# Patient Record
Sex: FEMALE | Race: WHITE | NOT HISPANIC OR LATINO | Employment: FULL TIME | URBAN - NONMETROPOLITAN AREA
[De-identification: names, ages, dates, MRNs, and addresses within clinical notes are randomized per-mention and may not be internally consistent; named-entity substitution may affect disease eponyms.]

---

## 2018-06-20 ENCOUNTER — TELEPHONE (OUTPATIENT)
Dept: SURGERY | Facility: CLINIC | Age: 48
End: 2018-06-20

## 2018-06-25 ENCOUNTER — PREP FOR SURGERY (OUTPATIENT)
Dept: OTHER | Facility: HOSPITAL | Age: 48
End: 2018-06-25

## 2018-06-25 DIAGNOSIS — Z12.11 COLON CANCER SCREENING: Primary | ICD-10-CM

## 2018-06-27 ENCOUNTER — OFFICE VISIT (OUTPATIENT)
Dept: OBSTETRICS AND GYNECOLOGY | Facility: CLINIC | Age: 48
End: 2018-06-27

## 2018-06-27 VITALS
SYSTOLIC BLOOD PRESSURE: 116 MMHG | BODY MASS INDEX: 26.87 KG/M2 | DIASTOLIC BLOOD PRESSURE: 70 MMHG | WEIGHT: 146 LBS | HEIGHT: 62 IN

## 2018-06-27 DIAGNOSIS — N92.6 IRREGULAR MENSES: Primary | ICD-10-CM

## 2018-06-27 PROBLEM — Z12.11 COLON CANCER SCREENING: Status: ACTIVE | Noted: 2018-06-27

## 2018-06-27 PROCEDURE — 99204 OFFICE O/P NEW MOD 45 MIN: CPT | Performed by: OBSTETRICS & GYNECOLOGY

## 2018-06-27 RX ORDER — HYDROXYZINE PAMOATE 25 MG/1
CAPSULE ORAL
Refills: 0 | COMMUNITY
Start: 2018-06-06 | End: 2023-02-13

## 2018-06-27 RX ORDER — CITALOPRAM 10 MG/1
TABLET ORAL
COMMUNITY
End: 2022-08-12

## 2018-06-27 NOTE — PROGRESS NOTES
Subjective   Chief Complaint   Patient presents with   • Menstrual Problem     Светлана Bates is a 48 y.o. year old  ( x 2, C/S x 1).  Patient's last menstrual period was 2018.  She presents to be seen because of Irregular menstruation.  Patient's having 2 periods a month or less.   Going On for maybe 3 or 4 months..  Mother with a change at 48 years of age.  Patient doesn't have any significant vasomotor symptoms.  Last Pap smear was 6 years ago mammogram normal this past month.    OTHER COMPLAINTS:  Nothing else    The following portions of the patient's history were reviewed and updated as appropriate:  She  has a past medical history of Anemia and Anxiety.  She  does not have any pertinent problems on file.  She  has a past surgical history that includes  section (1998).  Her family history is not on file.  She  reports that she has never smoked. She has never used smokeless tobacco. She reports that she does not drink alcohol or use drugs.  Current Outpatient Prescriptions   Medication Sig Dispense Refill   • citalopram (CeleXA) 10 MG tablet citalopram 10 mg tablet   1 and 1/2 tablets daily     • hydrOXYzine (VISTARIL) 25 MG capsule take 1 capsule by mouth four times a day if needed  0     No current facility-administered medications for this visit.      No current outpatient prescriptions on file prior to visit.     No current facility-administered medications on file prior to visit.      She is allergic to albuterol and oxycodone-acetaminophen.    Smoking status: Never Smoker                                                              Smokeless tobacco: Never Used                        Review of Systems  Consitutional POS: nothing reported    NEG: anorexia or night sweats   Gastointestinal POS: nothing reported    NEG: bloating, change in bowel habits, melena or reflux symptoms   Genitourinary POS: nothing reported    NEG: dysuria or hematuria   Integument POS: nothing reported  "   NEG: moles that are changing in size, shape, color or rashes   Breast POS: nothing reported    NEG: persistent breast lump, skin dimpling or nipple discharge         Eyes: negative  Ears, nose, mouth, throat, and face: negative  Respiratory: negative  Cardiovascular: negative  GYN:  as above  Hematologic/lymphatic: negative  Musculoskeletal:negative  Neurological: negative  Behavioral/Psych: negative  Endocrine: negative  Allergic/Immunologic: negative          Objective   /70   Ht 157.5 cm (62\")   Wt 66.2 kg (146 lb)   LMP 06/18/2018   BMI 26.70 kg/m²     General:  well developed; well nourished  no acute distress  obese - Body mass index is 26.7 kg/m².   Skin:  No suspicious lesions seen   Thyroid: normal to inspection and palpation   Lungs:  breathing is unlabored  clear to auscultation bilaterally   Heart:  regular rate and rhythm, S1, S2 normal, no murmur, click, rub or gallop   Breasts:  Not performed.   Abdomen: soft, non-tender; no masses  no umbilical or inginual hernias are present  no hepato-splenomegaly   Pelvis: Clinical staff was present for exam  External genitalia:  normal appearance of the external genitalia including Bartholin's and [place]'s glands.  :  urethral meatus normal;  Vaginal:  normal pink mucosa without prolapse or lesions.  Cervix:  normal appearance.  Uterus:  normal size, shape and consistency.  Adnexa:  normal bimanual exam of the adnexa.  Rectal:  digital rectal exam not performed; anus visually normal appearing.     Psychiatric: Alert and oriented ×3, mood and affect appropriate  HEENT: Atraumatic, normocephalic, normal scleral icterus  Extremities: 2+ pulses bilaterally, no edema      Lab Review   No data reviewed    Imaging   No data reviewed        Assessment   1. Irregular menstruation for the last several months.     Plan   1. Smear done.  FSH and estradiol today.  TVS in 2-3 weeks.  2.     No orders of the defined types were placed in this encounter.     "     This note was electronically signed.      June 27, 2018

## 2018-06-28 LAB
ESTRADIOL SERPL-MCNC: 125.9 PG/ML
FSH SERPL-ACNC: 18.6 MIU/ML

## 2018-07-10 DIAGNOSIS — N92.6 IRREGULAR MENSES: ICD-10-CM

## 2018-07-18 ENCOUNTER — HOSPITAL ENCOUNTER (OUTPATIENT)
Dept: GENERAL RADIOLOGY | Facility: HOSPITAL | Age: 48
Discharge: HOME | End: 2018-07-18
Admitting: OBSTETRICS & GYNECOLOGY

## 2018-07-18 ENCOUNTER — APPOINTMENT (OUTPATIENT)
Dept: PREADMISSION TESTING | Facility: HOSPITAL | Age: 48
End: 2018-07-18

## 2018-07-18 ENCOUNTER — OFFICE VISIT (OUTPATIENT)
Dept: OBSTETRICS AND GYNECOLOGY | Facility: CLINIC | Age: 48
End: 2018-07-18

## 2018-07-18 VITALS
BODY MASS INDEX: 26.87 KG/M2 | SYSTOLIC BLOOD PRESSURE: 108 MMHG | HEIGHT: 62 IN | DIASTOLIC BLOOD PRESSURE: 66 MMHG | WEIGHT: 146 LBS

## 2018-07-18 VITALS
DIASTOLIC BLOOD PRESSURE: 77 MMHG | SYSTOLIC BLOOD PRESSURE: 124 MMHG | HEART RATE: 72 BPM | WEIGHT: 146 LBS | BODY MASS INDEX: 26.87 KG/M2 | OXYGEN SATURATION: 99 % | HEIGHT: 62 IN

## 2018-07-18 DIAGNOSIS — N84.0 ENDOMETRIAL POLYP: ICD-10-CM

## 2018-07-18 DIAGNOSIS — N92.6 IRREGULAR MENSTRUATION: ICD-10-CM

## 2018-07-18 DIAGNOSIS — N84.0 ENDOMETRIAL POLYP: Primary | ICD-10-CM

## 2018-07-18 LAB
ANION GAP SERPL CALCULATED.3IONS-SCNC: 14.9 MMOL/L (ref 10–20)
BACTERIA UR QL AUTO: ABNORMAL /HPF
BASOPHILS # BLD AUTO: 0.02 10*3/MM3 (ref 0–0.2)
BASOPHILS NFR BLD AUTO: 0.3 % (ref 0–2.5)
BILIRUB UR QL STRIP: NEGATIVE
BUN BLD-MCNC: 9 MG/DL (ref 7–20)
BUN/CREAT SERPL: 15 (ref 7.1–23.5)
CALCIUM SPEC-SCNC: 9.6 MG/DL (ref 8.4–10.2)
CHLORIDE SERPL-SCNC: 101 MMOL/L (ref 98–107)
CLARITY UR: CLEAR
CO2 SERPL-SCNC: 28 MMOL/L (ref 26–30)
COLOR UR: YELLOW
CREAT BLD-MCNC: 0.6 MG/DL (ref 0.6–1.3)
DEPRECATED RDW RBC AUTO: 47.6 FL (ref 37–54)
EOSINOPHIL # BLD AUTO: 0.02 10*3/MM3 (ref 0–0.7)
EOSINOPHIL NFR BLD AUTO: 0.3 % (ref 0–7)
ERYTHROCYTE [DISTWIDTH] IN BLOOD BY AUTOMATED COUNT: 15.1 % (ref 11.5–14.5)
GFR SERPL CREATININE-BSD FRML MDRD: 106 ML/MIN/1.73
GLUCOSE BLD-MCNC: 94 MG/DL (ref 74–98)
GLUCOSE UR STRIP-MCNC: NEGATIVE MG/DL
HCT VFR BLD AUTO: 32 % (ref 37–47)
HGB BLD-MCNC: 9.9 G/DL (ref 12–16)
HGB UR QL STRIP.AUTO: ABNORMAL
HYALINE CASTS UR QL AUTO: ABNORMAL /LPF
IMM GRANULOCYTES # BLD: 0.02 10*3/MM3 (ref 0–0.06)
IMM GRANULOCYTES NFR BLD: 0.3 % (ref 0–0.6)
KETONES UR QL STRIP: NEGATIVE
LEUKOCYTE ESTERASE UR QL STRIP.AUTO: NEGATIVE
LYMPHOCYTES # BLD AUTO: 2.07 10*3/MM3 (ref 0.6–3.4)
LYMPHOCYTES NFR BLD AUTO: 36.1 % (ref 10–50)
MCH RBC QN AUTO: 27 PG (ref 27–31)
MCHC RBC AUTO-ENTMCNC: 30.9 G/DL (ref 30–37)
MCV RBC AUTO: 87.4 FL (ref 81–99)
MONOCYTES # BLD AUTO: 0.37 10*3/MM3 (ref 0–0.9)
MONOCYTES NFR BLD AUTO: 6.4 % (ref 0–12)
NEUTROPHILS # BLD AUTO: 3.24 10*3/MM3 (ref 2–6.9)
NEUTROPHILS NFR BLD AUTO: 56.6 % (ref 37–80)
NITRITE UR QL STRIP: NEGATIVE
NRBC BLD MANUAL-RTO: 0 /100 WBC (ref 0–0)
PH UR STRIP.AUTO: 7.5 [PH] (ref 5–8)
PLATELET # BLD AUTO: 340 10*3/MM3 (ref 130–400)
PMV BLD AUTO: 10.8 FL (ref 6–12)
POTASSIUM BLD-SCNC: 3.9 MMOL/L (ref 3.5–5.1)
PROT UR QL STRIP: NEGATIVE
RBC # BLD AUTO: 3.66 10*6/MM3 (ref 4.2–5.4)
RBC # UR: ABNORMAL /HPF
REF LAB TEST METHOD: ABNORMAL
SODIUM BLD-SCNC: 140 MMOL/L (ref 137–145)
SP GR UR STRIP: 1.01 (ref 1–1.03)
SQUAMOUS #/AREA URNS HPF: ABNORMAL /HPF
UROBILINOGEN UR QL STRIP: ABNORMAL
WBC NRBC COR # BLD: 5.74 10*3/MM3 (ref 4.8–10.8)
WBC UR QL AUTO: ABNORMAL /HPF

## 2018-07-18 PROCEDURE — 85025 COMPLETE CBC W/AUTO DIFF WBC: CPT | Performed by: OBSTETRICS & GYNECOLOGY

## 2018-07-18 PROCEDURE — 36415 COLL VENOUS BLD VENIPUNCTURE: CPT

## 2018-07-18 PROCEDURE — 81001 URINALYSIS AUTO W/SCOPE: CPT | Performed by: OBSTETRICS & GYNECOLOGY

## 2018-07-18 PROCEDURE — 99213 OFFICE O/P EST LOW 20 MIN: CPT | Performed by: OBSTETRICS & GYNECOLOGY

## 2018-07-18 PROCEDURE — 93005 ELECTROCARDIOGRAM TRACING: CPT | Performed by: OBSTETRICS & GYNECOLOGY

## 2018-07-18 PROCEDURE — 71045 X-RAY EXAM CHEST 1 VIEW: CPT

## 2018-07-18 PROCEDURE — 80048 BASIC METABOLIC PNL TOTAL CA: CPT | Performed by: OBSTETRICS & GYNECOLOGY

## 2018-07-18 NOTE — PROGRESS NOTES
Patient presents with   • Menstrual Problem      Светлана Bates is a 48 y.o. year old  ( x 2, C/S x 1).  Patient's last menstrual period was 2018.  She presents to be seen because of Irregular menstruation.  Patient's having 2 periods a month or less.   Going On for maybe 3 or 4 months..  Mother with a change at 48 years of age.  Patient doesn't have any significant vasomotor symptoms.  Last Pap smear was 6 years ago mammogram normal this past month.     OTHER COMPLAINTS:  Nothing else     The following portions of the patient's history were reviewed and updated as appropriate:  She  has a past medical history of Anemia and Anxiety.  She  does not have any pertinent problems on file.  She  has a past surgical history that includes  section (1998).  Her family history is not on file.  She  reports that she has never smoked. She has never used smokeless tobacco. She reports that she does not drink alcohol or use drugs.         Current Outpatient Prescriptions   Medication Sig Dispense Refill   • citalopram (CeleXA) 10 MG tablet citalopram 10 mg tablet   1 and 1/2 tablets daily       • hydrOXYzine (VISTARIL) 25 MG capsule take 1 capsule by mouth four times a day if needed   0      No current facility-administered medications for this visit.       No current outpatient prescriptions on file prior to visit.      No current facility-administered medications on file prior to visit.       She is allergic to albuterol and oxycodone-acetaminophen.     Smoking status: Never Smoker                                                               Smokeless tobacco: Never Used                         Review of Systems        Consitutional POS: nothing reported     NEG: anorexia or night sweats   Gastointestinal POS: nothing reported     NEG: bloating, change in bowel habits, melena or reflux symptoms   Genitourinary POS: nothing reported     NEG: dysuria or hematuria   Integument POS: nothing reported  "    NEG: moles that are changing in size, shape, color or rashes   Breast POS: nothing reported     NEG: persistent breast lump, skin dimpling or nipple discharge             Eyes: negative  Ears, nose, mouth, throat, and face: negative  Respiratory: negative  Cardiovascular: negative  GYN:  as above  Hematologic/lymphatic: negative  Musculoskeletal:negative  Neurological: negative  Behavioral/Psych: negative  Endocrine: negative  Allergic/Immunologic: negative              Objective      /70   Ht 157.5 cm (62\")   Wt 66.2 kg (146 lb)   LMP 06/18/2018   BMI 26.70 kg/m²      General:  well developed; well nourished  no acute distress  obese - Body mass index is 26.7 kg/m².   Skin:  No suspicious lesions seen   Thyroid: normal to inspection and palpation   Lungs:  breathing is unlabored  clear to auscultation bilaterally   Heart:  regular rate and rhythm, S1, S2 normal, no murmur, click, rub or gallop   Breasts:  Not performed.   Abdomen: soft, non-tender; no masses  no umbilical or inginual hernias are present  no hepato-splenomegaly   Pelvis: Clinical staff was present for exam  External genitalia:  normal appearance of the external genitalia including Bartholin's and [place]'s glands.  :  urethral meatus normal;  Vaginal:  normal pink mucosa without prolapse or lesions.  Cervix:  normal appearance.  Uterus:  normal size, shape and consistency.  Adnexa:  normal bimanual exam of the adnexa.  Rectal:  digital rectal exam not performed; anus visually normal appearing.      Psychiatric: Alert and oriented ×3, mood and affect appropriate  HEENT: Atraumatic, normocephalic, normal scleral icterus  Extremities: 2+ pulses bilaterally, no edema        Lab Review   FSH 18, pap WNL     Imaging   Images reviwed: thicekend endometrium , uterus  10.45 x 7x 6, normal adnexa, simple left ovarian cyst.            Assessment      1. Irregular menstruation for the last several months. Suspected Endometrial polyp        Plan    "   1. Hysteroscopy, D&C  2. R B/A

## 2018-07-18 NOTE — PAT
"DURING PAT HEALTH HISTORY PATIENT REPORTED THAT SHE HAD CHEST PAIN IN FEB 2018 AND THAT WHEN SEEN BY MD, SHE WAS DIAGNOSED WITH RIGHT SIDE PNEUMONIA AND PLEURICY.  REPORTS SHE WAS TREATED .    REPORTS ON July 4 2018 THAT SHE HAD CHEST PAIN AGAIN \"THAT LASTED ABOUT HALF A DAY\" AND EXPERIENCED DISCOMFORT ON THE LEFT SIDE.  PATIENT REPORTED THAT THE PAIN RESOLVED BUT THAT SHE DID NOT SEEK MEDICAL TREATMENT BECAUSE IT \"WAS LIKE WHAT SHE HAD IN FEB AND THAT SHE DID NOT WANT TO INCUR MEDICAL BILLS\".      EKG OBTAINED.  PRELIMINARY READING PHONED TO GURWINDER CHAPIN CRNA. DISCUSSED PATIENT'S HISTORY OF CHEST PAIN, WITH RECENT REPORT 07-04-18.  DISCUSSED WITH CRNA PATIENT'S MEDICATION LIST, HX OF ANXIETY AND THAT PATIENT WAS NOT UNDER THE CARE OF CARDIOLOGY FOR ANY REASON.    [facility] REQUESTED THAT PATIENT HAVE A CXR IN ADDITION TO THE TESTING ALREADY ORDERED.  [facility] REPORTED THAT AS LONG AS THE CXR WAS NORMAL, THAT PATIENT MAY PROCEED WITH PROCEDURES AS SCHEDULED FOR BOTH 07-27-18 (COLONOSCOPY WITH DR PALMER) AND 08-10-18 (D&C HYSTEROSCOPY WITH DR PAREDES).    PATIENT DISMISSED FROM [facility] AND FREE OF DISTRESS. PATIENT INSTRUCTED TO SEEK MEDICAL ATTENTION IMMEDIATELY IN THE FUTURE IF SHE EXPERIENCED CHEST PAIN.  PATIENT VERBALIZED UNDERSTANDING.    "

## 2018-07-19 RX ORDER — NITROFURANTOIN 25; 75 MG/1; MG/1
100 CAPSULE ORAL 2 TIMES DAILY
Qty: 14 CAPSULE | Refills: 0 | Status: SHIPPED | OUTPATIENT
Start: 2018-07-19 | End: 2018-07-27 | Stop reason: HOSPADM

## 2018-07-26 ENCOUNTER — TELEPHONE (OUTPATIENT)
Dept: SURGERY | Facility: CLINIC | Age: 48
End: 2018-07-26

## 2018-07-27 ENCOUNTER — ANESTHESIA EVENT (OUTPATIENT)
Dept: GASTROENTEROLOGY | Facility: HOSPITAL | Age: 48
End: 2018-07-27

## 2018-07-27 ENCOUNTER — HOSPITAL ENCOUNTER (OUTPATIENT)
Facility: HOSPITAL | Age: 48
Setting detail: HOSPITAL OUTPATIENT SURGERY
Discharge: HOME | End: 2018-07-27
Attending: SURGERY | Admitting: SURGERY

## 2018-07-27 ENCOUNTER — ANESTHESIA (OUTPATIENT)
Dept: GASTROENTEROLOGY | Facility: HOSPITAL | Age: 48
End: 2018-07-27

## 2018-07-27 VITALS
RESPIRATION RATE: 18 BRPM | TEMPERATURE: 98.1 F | HEIGHT: 62 IN | OXYGEN SATURATION: 100 % | SYSTOLIC BLOOD PRESSURE: 101 MMHG | WEIGHT: 146 LBS | HEART RATE: 69 BPM | BODY MASS INDEX: 26.87 KG/M2 | DIASTOLIC BLOOD PRESSURE: 61 MMHG

## 2018-07-27 LAB — HCG SERPL QL: NEGATIVE

## 2018-07-27 PROCEDURE — S0260 H&P FOR SURGERY: HCPCS | Performed by: SURGERY

## 2018-07-27 PROCEDURE — 84703 CHORIONIC GONADOTROPIN ASSAY: CPT | Performed by: NURSE ANESTHETIST, CERTIFIED REGISTERED

## 2018-07-27 PROCEDURE — 25010000002 PROPOFOL 200 MG/20ML EMULSION: Performed by: NURSE ANESTHETIST, CERTIFIED REGISTERED

## 2018-07-27 RX ORDER — MAGNESIUM HYDROXIDE 1200 MG/15ML
LIQUID ORAL AS NEEDED
Status: DISCONTINUED | OUTPATIENT
Start: 2018-07-27 | End: 2018-07-27 | Stop reason: HOSPADM

## 2018-07-27 RX ORDER — ONDANSETRON 2 MG/ML
4 INJECTION INTRAMUSCULAR; INTRAVENOUS ONCE AS NEEDED
Status: DISCONTINUED | OUTPATIENT
Start: 2018-07-27 | End: 2018-07-27 | Stop reason: HOSPADM

## 2018-07-27 RX ORDER — SODIUM CHLORIDE 0.9 % (FLUSH) 0.9 %
3 SYRINGE (ML) INJECTION AS NEEDED
Status: DISCONTINUED | OUTPATIENT
Start: 2018-07-27 | End: 2018-07-27 | Stop reason: HOSPADM

## 2018-07-27 RX ORDER — PROPOFOL 10 MG/ML
INJECTION, EMULSION INTRAVENOUS AS NEEDED
Status: DISCONTINUED | OUTPATIENT
Start: 2018-07-27 | End: 2018-07-27 | Stop reason: SURG

## 2018-07-27 RX ORDER — SODIUM CHLORIDE, SODIUM LACTATE, POTASSIUM CHLORIDE, CALCIUM CHLORIDE 600; 310; 30; 20 MG/100ML; MG/100ML; MG/100ML; MG/100ML
1000 INJECTION, SOLUTION INTRAVENOUS CONTINUOUS
Status: DISCONTINUED | OUTPATIENT
Start: 2018-07-27 | End: 2018-07-27 | Stop reason: HOSPADM

## 2018-07-27 RX ADMIN — SODIUM CHLORIDE, POTASSIUM CHLORIDE, SODIUM LACTATE AND CALCIUM CHLORIDE 1000 ML: 600; 310; 30; 20 INJECTION, SOLUTION INTRAVENOUS at 08:34

## 2018-07-27 RX ADMIN — PROPOFOL 100 MG: 10 INJECTION, EMULSION INTRAVENOUS at 10:42

## 2018-07-27 RX ADMIN — PROPOFOL 50 MG: 10 INJECTION, EMULSION INTRAVENOUS at 10:49

## 2018-07-27 NOTE — ANESTHESIA POSTPROCEDURE EVALUATION
Patient: Светлана Bates    Procedure Summary     Date:  07/27/18 Room / Location:  [facility] ENDOSCOPY 3 / [facility] ENDOSCOPY    Anesthesia Start:  1038 Anesthesia Stop:  1054    Procedure:  COLONOSCOPY (N/A ) Diagnosis:       Colon cancer screening      (Colon cancer screening [Z12.11])    Surgeon:  Basil Rodarte MD Provider:  Mukesh Morgan CRNA    Anesthesia Type:  MAC ASA Status:  2          Anesthesia Type: MAC  Last vitals  BP   101/61 (07/27/18 1114)   Temp   98.1 °F (36.7 °C) (07/27/18 1058)   Pulse   69 (07/27/18 1114)   Resp   18 (07/27/18 1114)     SpO2   100 % (07/27/18 1114)     Post Anesthesia Care and Evaluation    Patient location during evaluation: PHASE II  Patient participation: complete - patient participated  Level of consciousness: awake and alert  Pain score: 0  Pain management: satisfactory to patient  Airway patency: patent  Anesthetic complications: No anesthetic complications  PONV Status: none  Cardiovascular status: acceptable and hemodynamically stable  Respiratory status: acceptable  Hydration status: acceptable

## 2018-07-27 NOTE — ANESTHESIA PREPROCEDURE EVALUATION
Anesthesia Evaluation     Patient summary reviewed and Nursing notes reviewed   no history of anesthetic complications:  NPO Solid Status: > 8 hours  NPO Liquid Status: > 8 hours           Airway   Mallampati: I  TM distance: >3 FB  Neck ROM: full  no difficulty expected  Dental - normal exam   (+) edentulous    Pulmonary - negative pulmonary ROS and normal exam   Cardiovascular - normal exam    (+) valvular problems/murmurs murmur,       Neuro/Psych- negative ROS  GI/Hepatic/Renal/Endo - negative ROS     Musculoskeletal (-) negative ROS    Abdominal    Substance History - negative use     OB/GYN negative ob/gyn ROS         Other - negative ROS                     Anesthesia Plan    ASA 2     MAC     intravenous induction   Anesthetic plan and risks discussed with patient.

## 2018-08-10 ENCOUNTER — ANESTHESIA EVENT (OUTPATIENT)
Dept: PERIOP | Facility: HOSPITAL | Age: 48
End: 2018-08-10

## 2018-08-10 ENCOUNTER — HOSPITAL ENCOUNTER (OUTPATIENT)
Facility: HOSPITAL | Age: 48
Setting detail: HOSPITAL OUTPATIENT SURGERY
Discharge: HOME | End: 2018-08-10
Attending: OBSTETRICS & GYNECOLOGY | Admitting: OBSTETRICS & GYNECOLOGY

## 2018-08-10 ENCOUNTER — ANESTHESIA (OUTPATIENT)
Dept: PERIOP | Facility: HOSPITAL | Age: 48
End: 2018-08-10

## 2018-08-10 VITALS
RESPIRATION RATE: 16 BRPM | DIASTOLIC BLOOD PRESSURE: 56 MMHG | OXYGEN SATURATION: 100 % | HEART RATE: 82 BPM | TEMPERATURE: 98.3 F | SYSTOLIC BLOOD PRESSURE: 107 MMHG

## 2018-08-10 DIAGNOSIS — N92.6 IRREGULAR MENSTRUATION: ICD-10-CM

## 2018-08-10 DIAGNOSIS — N84.0 ENDOMETRIAL POLYP: ICD-10-CM

## 2018-08-10 LAB
B-HCG UR QL: NEGATIVE
INTERNAL NEGATIVE CONTROL: NEGATIVE
INTERNAL POSITIVE CONTROL: POSITIVE
Lab: NORMAL

## 2018-08-10 PROCEDURE — 25010000002 KETOROLAC TROMETHAMINE PER 15 MG: Performed by: NURSE ANESTHETIST, CERTIFIED REGISTERED

## 2018-08-10 PROCEDURE — 81025 URINE PREGNANCY TEST: CPT | Performed by: OBSTETRICS & GYNECOLOGY

## 2018-08-10 PROCEDURE — 25010000002 PROPOFOL 10 MG/ML EMULSION: Performed by: NURSE ANESTHETIST, CERTIFIED REGISTERED

## 2018-08-10 PROCEDURE — 25010000002 ONDANSETRON PER 1 MG: Performed by: NURSE ANESTHETIST, CERTIFIED REGISTERED

## 2018-08-10 PROCEDURE — 25010000002 DEXAMETHASONE PER 1 MG: Performed by: NURSE ANESTHETIST, CERTIFIED REGISTERED

## 2018-08-10 PROCEDURE — 58558 HYSTEROSCOPY BIOPSY: CPT | Performed by: OBSTETRICS & GYNECOLOGY

## 2018-08-10 RX ORDER — MEPERIDINE HYDROCHLORIDE 50 MG/ML
INJECTION INTRAMUSCULAR; INTRAVENOUS; SUBCUTANEOUS AS NEEDED
Status: DISCONTINUED | OUTPATIENT
Start: 2018-08-10 | End: 2018-08-10 | Stop reason: SURG

## 2018-08-10 RX ORDER — KETOROLAC TROMETHAMINE 30 MG/ML
INJECTION, SOLUTION INTRAMUSCULAR; INTRAVENOUS AS NEEDED
Status: DISCONTINUED | OUTPATIENT
Start: 2018-08-10 | End: 2018-08-10 | Stop reason: SURG

## 2018-08-10 RX ORDER — SODIUM CHLORIDE, SODIUM LACTATE, POTASSIUM CHLORIDE, CALCIUM CHLORIDE 600; 310; 30; 20 MG/100ML; MG/100ML; MG/100ML; MG/100ML
1000 INJECTION, SOLUTION INTRAVENOUS CONTINUOUS
Status: DISCONTINUED | OUTPATIENT
Start: 2018-08-10 | End: 2018-08-10 | Stop reason: HOSPADM

## 2018-08-10 RX ORDER — PROPOFOL 10 MG/ML
VIAL (ML) INTRAVENOUS AS NEEDED
Status: DISCONTINUED | OUTPATIENT
Start: 2018-08-10 | End: 2018-08-10 | Stop reason: SURG

## 2018-08-10 RX ORDER — IBUPROFEN 600 MG/1
600 TABLET ORAL EVERY 6 HOURS PRN
Status: CANCELLED | OUTPATIENT
Start: 2018-08-10

## 2018-08-10 RX ORDER — SODIUM CHLORIDE 0.9 % (FLUSH) 0.9 %
3 SYRINGE (ML) INJECTION AS NEEDED
Status: DISCONTINUED | OUTPATIENT
Start: 2018-08-10 | End: 2018-08-10 | Stop reason: HOSPADM

## 2018-08-10 RX ORDER — DEXAMETHASONE SODIUM PHOSPHATE 4 MG/ML
INJECTION, SOLUTION INTRA-ARTICULAR; INTRALESIONAL; INTRAMUSCULAR; INTRAVENOUS; SOFT TISSUE AS NEEDED
Status: DISCONTINUED | OUTPATIENT
Start: 2018-08-10 | End: 2018-08-10 | Stop reason: SURG

## 2018-08-10 RX ORDER — IBUPROFEN 600 MG/1
600 TABLET ORAL EVERY 6 HOURS PRN
Qty: 30 TABLET | Refills: 1 | Status: SHIPPED | OUTPATIENT
Start: 2018-08-10 | End: 2021-05-05

## 2018-08-10 RX ORDER — ONDANSETRON 2 MG/ML
INJECTION INTRAMUSCULAR; INTRAVENOUS AS NEEDED
Status: DISCONTINUED | OUTPATIENT
Start: 2018-08-10 | End: 2018-08-10 | Stop reason: SURG

## 2018-08-10 RX ORDER — LIDOCAINE HYDROCHLORIDE 20 MG/ML
INJECTION, SOLUTION INFILTRATION; PERINEURAL AS NEEDED
Status: DISCONTINUED | OUTPATIENT
Start: 2018-08-10 | End: 2018-08-10 | Stop reason: HOSPADM

## 2018-08-10 RX ORDER — ONDANSETRON 4 MG/1
4 TABLET, FILM COATED ORAL ONCE AS NEEDED
Status: DISCONTINUED | OUTPATIENT
Start: 2018-08-10 | End: 2018-08-10 | Stop reason: HOSPADM

## 2018-08-10 RX ADMIN — SODIUM CHLORIDE, POTASSIUM CHLORIDE, SODIUM LACTATE AND CALCIUM CHLORIDE: 600; 310; 30; 20 INJECTION, SOLUTION INTRAVENOUS at 09:56

## 2018-08-10 RX ADMIN — PROPOFOL 50 MG: 10 INJECTION, EMULSION INTRAVENOUS at 09:25

## 2018-08-10 RX ADMIN — MEPERIDINE HYDROCHLORIDE 25 MG: 50 INJECTION, SOLUTION INTRAMUSCULAR; INTRAVENOUS; SUBCUTANEOUS at 09:24

## 2018-08-10 RX ADMIN — PROPOFOL 50 MG: 10 INJECTION, EMULSION INTRAVENOUS at 09:51

## 2018-08-10 RX ADMIN — PROPOFOL 50 MG: 10 INJECTION, EMULSION INTRAVENOUS at 09:46

## 2018-08-10 RX ADMIN — SODIUM CHLORIDE, POTASSIUM CHLORIDE, SODIUM LACTATE AND CALCIUM CHLORIDE 1000 ML: 600; 310; 30; 20 INJECTION, SOLUTION INTRAVENOUS at 08:22

## 2018-08-10 RX ADMIN — PROPOFOL 50 MG: 10 INJECTION, EMULSION INTRAVENOUS at 09:30

## 2018-08-10 RX ADMIN — PROPOFOL 50 MG: 10 INJECTION, EMULSION INTRAVENOUS at 09:41

## 2018-08-10 RX ADMIN — MEPERIDINE HYDROCHLORIDE 25 MG: 50 INJECTION, SOLUTION INTRAMUSCULAR; INTRAVENOUS; SUBCUTANEOUS at 09:28

## 2018-08-10 RX ADMIN — ONDANSETRON 4 MG: 2 INJECTION INTRAMUSCULAR; INTRAVENOUS at 09:28

## 2018-08-10 RX ADMIN — DEXAMETHASONE SODIUM PHOSPHATE 4 MG: 4 INJECTION, SOLUTION INTRAMUSCULAR; INTRAVENOUS at 09:28

## 2018-08-10 RX ADMIN — LIDOCAINE HYDROCHLORIDE 60 MG: 20 INJECTION, SOLUTION INTRAVENOUS at 09:25

## 2018-08-10 RX ADMIN — KETOROLAC TROMETHAMINE 30 MG: 30 INJECTION, SOLUTION INTRAMUSCULAR at 09:28

## 2018-08-10 RX ADMIN — PROPOFOL 50 MG: 10 INJECTION, EMULSION INTRAVENOUS at 09:36

## 2018-08-10 NOTE — H&P (VIEW-ONLY)
Patient presents with   • Menstrual Problem      Светлана Bates is a 48 y.o. year old  ( x 2, C/S x 1).  Patient's last menstrual period was 2018.  She presents to be seen because of Irregular menstruation.  Patient's having 2 periods a month or less.   Going On for maybe 3 or 4 months..  Mother with a change at 50 years of age.  Patient doesn't have any significant vasomotor symptoms.  Last Pap smear was 6 years ago mammogram normal this past month.     OTHER COMPLAINTS:  Nothing else     The following portions of the patient's history were reviewed and updated as appropriate:  She  has a past medical history of Anemia and Anxiety.  She  does not have any pertinent problems on file.  She  has a past surgical history that includes  section (1998).  Her family history is not on file.  She  reports that she has never smoked. She has never used smokeless tobacco. She reports that she does not drink alcohol or use drugs.         Current Outpatient Prescriptions   Medication Sig Dispense Refill   • citalopram (CeleXA) 10 MG tablet citalopram 10 mg tablet   1 and 1/2 tablets daily       • hydrOXYzine (VISTARIL) 25 MG capsule take 1 capsule by mouth four times a day if needed   0      No current facility-administered medications for this visit.       No current outpatient prescriptions on file prior to visit.      No current facility-administered medications on file prior to visit.       She is allergic to albuterol and oxycodone-acetaminophen.     Smoking status: Never Smoker                                                               Smokeless tobacco: Never Used                         Review of Systems        Consitutional POS: nothing reported     NEG: anorexia or night sweats   Gastointestinal POS: nothing reported     NEG: bloating, change in bowel habits, melena or reflux symptoms   Genitourinary POS: nothing reported     NEG: dysuria or hematuria   Integument POS: nothing reported    "    NEG: moles that are changing in size, shape, color or rashes   Breast POS: nothing reported     NEG: persistent breast lump, skin dimpling or nipple discharge             Eyes: negative  Ears, nose, mouth, throat, and face: negative  Respiratory: negative  Cardiovascular: negative  GYN:  as above  Hematologic/lymphatic: negative  Musculoskeletal:negative  Neurological: negative  Behavioral/Psych: negative  Endocrine: negative  Allergic/Immunologic: negative              Objective      /70   Ht 157.5 cm (62\")   Wt 66.2 kg (146 lb)   LMP 06/18/2018   BMI 26.70 kg/m²      General:  well developed; well nourished  no acute distress  obese - Body mass index is 26.7 kg/m².   Skin:  No suspicious lesions seen   Thyroid: normal to inspection and palpation   Lungs:  breathing is unlabored  clear to auscultation bilaterally   Heart:  regular rate and rhythm, S1, S2 normal, no murmur, click, rub or gallop   Breasts:  Not performed.   Abdomen: soft, non-tender; no masses  no umbilical or inginual hernias are present  no hepato-splenomegaly   Pelvis: Clinical staff was present for exam  External genitalia:  normal appearance of the external genitalia including Bartholin's and [place]'s glands.  :  urethral meatus normal;  Vaginal:  normal pink mucosa without prolapse or lesions.  Cervix:  normal appearance.  Uterus:  normal size, shape and consistency.  Adnexa:  normal bimanual exam of the adnexa.  Rectal:  digital rectal exam not performed; anus visually normal appearing.      Psychiatric: Alert and oriented ×3, mood and affect appropriate  HEENT: Atraumatic, normocephalic, normal scleral icterus  Extremities: 2+ pulses bilaterally, no edema        Lab Review   FSH 18, pap WNL     Imaging   Images reviwed: thicekend endometrium , uterus  10.45 x 7x 6, normal adnexa, simple left ovarian cyst.            Assessment      1. Irregular menstruation for the last several months. Suspected Endometrial polyp        Plan    "   1. Hysteroscopy, D&C  2. R B/A

## 2018-08-10 NOTE — DISCHARGE INSTRUCTIONS
To assist you in voiding:  Drink plenty of fluids  Listen to running water while attempting to void.    If you are unable to urinate and you have an uncomfortable urge to void or it has been   6 hours since you were discharged, return to the Emergency Room    No pushing, pulling, tugging, heavy lifting, or strenuous activity.  No major decision making, driving, or drinking alcoholic beverages for 24 hours. (due to the medications you have received)  Always use good hand hygiene/washing techniques.  NO driving while taking pain medications.

## 2018-08-10 NOTE — OP NOTE
Rishi Bates  : 1970  MRN: [id number]  CSN: [id number]  Date: 8/10/2018    Operative Report    DILATATION AND CURETTAGE HYSTEROSCOPY      Pre-op Diagnosis:  Endometrial polyp [N84.0]  Irregular menstruation [N92.6]   Post-op Diagnosis:  Post-Op Diagnosis Codes:     * Endometrial polyp [N84.0]     * Irregular menstruation [N92.6]   Procedure: Procedure(s):  DILATATION AND CURETTAGE HYSTEROSCOPY   Surgeon: CECIL Harden M.D.   Assist: Mode Loredo MD   Anesthesia: Monitor Anesthesia Care   Estimated Blood Loss: <15 mls   ABx: none   Specimens:  Endometrial curettings and polyps   Findings: Endometrial polyps   Complications: none   Indications:  48-year-old with intermenstrual spotting and a thickened endometrial lining suggestive of polyps presents for D&C hysteroscopy.  Risk benefits alternatives were discussed all questions were answered.       Description of Procedure:  After the appropriate time out and adequate dosing of her anesthesia, the patient had been prepped and draped in the usual sterile fashion.  She was placed in the dorsal lithotomy position using Jefry stirrups.  The bladder had been drained with a red rubber catheter per nursing.  A weighted speculum was placed in the vagina.  The anterior lip of the cervix was grasped with a single-tooth tenaculum.  The cervix was injected at the 3 o'clock and 9 o'clock position with 1% lidocaine plain without any complications.  The cervix was then progressively dilated using Dumont dilators.  Rigid hysteroscopy was then performed with the above findings noted.  Sharp curettings were then obtained with a good cry throughout with tissue retrieved and sent for pathologic specimen.  The uterus was sounded to 8 cms.  The intrauterine contraceptive device was gently inserted per the 's instructions.    The cervical tenaculum was removed and the cervix was noted to be hemostatic.  All instrument and sponge counts were correct at the  end of the procedure.  The patient tolerated the procedure well.  There were no complications.  She was taken to the postoperative recovery room in stable condition.    CECIL Harden M.D.  8/10/2018  9:57 AM

## 2018-08-10 NOTE — ANESTHESIA PREPROCEDURE EVALUATION
Anesthesia Evaluation     Patient summary reviewed and Nursing notes reviewed   no history of anesthetic complications:  NPO Solid Status: > 8 hours  NPO Liquid Status: > 8 hours           Airway   Mallampati: II  TM distance: >3 FB  Neck ROM: full  no difficulty expected  Dental - normal exam     Pulmonary - negative pulmonary ROS and normal exam   Cardiovascular - normal exam    Rhythm: regular  Rate: normal    (+) valvular problems/murmurs,       Neuro/Psych  (+) headaches, psychiatric history Anxiety and Depression,     GI/Hepatic/Renal/Endo - negative ROS     Musculoskeletal (-) negative ROS    Abdominal    Substance History - negative use     OB/GYN negative ob/gyn ROS         Other - negative ROS                       Anesthesia Plan    ASA 2     MAC   (Pt told that intravenous sedation will be used as the primary anesthetic along with local anesthesia if necessary. Every effort will be made to make sure the patient is comfortable.     The patient was told they may or may not have recall for the procedure. It was further explained that if the MAC was not adequate that a general anesthetic with either an LMA or endotracheal tube would be required.     Will proceed with the plan of care.)  intravenous induction   Anesthetic plan and risks discussed with patient.

## 2018-08-10 NOTE — ANESTHESIA POSTPROCEDURE EVALUATION
Patient: Светлана Bates    Procedure Summary     Date:  08/10/18 Room / Location:  [facility] OR 2 /  [facility]    Anesthesia Start:  0921 Anesthesia Stop:  1008    Procedure:  DILATATION AND CURETTAGE HYSTEROSCOPY (N/A Uterus) Diagnosis:       Endometrial polyp      Irregular menstruation      (Endometrial polyp [N84.0])      (Irregular menstruation [N92.6])    Surgeon:  James Harden MD Provider:  Velia Foy CRNA    Anesthesia Type:  MAC ASA Status:  2          Anesthesia Type: MAC  Last vitals  BP   98/50 (08/10/18 1007)   Temp   98.3 °F (36.8 °C) (08/10/18 1007)   Pulse   80 (08/10/18 1007)   Resp   12 (08/10/18 1007)     SpO2   97 % (08/10/18 1007)     Post Anesthesia Care and Evaluation    Patient location during evaluation: PHASE II  Patient participation: complete - patient participated  Level of consciousness: awake and alert  Pain score: 0  Pain management: satisfactory to patient  Airway patency: patent  Anesthetic complications: No anesthetic complications  PONV Status: none  Cardiovascular status: acceptable and stable  Respiratory status: acceptable  Hydration status: acceptable

## 2018-08-15 LAB
LAB AP CASE REPORT: NORMAL
PATH REPORT.FINAL DX SPEC: NORMAL

## 2018-08-22 ENCOUNTER — OFFICE VISIT (OUTPATIENT)
Dept: OBSTETRICS AND GYNECOLOGY | Facility: CLINIC | Age: 48
End: 2018-08-22

## 2018-08-22 VITALS
DIASTOLIC BLOOD PRESSURE: 70 MMHG | BODY MASS INDEX: 27.16 KG/M2 | SYSTOLIC BLOOD PRESSURE: 120 MMHG | WEIGHT: 147.6 LBS | HEIGHT: 62 IN

## 2018-08-22 DIAGNOSIS — Z09 POSTOP CHECK: Primary | ICD-10-CM

## 2018-08-22 DIAGNOSIS — Z98.890 STATUS POST D&C: ICD-10-CM

## 2018-08-22 PROCEDURE — 99212 OFFICE O/P EST SF 10 MIN: CPT | Performed by: PHYSICIAN ASSISTANT

## 2018-08-22 NOTE — PROGRESS NOTES
"Subjective   Chief Complaint   Patient presents with   • Post-op     12 days post-op D&C Hysteroscopy; doing well       Светлана Bates is a 48 y.o. year old  presenting to be seen for postop visit  She is 12 days post op D&C hysteroscopy  She has done well post op with no bleeding since surgery. Has had normal bowel and bladder function  Pathology benign-polyp    Past Medical History:   Diagnosis Date   • Anemia    • Anxiety    • Body piercing     EARS   • Chest pain     REPORTS 2018 AND WAS DIAGNOSED WITH RIGHT SIDED PNEUMONIA AND PLEURICY. REPORTS EPISODE OF CHEST PAIN AGAIN IN 2018 AND DID NOT HAVE MEDICAL EVALUATION FOR THIS.    • Full dentures     INSTRUCTED NO ADHESIVES THE DOS   • Headache    • History of migraine    • History of pneumonia 2018   • Irregular heart beat     REPORTS \"SOMETIMES I FEEL IT MISS A BEAT BUT THAT MAY BE FROM MY ANXIETY\"   • Murmur     REPORTS \"WHEN I WAS BORN\"   • Restless leg     REPORTS HAS NEVER NEEDED MEDICATION FOR THIS BUT DOES EXPERIENCE INTERMITTENTLY   • Tattoos    • Wears glasses         Current Outpatient Prescriptions:   •  citalopram (CeleXA) 10 MG tablet, citalopram 10 mg tablet  1 and 1/2 tablets daily, Disp: , Rfl:   •  hydrOXYzine (VISTARIL) 25 MG capsule, take 1 capsule by mouth four times a day if needed FOR ANXIETY, Disp: , Rfl: 0  •  ibuprofen (ADVIL,MOTRIN) 600 MG tablet, Take 1 tablet by mouth Every 6 (Six) Hours As Needed for Moderate Pain ., Disp: 30 tablet, Rfl: 1   Allergies   Allergen Reactions   • Oxycodone Swelling   • Albuterol Other (See Comments)     REPORTS \"IT MADE MY HEART HURT AND RACE REALLY BAD\"      Past Surgical History:   Procedure Laterality Date   •  SECTION  1998   • COLONOSCOPY N/A 2018    Procedure: COLONOSCOPY;  Surgeon: Basil Rodarte MD;  Location: [facility] ENDOSCOPY;  Service: Gastroenterology   • D&C HYSTEROSCOPY N/A 8/10/2018    Procedure: DILATATION AND CURETTAGE HYSTEROSCOPY;  Surgeon: " "James Harden MD;  Location: [facility];  Service: Obstetrics/Gynecology   • DILATATION AND CURETTAGE  1995   • MOUTH SURGERY      FULL MOUTH EXTRACTION AT [facility]      Social History     Social History   • Marital status:      Spouse name: N/A   • Number of children: N/A   • Years of education: N/A     Occupational History   • Not on file.     Social History Main Topics   • Smoking status: Former Smoker     Packs/day: 0.50     Years: 15.00     Types: Cigarettes     Quit date: 2016   • Smokeless tobacco: Never Used      Comment: REPORTS SHE QUIT INTERMITTENTLY DURING THE YEARS OF SMOKING REPORTED   • Alcohol use No   • Drug use: No   • Sexual activity: Yes     Partners: Male     Birth control/ protection: None     Other Topics Concern   • Not on file     Social History Narrative   • No narrative on file      Family History   Problem Relation Age of Onset   • Hypertension Father    • Hypertension Mother    • Coronary artery disease Mother        Review of Systems   Constitutional: Negative.    Gastrointestinal: Negative.    Genitourinary: Negative.            Objective   /70   Ht 157.5 cm (62\")   Wt 67 kg (147 lb 9.6 oz)   LMP 08/03/2018 (Exact Date)   Breastfeeding? No   BMI 27.00 kg/m²     Physical Exam         Assessment and Plan  Светлана was seen today for post-op.    Diagnoses and all orders for this visit:    Postop check    Status post D&C      Patient Instructions   Encouraged to RTO one year for annual  RTO prn             This note was electronically signed.    Teresa Radford PA-C   August 22, 2018  "

## 2019-03-20 ENCOUNTER — TRANSCRIBE ORDERS (OUTPATIENT)
Dept: ADMINISTRATIVE | Facility: HOSPITAL | Age: 49
End: 2019-03-20

## 2019-03-20 DIAGNOSIS — Z13.820 ENCOUNTER FOR SCREENING FOR OSTEOPOROSIS: Primary | ICD-10-CM

## 2019-03-27 ENCOUNTER — APPOINTMENT (OUTPATIENT)
Dept: BONE DENSITY | Facility: HOSPITAL | Age: 49
End: 2019-03-27

## 2019-03-27 DIAGNOSIS — Z13.820 ENCOUNTER FOR SCREENING FOR OSTEOPOROSIS: ICD-10-CM

## 2019-03-27 PROCEDURE — 77080 DXA BONE DENSITY AXIAL: CPT

## 2020-09-29 ENCOUNTER — LAB REQUISITION (OUTPATIENT)
Dept: LAB | Facility: HOSPITAL | Age: 50
End: 2020-09-29

## 2020-09-29 DIAGNOSIS — Z11.59 ENCOUNTER FOR SCREENING FOR OTHER VIRAL DISEASES: ICD-10-CM

## 2020-09-29 PROCEDURE — U0004 COV-19 TEST NON-CDC HGH THRU: HCPCS | Performed by: INTERNAL MEDICINE

## 2020-09-30 LAB — SARS-COV-2 RNA NOSE QL NAA+PROBE: NOT DETECTED

## 2020-10-23 PROCEDURE — U0004 COV-19 TEST NON-CDC HGH THRU: HCPCS | Performed by: INTERNAL MEDICINE

## 2020-10-24 ENCOUNTER — LAB REQUISITION (OUTPATIENT)
Dept: LAB | Facility: HOSPITAL | Age: 50
End: 2020-10-24

## 2020-10-24 DIAGNOSIS — Z11.59 ENCOUNTER FOR SCREENING FOR OTHER VIRAL DISEASES: ICD-10-CM

## 2020-10-24 LAB — SARS-COV-2 RNA RESP QL NAA+PROBE: NOT DETECTED

## 2020-11-05 ENCOUNTER — LAB REQUISITION (OUTPATIENT)
Dept: LAB | Facility: HOSPITAL | Age: 50
End: 2020-11-05

## 2020-11-05 DIAGNOSIS — Z11.59 ENCOUNTER FOR SCREENING FOR OTHER VIRAL DISEASES: ICD-10-CM

## 2020-11-05 PROCEDURE — U0004 COV-19 TEST NON-CDC HGH THRU: HCPCS | Performed by: INTERNAL MEDICINE

## 2020-11-06 LAB
REF LAB TEST METHOD: NORMAL
SARS-COV-2 RNA RESP QL NAA+PROBE: NORMAL
SARS-COV-2 RNA RESP QL NAA+PROBE: NOT DETECTED

## 2020-11-19 ENCOUNTER — LAB REQUISITION (OUTPATIENT)
Dept: LAB | Facility: HOSPITAL | Age: 50
End: 2020-11-19

## 2020-11-19 DIAGNOSIS — Z11.59 ENCOUNTER FOR SCREENING FOR OTHER VIRAL DISEASES: ICD-10-CM

## 2020-11-19 PROCEDURE — U0004 COV-19 TEST NON-CDC HGH THRU: HCPCS | Performed by: INTERNAL MEDICINE

## 2020-11-20 LAB — SARS-COV-2 RNA RESP QL NAA+PROBE: NOT DETECTED

## 2020-11-30 ENCOUNTER — LAB REQUISITION (OUTPATIENT)
Dept: LAB | Facility: HOSPITAL | Age: 50
End: 2020-11-30

## 2020-11-30 DIAGNOSIS — Z11.59 ENCOUNTER FOR SCREENING FOR OTHER VIRAL DISEASES: ICD-10-CM

## 2020-11-30 PROCEDURE — U0004 COV-19 TEST NON-CDC HGH THRU: HCPCS | Performed by: INTERNAL MEDICINE

## 2020-12-01 LAB — SARS-COV-2 RNA RESP QL NAA+PROBE: NOT DETECTED

## 2020-12-28 ENCOUNTER — LAB REQUISITION (OUTPATIENT)
Dept: LAB | Facility: HOSPITAL | Age: 50
End: 2020-12-28

## 2020-12-28 DIAGNOSIS — Z11.59 ENCOUNTER FOR SCREENING FOR OTHER VIRAL DISEASES: ICD-10-CM

## 2020-12-28 PROCEDURE — U0004 COV-19 TEST NON-CDC HGH THRU: HCPCS | Performed by: INTERNAL MEDICINE

## 2020-12-29 LAB — SARS-COV-2 RNA RESP QL NAA+PROBE: NOT DETECTED

## 2021-01-17 ENCOUNTER — APPOINTMENT (OUTPATIENT)
Dept: PREADMISSION TESTING | Facility: HOSPITAL | Age: 51
End: 2021-01-17

## 2021-01-17 LAB — SARS-COV-2 RNA RESP QL NAA+PROBE: NOT DETECTED

## 2021-01-17 PROCEDURE — C9803 HOPD COVID-19 SPEC COLLECT: HCPCS

## 2021-01-17 PROCEDURE — U0004 COV-19 TEST NON-CDC HGH THRU: HCPCS

## 2021-01-19 ENCOUNTER — LAB REQUISITION (OUTPATIENT)
Dept: LAB | Facility: HOSPITAL | Age: 51
End: 2021-01-19

## 2021-01-19 DIAGNOSIS — R22.1 LOCALIZED SWELLING, MASS AND LUMP, NECK: ICD-10-CM

## 2021-01-19 DIAGNOSIS — J35.1 HYPERTROPHY OF TONSILS: ICD-10-CM

## 2021-01-19 PROCEDURE — 88305 TISSUE EXAM BY PATHOLOGIST: CPT | Performed by: OTOLARYNGOLOGY

## 2021-01-19 PROCEDURE — 88341 IMHCHEM/IMCYTCHM EA ADD ANTB: CPT | Performed by: OTOLARYNGOLOGY

## 2021-01-19 PROCEDURE — 88333 PATH CONSLTJ SURG CYTO XM 1: CPT | Performed by: OTOLARYNGOLOGY

## 2021-01-19 PROCEDURE — 88342 IMHCHEM/IMCYTCHM 1ST ANTB: CPT | Performed by: OTOLARYNGOLOGY

## 2021-02-02 LAB
CYTO UR: NORMAL
LAB AP CASE REPORT: NORMAL
LAB AP CLINICAL INFORMATION: NORMAL
LAB AP DIAGNOSIS COMMENT: NORMAL
LAB AP FLOW CYTOMETRY SUMMARY: NORMAL
PATH REPORT.ADDENDUM SPEC: NORMAL
PATH REPORT.FINAL DX SPEC: NORMAL
PATH REPORT.GROSS SPEC: NORMAL

## 2021-04-02 ENCOUNTER — LAB (OUTPATIENT)
Dept: LAB | Facility: HOSPITAL | Age: 51
End: 2021-04-02

## 2021-04-02 ENCOUNTER — TRANSCRIBE ORDERS (OUTPATIENT)
Dept: LAB | Facility: HOSPITAL | Age: 51
End: 2021-04-02

## 2021-04-02 DIAGNOSIS — C83.30: Primary | ICD-10-CM

## 2021-04-02 DIAGNOSIS — C83.30: ICD-10-CM

## 2021-04-02 LAB
BASOPHILS # BLD MANUAL: 0.07 10*3/MM3 (ref 0–0.2)
BASOPHILS NFR BLD AUTO: 1 % (ref 0–1.5)
DEPRECATED RDW RBC AUTO: 65.7 FL (ref 37–54)
ERYTHROCYTE [DISTWIDTH] IN BLOOD BY AUTOMATED COUNT: 18.5 % (ref 12.3–15.4)
HCT VFR BLD AUTO: 32.3 % (ref 34–46.6)
HGB BLD-MCNC: 10.2 G/DL (ref 12–15.9)
HYPOCHROMIA BLD QL: ABNORMAL
LYMPHOCYTES # BLD MANUAL: 1.12 10*3/MM3 (ref 0.7–3.1)
LYMPHOCYTES NFR BLD MANUAL: 16 % (ref 19.6–45.3)
LYMPHOCYTES NFR BLD MANUAL: 5 % (ref 5–12)
MCH RBC QN AUTO: 30.9 PG (ref 26.6–33)
MCHC RBC AUTO-ENTMCNC: 31.6 G/DL (ref 31.5–35.7)
MCV RBC AUTO: 97.9 FL (ref 79–97)
METAMYELOCYTES NFR BLD MANUAL: 3 % (ref 0–0)
MONOCYTES # BLD AUTO: 0.35 10*3/MM3 (ref 0.1–0.9)
MYELOCYTES NFR BLD MANUAL: 1 % (ref 0–0)
NEUTROPHILS # BLD AUTO: 5.02 10*3/MM3 (ref 1.7–7)
NEUTROPHILS NFR BLD MANUAL: 68 % (ref 42.7–76)
NEUTS BAND NFR BLD MANUAL: 4 % (ref 0–5)
PLATELET # BLD AUTO: 258 10*3/MM3 (ref 140–450)
PMV BLD AUTO: 11.4 FL (ref 6–12)
RBC # BLD AUTO: 3.3 10*6/MM3 (ref 3.77–5.28)
SCAN SLIDE: NORMAL
SMALL PLATELETS BLD QL SMEAR: ADEQUATE
VARIANT LYMPHS NFR BLD MANUAL: 2 % (ref 0–5)
WBC # BLD AUTO: 6.97 10*3/MM3 (ref 3.4–10.8)
WBC MORPH BLD: NORMAL

## 2021-04-02 PROCEDURE — 85007 BL SMEAR W/DIFF WBC COUNT: CPT

## 2021-04-02 PROCEDURE — 36415 COLL VENOUS BLD VENIPUNCTURE: CPT

## 2021-04-02 PROCEDURE — 85025 COMPLETE CBC W/AUTO DIFF WBC: CPT

## 2021-05-05 ENCOUNTER — OFFICE VISIT (OUTPATIENT)
Dept: OBSTETRICS AND GYNECOLOGY | Facility: CLINIC | Age: 51
End: 2021-05-05

## 2021-05-05 VITALS
HEIGHT: 61 IN | DIASTOLIC BLOOD PRESSURE: 64 MMHG | BODY MASS INDEX: 30.4 KG/M2 | SYSTOLIC BLOOD PRESSURE: 96 MMHG | WEIGHT: 161 LBS

## 2021-05-05 DIAGNOSIS — Z12.4 SCREENING FOR MALIGNANT NEOPLASM OF CERVIX: ICD-10-CM

## 2021-05-05 DIAGNOSIS — Z12.31 ENCOUNTER FOR SCREENING MAMMOGRAM FOR BREAST CANCER: ICD-10-CM

## 2021-05-05 DIAGNOSIS — Z01.419 ENCOUNTER FOR GYNECOLOGICAL EXAMINATION WITHOUT ABNORMAL FINDING: Primary | ICD-10-CM

## 2021-05-05 PROCEDURE — 99396 PREV VISIT EST AGE 40-64: CPT | Performed by: OBSTETRICS & GYNECOLOGY

## 2021-05-05 RX ORDER — METOPROLOL SUCCINATE 25 MG/1
TABLET, EXTENDED RELEASE ORAL
COMMUNITY
End: 2023-02-13

## 2021-05-05 RX ORDER — ATORVASTATIN CALCIUM 20 MG/1
TABLET, FILM COATED ORAL
COMMUNITY
End: 2022-08-12

## 2021-05-05 RX ORDER — CLOPIDOGREL BISULFATE 75 MG/1
TABLET ORAL
COMMUNITY
End: 2022-08-12

## 2021-05-05 RX ORDER — PROCHLORPERAZINE MALEATE 10 MG
TABLET ORAL
COMMUNITY

## 2021-05-05 RX ORDER — ISOSORBIDE MONONITRATE 120 MG/1
TABLET, EXTENDED RELEASE ORAL
COMMUNITY
End: 2022-08-12

## 2021-05-05 RX ORDER — PREDNISONE 50 MG/1
TABLET ORAL
COMMUNITY
End: 2022-08-12

## 2021-05-05 RX ORDER — HYDROCODONE BITARTRATE AND ACETAMINOPHEN 7.5; 325 MG/1; MG/1
TABLET ORAL
COMMUNITY
End: 2022-08-12

## 2021-05-05 RX ORDER — DIPHENHYDRAMINE HCL 25 MG
CAPSULE ORAL
COMMUNITY

## 2021-05-05 RX ORDER — BETAMETHASONE DIPROPIONATE 0.5 MG/G
CREAM TOPICAL
COMMUNITY
Start: 2021-04-02 | End: 2022-08-12

## 2021-05-05 RX ORDER — BUSPIRONE HYDROCHLORIDE 15 MG/1
TABLET ORAL
COMMUNITY
Start: 2020-10-22 | End: 2023-02-13 | Stop reason: SDUPTHER

## 2021-05-05 RX ORDER — ONDANSETRON 4 MG/1
TABLET, ORALLY DISINTEGRATING ORAL
COMMUNITY

## 2021-05-05 NOTE — PROGRESS NOTES
Subjective   Chief Complaint   Patient presents with   • Gynecologic Exam      last pap 2018 WNL,  Last Mammogram  2018 @ , Has one scheduled  already for , does not need order  . 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 year old  presenting to be seen for her annual exam.    Large B cell lymphome dx -- finishing up [facility]  SEXUAL Hx:  She is currently sexually active.  In the past year there has not been new sexual partners.    Condoms are not typically used.  She would not like to be screened for STD's at today's exam.  Current birth control method: not using any form of contraception and does not wish to get pregnant.  MENSTRUAL Hx:  No LMP recorded. (Menstrual status: Chemotherapy/radiation).  In the past 6 months her cycles have been absent.   Her menstrual flow has been absent.   Each month on average there are roughly 0 days of very heavy flow.    Intermenstrual bleeding is absent.    Post-coital bleeding is absent.  Dysmenorrhea: is not affecting her activities of daily living  PMS: is not affecting her activities of daily living  Her cycles are not a source of concern for her that she wishes to discuss today.  HEALTH Hx:  She exercises regularly: no (and has no plans to become more active).  She wears her seat belt:yes.  She has concerns about domestic violence: no.  OTHER COMPLAINTS:  Nothing else    The following portions of the patient's history were reviewed and updated as appropriate:  She  has a past medical history of Anemia, Anxiety, Body piercing, Chest pain, Full dentures, Headache, History of migraine, History of pneumonia (2018), Irregular heart beat, Murmur, Restless leg, Tattoos, and Wears glasses.  She does not have any pertinent problems on file.  She  has a past surgical history that includes  section (1998); Dilation and curettage of uterus (); Mouth surgery; Colonoscopy (N/A, 2018); and  d & c hysteroscopy (N/A, 8/10/2018).  Her family history includes Coronary artery disease in her mother; Hypertension in her father and mother.  She  reports that she quit smoking about 5 years ago. Her smoking use included cigarettes. She has a 7.50 pack-year smoking history. She has never used smokeless tobacco. She reports that she does not drink alcohol and does not use drugs.  Current Outpatient Medications   Medication Sig Dispense Refill   • busPIRone (BUSPAR) 15 MG tablet buspirone 15 mg tablet   TAKE 1 TABLET BY MOUTH TWICE DAILY AS NEEDED     • atorvastatin (LIPITOR) 20 MG tablet atorvastatin 20 mg tablet   TAKE 1 TABLET BY MOUTH DAILY     • betamethasone dipropionate 0.05 % cream APPLY THIN LAYER TOPICALLY TO THE AFFECTED AREA EVERY DAY     • citalopram (CeleXA) 10 MG tablet citalopram 10 mg tablet   1 and 1/2 tablets daily     • clopidogrel (PLAVIX) 75 MG tablet clopidogrel 75 mg tablet     • diphenhydrAMINE (BENADRYL) 25 mg capsule Allergy Relief (diphenhydramine) 25 mg capsule   takes M-W-F     • HYDROcodone-acetaminophen (NORCO) 7.5-325 MG per tablet hydrocodone 7.5 mg-acetaminophen 325 mg tablet   TAKE 1 TABLET BY MOUTH EVERY 6 HOURS AS NEEDED FOR SEVERE PAIN     • hydrOXYzine (VISTARIL) 25 MG capsule take 1 capsule by mouth four times a day if needed FOR ANXIETY  0   • ibrutinib (Imbruvica) 560 MG tablet tablet Imbruvica 560 mg tablet   Take 1 tablet every day by oral route.     • isosorbide mononitrate (IMDUR) 120 MG 24 hr tablet isosorbide mononitrate  mg tablet,extended release 24 hr     • metoprolol succinate XL (TOPROL-XL) 25 MG 24 hr tablet metoprolol succinate ER 25 mg tablet,extended release 24 hr     • ondansetron ODT (ZOFRAN-ODT) 4 MG disintegrating tablet ondansetron 4 mg disintegrating tablet   DISSOLVE 1 TABLET ON THE TONGUE EVERY 8 HOURS AS NEEDED FOR NAUSEA     • predniSONE (DELTASONE) 50 MG tablet prednisone 50 mg tablet   TAKE 2 TABLETS BY MOUTH EVERY DAY FOR 5 DAYS WITH  CHEMOTHERAPY     • prochlorperazine (COMPAZINE) 10 MG tablet prochlorperazine maleate 10 mg tablet       No current facility-administered medications for this visit.     Current Outpatient Medications on File Prior to Visit   Medication Sig   • busPIRone (BUSPAR) 15 MG tablet buspirone 15 mg tablet   TAKE 1 TABLET BY MOUTH TWICE DAILY AS NEEDED   • atorvastatin (LIPITOR) 20 MG tablet atorvastatin 20 mg tablet   TAKE 1 TABLET BY MOUTH DAILY   • betamethasone dipropionate 0.05 % cream APPLY THIN LAYER TOPICALLY TO THE AFFECTED AREA EVERY DAY   • citalopram (CeleXA) 10 MG tablet citalopram 10 mg tablet   1 and 1/2 tablets daily   • clopidogrel (PLAVIX) 75 MG tablet clopidogrel 75 mg tablet   • diphenhydrAMINE (BENADRYL) 25 mg capsule Allergy Relief (diphenhydramine) 25 mg capsule   takes M-W-F   • HYDROcodone-acetaminophen (NORCO) 7.5-325 MG per tablet hydrocodone 7.5 mg-acetaminophen 325 mg tablet   TAKE 1 TABLET BY MOUTH EVERY 6 HOURS AS NEEDED FOR SEVERE PAIN   • hydrOXYzine (VISTARIL) 25 MG capsule take 1 capsule by mouth four times a day if needed FOR ANXIETY   • ibrutinib (Imbruvica) 560 MG tablet tablet Imbruvica 560 mg tablet   Take 1 tablet every day by oral route.   • isosorbide mononitrate (IMDUR) 120 MG 24 hr tablet isosorbide mononitrate  mg tablet,extended release 24 hr   • metoprolol succinate XL (TOPROL-XL) 25 MG 24 hr tablet metoprolol succinate ER 25 mg tablet,extended release 24 hr   • ondansetron ODT (ZOFRAN-ODT) 4 MG disintegrating tablet ondansetron 4 mg disintegrating tablet   DISSOLVE 1 TABLET ON THE TONGUE EVERY 8 HOURS AS NEEDED FOR NAUSEA   • predniSONE (DELTASONE) 50 MG tablet prednisone 50 mg tablet   TAKE 2 TABLETS BY MOUTH EVERY DAY FOR 5 DAYS WITH CHEMOTHERAPY   • prochlorperazine (COMPAZINE) 10 MG tablet prochlorperazine maleate 10 mg tablet   • [DISCONTINUED] ibuprofen (ADVIL,MOTRIN) 600 MG tablet Take 1 tablet by mouth Every 6 (Six) Hours As Needed for Moderate Pain .     No  "current facility-administered medications on file prior to visit.     She is allergic to oxycodone and albuterol..    Social History    Tobacco Use      Smoking status: Former Smoker        Packs/day: 0.50        Years: 15.00        Pack years: 7.5        Types: Cigarettes        Quit date:         Years since quittin.3      Smokeless tobacco: Never Used      Tobacco comment: REPORTS SHE QUIT INTERMITTENTLY DURING THE YEARS OF SMOKING REPORTED    Review of Systems  Consitutional NEG: anorexia or night sweats    POS: nothing reported   Gastointestinal NEG: bloating, change in bowel habits, melena or reflux symptoms    POS: nothing reported   Genitourinary NEG: dysuria or hematuria    POS: nothing reported   Integument NEG: moles that are changing in size, shape, color or rashes    POS: nothing reported   Breast NEG: persistent breast lump, skin dimpling or nipple discharge    POS: nothing reported          Objective   BP 96/64   Ht 154.9 cm (61\")   Wt 73 kg (161 lb)   BMI 30.42 kg/m²     General:  well developed; well nourished  no acute distress   Skin:  No suspicious lesions seen   Thyroid: normal to inspection and palpation   Breasts:  Examined in supine position  Symmetric without masses or skin dimpling  Nipples normal without inversion, lesions or discharge  There are no palpable axillary nodes   Abdomen: soft, non-tender; no masses  no umbilical or inguinal hernias are present  no hepato-splenomegaly   Pelvis: Clinical staff was present for exam  External genitalia:  normal appearance of the external genitalia including Bartholin's and Hanna's glands.  :  urethral meatus normal;  Vaginal:  normal pink mucosa without prolapse or lesions.  Cervix:  normal appearance.  Uterus:  normal size, shape and consistency.  Adnexa:  normal bimanual exam of the adnexa.  Rectal:  digital rectal exam not performed; anus visually normal appearing.        Assessment   1. Normal PE  2. Large B cell - getting [facility]- 2 " more rounds with Etienne llanes.     Plan   1. PAP done  2. Discussed MMG- has one scheduled at [place] in June  3. Diet/exercise    No orders of the defined types were placed in this encounter.         This note was electronically signed.      May 5, 2021

## 2021-05-11 DIAGNOSIS — Z01.419 ENCOUNTER FOR GYNECOLOGICAL EXAMINATION WITHOUT ABNORMAL FINDING: ICD-10-CM

## 2021-05-14 ENCOUNTER — LAB (OUTPATIENT)
Dept: LAB | Facility: HOSPITAL | Age: 51
End: 2021-05-14

## 2021-05-14 ENCOUNTER — TRANSCRIBE ORDERS (OUTPATIENT)
Dept: LAB | Facility: HOSPITAL | Age: 51
End: 2021-05-14

## 2021-05-14 DIAGNOSIS — T45.1X5A ANEMIA DUE TO ANTINEOPLASTIC CHEMOTHERAPY: Primary | ICD-10-CM

## 2021-05-14 DIAGNOSIS — D64.81 ANEMIA DUE TO ANTINEOPLASTIC CHEMOTHERAPY: ICD-10-CM

## 2021-05-14 DIAGNOSIS — D64.81 ANEMIA DUE TO ANTINEOPLASTIC CHEMOTHERAPY: Primary | ICD-10-CM

## 2021-05-14 DIAGNOSIS — T45.1X5A ANEMIA DUE TO ANTINEOPLASTIC CHEMOTHERAPY: ICD-10-CM

## 2021-05-14 LAB
ALBUMIN SERPL-MCNC: 4 G/DL (ref 3.5–5.2)
ALBUMIN/GLOB SERPL: 1.7 G/DL
ALP SERPL-CCNC: 104 U/L (ref 39–117)
ALT SERPL W P-5'-P-CCNC: 29 U/L (ref 1–33)
ANION GAP SERPL CALCULATED.3IONS-SCNC: 7.3 MMOL/L (ref 5–15)
AST SERPL-CCNC: 34 U/L (ref 1–32)
BILIRUB SERPL-MCNC: 0.4 MG/DL (ref 0–1.2)
BUN SERPL-MCNC: 9 MG/DL (ref 6–20)
BUN/CREAT SERPL: 13.2 (ref 7–25)
CALCIUM SPEC-SCNC: 9.2 MG/DL (ref 8.6–10.5)
CHLORIDE SERPL-SCNC: 107 MMOL/L (ref 98–107)
CO2 SERPL-SCNC: 27.7 MMOL/L (ref 22–29)
CREAT SERPL-MCNC: 0.68 MG/DL (ref 0.57–1)
GFR SERPL CREATININE-BSD FRML MDRD: 91 ML/MIN/1.73
GLOBULIN UR ELPH-MCNC: 2.4 GM/DL
GLUCOSE SERPL-MCNC: 65 MG/DL (ref 65–99)
POTASSIUM SERPL-SCNC: 4.1 MMOL/L (ref 3.5–5.2)
PROT SERPL-MCNC: 6.4 G/DL (ref 6–8.5)
SODIUM SERPL-SCNC: 142 MMOL/L (ref 136–145)

## 2021-05-14 PROCEDURE — 80053 COMPREHEN METABOLIC PANEL: CPT

## 2021-05-14 PROCEDURE — 36415 COLL VENOUS BLD VENIPUNCTURE: CPT

## 2022-05-02 ENCOUNTER — TELEPHONE (OUTPATIENT)
Dept: ONCOLOGY | Age: 52
End: 2022-05-02

## 2022-05-02 NOTE — TELEPHONE ENCOUNTER
2:56 PM  Anaid Gross is a 45year-old female diagnosed with Diffuse Large B-Cell Lymphoma. She was initially diagnosed in November/December 2020. She was out of work and returned to work in June 2021. She then relapsed in January 2022. She has been out of work since February 2022. 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 She is currently on FMLA. She is not getting paid during this time off. She still has health insurance through her employer. She currently has assistance through work to pay for her insurance, but she is not currently bringing much in financially aside from that. She has applied for social security disability in April 2022. Her check has not come through and likely won't for several months, but it is estimated that she will bring in about $1,500/month in social security. Anaid Gross is , but has not lived with her . She has two sons and a daughter for support. At this time, she is not sure who her caregiver will be. She was educated that she will need a caregiver for 30 days minimum after CART. She was also informed that she would be required to stay locally for a minimum of 30 days after CART. A financial evaluation was conducted. Flavia's income over the last three months has been about $2,500. She has no other assets other than her home that she is still paying her mortgage on. Her mortgage is about $840/month. Based on this information, the patient qualifies for financial assistance. Will submit paper documentation to Samir Perez and coordinate lodging once the patient is closer to CART treatments.

## 2022-05-09 ENCOUNTER — HOSPITAL ENCOUNTER (OUTPATIENT)
Dept: ONCOLOGY | Age: 52
Discharge: HOME | End: 2022-05-09

## 2022-05-09 ENCOUNTER — HOSPITAL ENCOUNTER (OUTPATIENT)
Dept: ONCOLOGY | Age: 52
Setting detail: INFUSION SERIES
Discharge: HOME | End: 2022-05-09
Payer: COMMERCIAL

## 2022-05-09 ENCOUNTER — HOSPITAL ENCOUNTER (OUTPATIENT)
Dept: PULMONOLOGY | Age: 52
Discharge: HOME | End: 2022-05-09
Payer: COMMERCIAL

## 2022-05-09 ENCOUNTER — HOSPITAL ENCOUNTER (OUTPATIENT)
Dept: GENERAL RADIOLOGY | Age: 52
Discharge: HOME | End: 2022-05-09
Payer: COMMERCIAL

## 2022-05-09 ENCOUNTER — HOSPITAL ENCOUNTER (OUTPATIENT)
Dept: NON INVASIVE DIAGNOSTICS | Age: 52
Discharge: HOME | End: 2022-05-09
Payer: COMMERCIAL

## 2022-05-09 VITALS
TEMPERATURE: 98.4 F | SYSTOLIC BLOOD PRESSURE: 98 MMHG | BODY MASS INDEX: 31.93 KG/M2 | DIASTOLIC BLOOD PRESSURE: 59 MMHG | WEIGHT: 173.5 LBS | HEIGHT: 62 IN | RESPIRATION RATE: 18 BRPM | OXYGEN SATURATION: 98 % | HEART RATE: 83 BPM

## 2022-05-09 DIAGNOSIS — C83.31 DIFFUSE LARGE B-CELL LYMPHOMA, LYMPH NODES OF HEAD, FACE, AND NECK (HCC): ICD-10-CM

## 2022-05-09 LAB
A/G RATIO: 1.4 (ref 1.1–2.2)
ABO/RH: NORMAL
ALBUMIN SERPL-MCNC: 4.2 G/DL (ref 3.4–5)
ALP BLD-CCNC: 190 U/L (ref 40–129)
ALT SERPL-CCNC: 29 U/L (ref 10–40)
ANION GAP SERPL CALCULATED.3IONS-SCNC: 12 MMOL/L (ref 3–16)
ANISOCYTOSIS: ABNORMAL
ANTIBODY SCREEN: NORMAL
AST SERPL-CCNC: 30 U/L (ref 15–37)
BASOPHILS ABSOLUTE: 0 K/UL (ref 0–0.2)
BASOPHILS RELATIVE PERCENT: 0 %
BILIRUB SERPL-MCNC: <0.2 MG/DL (ref 0–1)
BUN BLDV-MCNC: 8 MG/DL (ref 7–20)
C-REACTIVE PROTEIN: 6.1 MG/L (ref 0–5.1)
CALCIUM SERPL-MCNC: 9.9 MG/DL (ref 8.3–10.6)
CHLORIDE BLD-SCNC: 103 MMOL/L (ref 99–110)
CO2: 24 MMOL/L (ref 21–32)
CREAT SERPL-MCNC: 0.6 MG/DL (ref 0.6–1.1)
EOSINOPHILS ABSOLUTE: 0 K/UL (ref 0–0.6)
EOSINOPHILS RELATIVE PERCENT: 0 %
FERRITIN: 1539 NG/ML (ref 15–150)
GFR AFRICAN AMERICAN: >60
GFR NON-AFRICAN AMERICAN: >60
GLUCOSE BLD-MCNC: 100 MG/DL (ref 70–99)
HAV IGM SER IA-ACNC: NORMAL
HBV SURFACE AB TITR SER: <3.5 MIU/ML
HCG QUALITATIVE: NEGATIVE
HCT VFR BLD CALC: 21.2 % (ref 36–48)
HEMOGLOBIN: 7.5 G/DL (ref 12–16)
IGG: 663 MG/DL (ref 700–1600)
LV EF: 48 %
LVEF MODALITY: NORMAL
LYMPHOCYTES ABSOLUTE: 2.1 K/UL (ref 1–5.1)
LYMPHOCYTES RELATIVE PERCENT: 21 %
MCH RBC QN AUTO: 33.7 PG (ref 26–34)
MCHC RBC AUTO-ENTMCNC: 35.2 G/DL (ref 31–36)
MCV RBC AUTO: 95.9 FL (ref 80–100)
MONOCYTES ABSOLUTE: 0.9 K/UL (ref 0–1.3)
MONOCYTES RELATIVE PERCENT: 9 %
NEUTROPHILS ABSOLUTE: 7.1 K/UL (ref 1.7–7.7)
NEUTROPHILS RELATIVE PERCENT: 70 %
PDW BLD-RTO: 12 % (ref 12.4–15.4)
PLATELET # BLD: 172 K/UL (ref 135–450)
PLATELET SLIDE REVIEW: ADEQUATE
PMV BLD AUTO: 9.1 FL (ref 5–10.5)
POTASSIUM SERPL-SCNC: 4.2 MMOL/L (ref 3.5–5.1)
RBC # BLD: 2.21 M/UL (ref 4–5.2)
SODIUM BLD-SCNC: 139 MMOL/L (ref 136–145)
TOTAL PROTEIN: 7.2 G/DL (ref 6.4–8.2)
WBC # BLD: 10.1 K/UL (ref 4–11)

## 2022-05-09 PROCEDURE — 86707 HEPATITIS BE ANTIBODY: CPT

## 2022-05-09 PROCEDURE — 85025 COMPLETE CBC W/AUTO DIFF WBC: CPT

## 2022-05-09 PROCEDURE — C8923 2D TTE W OR W/O FOL W/CON,CO: HCPCS

## 2022-05-09 PROCEDURE — 94761 N-INVAS EAR/PLS OXIMETRY MLT: CPT

## 2022-05-09 PROCEDURE — 86140 C-REACTIVE PROTEIN: CPT

## 2022-05-09 PROCEDURE — 94729 DIFFUSING CAPACITY: CPT

## 2022-05-09 PROCEDURE — 86901 BLOOD TYPING SEROLOGIC RH(D): CPT

## 2022-05-09 PROCEDURE — 86709 HEPATITIS A IGM ANTIBODY: CPT

## 2022-05-09 PROCEDURE — 93005 ELECTROCARDIOGRAM TRACING: CPT | Performed by: INTERNAL MEDICINE

## 2022-05-09 PROCEDURE — 86706 HEP B SURFACE ANTIBODY: CPT

## 2022-05-09 PROCEDURE — 80307 DRUG TEST PRSMV CHEM ANLYZR: CPT

## 2022-05-09 PROCEDURE — 80053 COMPREHEN METABOLIC PANEL: CPT

## 2022-05-09 PROCEDURE — 94010 BREATHING CAPACITY TEST: CPT

## 2022-05-09 PROCEDURE — 82728 ASSAY OF FERRITIN: CPT

## 2022-05-09 PROCEDURE — 94664 DEMO&/EVAL PT USE INHALER: CPT

## 2022-05-09 PROCEDURE — 86645 CMV ANTIBODY IGM: CPT

## 2022-05-09 PROCEDURE — 86694 HERPES SIMPLEX NES ANTBDY: CPT

## 2022-05-09 PROCEDURE — 82784 ASSAY IGA/IGD/IGG/IGM EACH: CPT

## 2022-05-09 PROCEDURE — 86787 VARICELLA-ZOSTER ANTIBODY: CPT

## 2022-05-09 PROCEDURE — 71046 X-RAY EXAM CHEST 2 VIEWS: CPT

## 2022-05-09 PROCEDURE — 36592 COLLECT BLOOD FROM PICC: CPT

## 2022-05-09 PROCEDURE — 84703 CHORIONIC GONADOTROPIN ASSAY: CPT

## 2022-05-09 PROCEDURE — 86900 BLOOD TYPING SEROLOGIC ABO: CPT

## 2022-05-09 PROCEDURE — 94726 PLETHYSMOGRAPHY LUNG VOLUMES: CPT

## 2022-05-09 PROCEDURE — 93356 MYOCRD STRAIN IMG SPCKL TRCK: CPT

## 2022-05-09 PROCEDURE — 86850 RBC ANTIBODY SCREEN: CPT

## 2022-05-09 NOTE — PROGRESS NOTES
Pre-Autologous Stem Cell Transplant Psychological Evaluation- 5/9/2022    PSYCHOSOCIAL ASSESSMENT/RECOMMENDATIONS    Based on this evaluation, the recommendation is to address contraindications prior to considering Ms. Blayne Fish for transplant. Ms. Blayne Fish will need to complete remainder of evaluation upon identifying a caregiver. Rocio Gu was informed of the following risk factors that have been identified and recommendations that are being made to the treatment team:      1) Ms. Blayne Fish does not have a caregiver plan. She has a few people who could assist, but nothing solidified. Recommend that patient identify a caregiver plan and then psychologist will complete caregiver portion of the evaluation. 2) Ms. Blayne Fish reports a history of anxiety attacks (heart palpitations and chest discomfort as primary symptoms) since 1988; however, described triggers of these attacks to include bending over and standing up. She reports a history of MI. Recommended following up with her physician regarding ruling out medical causes for her \"anxiety attacks\"   3) Patient lives two hours away and will have to relocate. Social work indicates that his expense would be covered and is therefore no longer a barrier. Additional suggestions:  1) Set alarms and utilize caregiver to remember to take medications (forgets 1-2 times monthly)     Identified caregivers: None yet, possibilities include youngest son, sister, or nephew   Protective factors: motivation for treatment   DSM-5 Diagnoses: Hx of Anxiety Disorder, Unspecified; Tobacco Use Disorder, In Sustained remission (quit 6 years ago)  ____________________________________________________________________________________________    Rocio Gu is a 42-year-old  , but  female with recurrent diffuse large cell non-Hodgkin's lymphoma referred for a psychological evaluation in preparation for autologous stem cell transplant.   She does not have an identified primary caregiver at this time. PAST MEDICAL HISTORY  Cee Rivers reports that she was diagnosed with lymphoma in 1/2021. She explains her experience with treatment thus far to be \"irritating, but manageable\" . Past medical history is significant for myocardial infarction in 2019.     KNOWLEDGE RELATED TO PROCEDURE    Diagnosis: Excellent  Procedure: Good  Risks and Benefits: Excellent  Outcome: Excellent  Lifestyle changes:    Hand-washing and hygiene:Poor, was provided additional education and expressed understanding   People and Places: Excellent   Bleeding precautions: Adequate, was provided additional education and expressed understanding   Pets: Excellent   Sun exposure: Excellent   Driving restrictions: Excellent   Staying within 45 minutes of the hospital: Excellent   Physical activity: Excellent   Sexual activity: Excellent   Tobacco use: Excellent   Alcohol use: Excellent   Illicit drug use (including marijuana): Excellent   Food safety: Adequate, was provided additional education and expressed understanding   Medication management: Excellent   Frequency of appointments: Excellent   When to call BMT doctor: Good   Going to the [facility] in case of emergency: Excellent   Possibility for admission and re-admission to the hospital: Excellent    ADHERENCE    No-showed appointments: Denies  Reports forgetting medications: 1-2 times a month  Using pill box: Yes; Use of pill box was recommended: Yes  Is willing to allow caregiver to manage medications immediately following procedure: Yes  Currently requires assistance with taking medications: Denies  Endorses history of going against the medical advise of a provider: Denies  History of issues with adherence to medical recommendations for comorbidities: Denies  Motivation for procedure: 10/10    PSYCHOSOCIAL HISTORY    Marital Status: , but  for 3 years   If , describes marriage as: still has contact with spouse in the form of him mowing the yard  Race/Ethnicity: \"White\"  Lives: [place] (approximately [place] minutes from [facility] lives alone  Reports stable housing: Denies  Reliable transportation: Yes  Children: 3 (ages 28, 22, 21)  Employment: On FMLA, full-time hospice nurse aid, has applied for social security   Financial concerns related to this procedure: Main concern is lodging  Childhood history: She was raised by mother and father in [facility] and has 1 sibling. Josselyn Rivera describes her childhood as \"good\"  Educational history: Denies difficulties in school (e.g., learning disability, accommodations). Highest level of education completed is 12 years. Jehovah's witness/spiritual beliefs: [facility] and denies Restoration beliefs that would affect her medical care   service: Denies  Legal history (e.g. DUI/DWI, MCC/shelter, arrests, probation/parole, bankruptcy): Bankruptcy in 2012  Current stressors: bills, health  Greatest stressor in life thus far: death of father 9/2021 from [address] [place] [address] Avenue: Bible, prayer, spend time alone, play games on phone, gardening  Physical activity: 10 minutes daily  Specific dietary needs: Denies; Sometimes reports being a picky eater, States that when she was [facility], she had troubles eating due to low appetite. Discussed bringing individually packaged foods to the hospital.  Supplements or over-the-counter drugs: multivitamin, benadryl, iron     SUPPORT SYSTEM    Caregiver evaluation will be completed once a caregiver is identified. Right now, potential caregivers include patient's nephew Aminata Peñaloza), youngest son Fer Botello, and sister Army Vega. Has completed an advanced directive: No, but directed patient to do so. PSYCHIATRIC HISTORY    Current mental health treatment: citalopram (20mg; has been taking for a few years), buspirone (15mg, BID, PRN, is taking once a month), hydroxyzine (25mg, PRN, is taking 1-2 times monthly).  These are prescribed by her PCP. Past mental health treatment: Denies  Family psychiatric history: Lewy Body Dementia (father diagnosed in 2019)    Depression: gets down about not having hair; denies history of depressive episodes  Suicidal ideation: passive thoughts of death in the past, but denied suicidal ideation, intent, or plan. Denies passive thoughts of death for the past 15 years  Homicidal ideation: Denies  Past suicide attempts: Denies  Anxiety (i.e. Generalized Anxiety Disorder, health or treatment related anxiety, panic attacks, obsessions, or compulsions): \"Not more than normal\"; Panic attacks: heart palpitations, sweating, chest pain/discomfort and fear of dying; states last anxiety attack was 1-2 weeks ago. Has these episodes a few times a month. States that these are triggered by standing up or bending over. Discussed with patient that these are not common triggers for anxiety attacks and recommended follow-up with physician regarding these episodes. Explains that she has had these attacks since 1988. Medical Anxiety (e.g. needles, pills, MRI, hospitals): Denies  Eating disordered behavior (I.e. binging, purging, other compensatory behaviors, concern about body image): Denies  Lucy: Denies   Psychosis: Denies  Post-traumatic stress disorder: Denies  Developmental or learning disability: Denies  Sleep: 8 hours per night; Denies difficulties with sleep    THE DEPRESSION, ANXIETY, STRESS SCALE- 21 ITEMS (RAMONA-21) :   Typical cutoff scores:  Depression = 1 (Normal) (0-9=Normal, 10-13= Mild, 14-20= Moderate, 21-27= Severe, 28+= Extremely Severe)  Anxiety = 2 (Normal) (0-9=Normal, 10-13= Mild, 14-20= Moderate, 21-27= Severe, 28+= Extremely Severe)  Stress= 0 (Normal) (0-14=Normal, 15-18= Mild, 19-25= Moderate, 26-33= Severe, 34+= Extremely Severe)    When compared to a sample of the general population she scored in the following percentiles:  Depression= 31.8 (Normal range)  Anxiety= 51.6 (Normal range)  Stress= 13 (Normal range)    BRIEF COPING ORIENTATION TO PROBLEMS EXPERIENCED INVENTORY (BRIEF-COPE):    Results indicate primarily an Approach coping style. This coping style is characterized by the subscales of active coping, positive reframing, planning, acceptance, seeking emotional support, and seeking informational support. Approach Coping is associated with more helpful responses to adversity, including adaptive practical adjustment, better physical health outcomes and more stable emotional responding. Approach Coping:   Percentile= 99.9 (Raw score= 43)  Avoidant Coping   Percentile= 0.3 (Raw score=14)    WORLD HEALTH ORGANIZATION QUALITY OF LIFE ASSESSMENT (WHOQOL-BREF):     Scores indicate her quality of life in the following areas in percentiles:  Physical Health: 78.8 percentile (Transformed score= 88)  Psychological Health: 89.3 percentile (Transformed score= 88)  Social Relationships: 69.9 percentile (Transformed score= 81)  Environment: 92.8 percentile (Transformed score= 94)    SUBSTANCE USE HISTORY    Tobacco: 6 years ago quit smoking, was smoking 1/2 pack per day for 12 years  Alcohol: Reports not drinking alcohol. Denies history of problematic alcohol use. Illicit drugs: Denies     Oj Bell does not foresee difficulties from abstaining from these substances immediately following her procedure. NEUROCOGNITIVE FUNCTIONING    Oj Bell denies a history of closed head injury, seizures, or stroke. PATIENT: RAPID ESTIMATE OF ADULT LITERACY IN MEDICINE  REALM-R: 8/8; REALM-SF Score: N/A  (0= <3rd grade reading level; 1-3= 4-6th grade reading level; 4-6= 7-8th grade reading level; >7= >9th grade reading level)    PATIENT ABIGAIL COGNITIVE ASSESSMENT (Version 1): 27/30 (Within Normal Limits)  Difficulty noted in the area(s) of: None  Normal scores are ?26     BEHAVIORAL OBSERVATIONS    She arrived early for her scheduled appointment.  She presented with her mother and was dressed appropriately and groomed neatly. During the evaluation, she presented with normal speech and logical/linear thought processes. She was cooperative and friendly. Her mood was good and affect was euthymic. STANDARD ASSESSMENT FOR INTEGRATING DATA  Will be completed once caregiver portion of evaluation has been completed. -------------------------------------------------------------------------------------    Sharmila Herring Psy.D., Clinical Psychologist      Madi Ya was seen for a pre-BMT/CAR-T psychological evaluation. There were no urgent psychological concerns (e.g. suicidal or homicidal ideation). Full report to follow. Prior to the evaluation, patient and/or support system was informed of the purposes of the evaluation, which include identification of any psychosocial barriers to successful BMT/CAR-T. Educated patient regarding psychologist's role in making treatment recommendations to patient's treatment team regarding patient's candidacy for BMT/CAR-T from a psychological standpoint. Discussed limits of confidentiality (e.g., documenting in patient's medical record, coordinating with team, abuse of vulnerable person, court subpoena, and/or being a risk to self or others). Also explained to patient that provider sees patient's while hospitalized in the [place] [id number]. Patient expressed understanding and was agreeable to complete the evaluation. All measures completed today were sent to medical records to be uploaded into patient's chart.

## 2022-05-09 NOTE — PROGRESS NOTES
Labs drawn from PICC line per order. Dressing was not occlusive upon arrival therefore dressing changed. Pt discharged to home.

## 2022-05-09 NOTE — ONCOLOGY
Transplant Coordinator met with patient and mother for Autologous PBSCT education. Recent PET scan reviewed and patient aware of scan indicating remission and plan is to move forward with auto BMT vs CAR-T. Discussed the followin. Pre-transplant evaluation and insurance approval process  2. Insertion of Trifusion central line for apheresis collection and PBSCT  3. Mobilization using GCSF +/- mobilization  4. Apheresis collection process  5. Admit to [place] [id number] for BEAM chemotherapy and PBSCT. Approx 3 week hospital stay. 6. Discharge criteria and need to stay in local lodging. 7. Need for 24H caregiver x 7-14 days (per Dr. Delroy Galdamez recommendation)  8. Restaging PET scan at Day 60  9. Vaccinations approx 6-12 months post transplant. Patient does not have solid caregiver. Explained must have to move forward with collection and BMT at [facility] Reached out to [facility], Dr. Jacqueline Gar Nurse, Barbara Padilla. [phone]. Will discuss case.

## 2022-05-10 LAB
EKG ATRIAL RATE: 68 BPM
EKG DIAGNOSIS: NORMAL
EKG P AXIS: 54 DEGREES
EKG P-R INTERVAL: 142 MS
EKG Q-T INTERVAL: 442 MS
EKG QRS DURATION: 88 MS
EKG QTC CALCULATION (BAZETT): 469 MS
EKG R AXIS: 25 DEGREES
EKG T AXIS: 52 DEGREES
EKG VENTRICULAR RATE: 68 BPM
HEPATITIS BE ANTIBODY: NEGATIVE

## 2022-05-10 PROCEDURE — 93010 ELECTROCARDIOGRAM REPORT: CPT | Performed by: INTERNAL MEDICINE

## 2022-05-11 LAB
CYTOMEGALOVIRUS IGM ANTIBODY: <8 AU/ML
VARICELLA ZOSTER AB IGM: 0 ISR
VZV IGG SER QL IA: 603.8 IV

## 2022-05-11 NOTE — PROCEDURES
[address]               [address]                               PULMONARY FUNCTION    PATIENT NAME: Jessica Benitez                       :        1970  MED REC NO:   [id number]                          ROOM:  ACCOUNT NO:   [de-identified]                           ADMIT DATE: 2022  PROVIDER:     Simeon Wang MD    DATE OF PROCEDURE:  2022    INDICATION:  Lymphoma. BMI:  32.1.    TOBACCO HISTORY:  The patient smoked half a pack of cigarettes a day for  six years, but quit 49 years ago. Spirometry data is acceptable and reproducible. Lung volumes performed via plethysmography. Diffusion capacity adjusted for hemoglobin at 7.5 via [address]. Room air oxygen saturation is 98%. SPIROMETRY:  FEV1 to FVC ratio is 81%. FEV1 is 2.17, which is 89% of  predicted. FVC is 2.68, which is 86% of predicted. Lung volumes show total lung capacity of 4.1, which is 89% of predicted. Residual volume is 1.67, which is 97% of predicted. Diffusion capacity when adjusted for hemoglobin of 7.5 is 19.66 which is  83% of predicted. IMPRESSION:  1. Normal spirometry. 2.  Normal lung volumes. 3.  Normal diffusion capacity.         Brittany Santos MD    D: 2022 8:56:02       T: 2022 11:12:30     [email or web address]  Job#: [id number]     Doc#: [id number]    CC:

## 2022-05-12 LAB
AMPHETAMINES SCREEN BLOOD: NEGATIVE NG/ML
BARBITURATES SCREEN BLOOD: NEGATIVE NG/ML
BENZODIAZEPINES SCREEN BLOOD: NEGATIVE NG/ML
BUPRENORPHINE: NEGATIVE NG/ML
CANNABINOID SCREEN BLOOD: NEGATIVE NG/ML
COCAINE SCREEN BLOOD: NEGATIVE NG/ML
HERPES TYPE 1/2 IGM COMBINED: 0.87 IV
HERPES TYPE I/II IGG COMBINED: >22.4 IV
Lab: NORMAL
METHADONE SCREEN BLOOD: NEGATIVE NG/ML
METHAMPHETAMINES SERUM/ PLASMA: NEGATIVE NG/ML
OPIATES SCREEN BLOOD: NEGATIVE NG/ML
OXYCODONE: NEGATIVE NG/ML
PHENCYCLIDINE SCREEN BLOOD: NEGATIVE NG/ML

## 2022-05-18 ENCOUNTER — OFFICE VISIT (OUTPATIENT)
Dept: CARDIOLOGY CLINIC | Age: 52
End: 2022-05-18
Payer: COMMERCIAL

## 2022-05-18 ENCOUNTER — CLINICAL DOCUMENTATION (OUTPATIENT)
Dept: ONCOLOGY | Age: 52
End: 2022-05-18

## 2022-05-18 VITALS
WEIGHT: 171.8 LBS | DIASTOLIC BLOOD PRESSURE: 64 MMHG | HEIGHT: 61 IN | OXYGEN SATURATION: 97 % | HEART RATE: 102 BPM | SYSTOLIC BLOOD PRESSURE: 92 MMHG | BODY MASS INDEX: 32.44 KG/M2

## 2022-05-18 DIAGNOSIS — I50.22 CHRONIC SYSTOLIC CONGESTIVE HEART FAILURE (HCC): ICD-10-CM

## 2022-05-18 DIAGNOSIS — R06.02 SHORTNESS OF BREATH: Primary | ICD-10-CM

## 2022-05-18 DIAGNOSIS — E78.2 MIXED HYPERLIPIDEMIA: ICD-10-CM

## 2022-05-18 DIAGNOSIS — I25.10 NONOCCLUSIVE CORONARY ATHEROSCLEROSIS OF NATIVE CORONARY ARTERY: ICD-10-CM

## 2022-05-18 PROCEDURE — 99204 OFFICE O/P NEW MOD 45 MIN: CPT | Performed by: INTERNAL MEDICINE

## 2022-05-18 RX ORDER — ISOSORBIDE MONONITRATE 60 MG/1
TABLET, EXTENDED RELEASE ORAL
COMMUNITY
Start: 2022-03-28 | End: 2022-05-18 | Stop reason: ALTCHOICE

## 2022-05-18 RX ORDER — METOPROLOL SUCCINATE 25 MG/1
TABLET, EXTENDED RELEASE ORAL
Status: ON HOLD | COMMUNITY
Start: 2022-04-02 | End: 2022-06-27 | Stop reason: HOSPADM

## 2022-05-18 RX ORDER — FLUCONAZOLE 200 MG/1
TABLET ORAL
Status: ON HOLD | COMMUNITY
Start: 2022-04-21 | End: 2022-06-27 | Stop reason: HOSPADM

## 2022-05-18 RX ORDER — ATORVASTATIN CALCIUM 20 MG/1
TABLET, FILM COATED ORAL
Status: ON HOLD | COMMUNITY
End: 2022-06-27 | Stop reason: HOSPADM

## 2022-05-18 RX ORDER — DIPHENHYDRAMINE HCL 25 MG
CAPSULE ORAL
COMMUNITY

## 2022-05-18 RX ORDER — BUSPIRONE HYDROCHLORIDE 15 MG/1
TABLET ORAL
COMMUNITY
Start: 2020-10-22

## 2022-05-18 RX ORDER — CITALOPRAM 20 MG/1
TABLET ORAL
COMMUNITY
Start: 2022-03-19

## 2022-05-18 RX ORDER — ASPIRIN 81 MG/1
81 TABLET ORAL DAILY
Qty: 30 TABLET | Refills: 5 | Status: ON HOLD | OUTPATIENT
Start: 2022-05-18 | End: 2022-06-27 | Stop reason: HOSPADM

## 2022-05-18 RX ORDER — OLANZAPINE 10 MG/1
TABLET ORAL
Status: ON HOLD | COMMUNITY
Start: 2022-04-21 | End: 2022-06-27 | Stop reason: HOSPADM

## 2022-05-18 RX ORDER — LEVOFLOXACIN 500 MG/1
TABLET, FILM COATED ORAL
Status: ON HOLD | COMMUNITY
Start: 2022-05-13 | End: 2022-06-27 | Stop reason: HOSPADM

## 2022-05-18 RX ORDER — CLOPIDOGREL BISULFATE 75 MG/1
TABLET ORAL
COMMUNITY
End: 2022-05-18 | Stop reason: ALTCHOICE

## 2022-05-18 RX ORDER — ACYCLOVIR 400 MG/1
TABLET ORAL
COMMUNITY
Start: 2022-04-21

## 2022-05-18 RX ORDER — HYDROXYZINE PAMOATE 25 MG/1
CAPSULE ORAL
COMMUNITY
Start: 2018-06-06

## 2022-05-18 RX ORDER — PRAVASTATIN SODIUM 20 MG
TABLET ORAL
Status: ON HOLD | COMMUNITY
Start: 2022-03-08 | End: 2022-06-27 | Stop reason: HOSPADM

## 2022-05-18 ASSESSMENT — ENCOUNTER SYMPTOMS
COLOR CHANGE: 0
VOMITING: 0
ABDOMINAL PAIN: 0
COUGH: 0
BACK PAIN: 0
EYE DISCHARGE: 0
WHEEZING: 0
BLOOD IN STOOL: 0
FACIAL SWELLING: 0
ABDOMINAL DISTENTION: 0
SHORTNESS OF BREATH: 0
CHEST TIGHTNESS: 0

## 2022-05-18 NOTE — PROGRESS NOTES
[facility]     Outpatient Cardiology         Patient Name:  Wendy Lancaster  Requesting Physician: No admitting provider for patient encounter. Primary Care Physician: Rod Graham MD    Reason for Consultation/Chief Complaint:   Chief Complaint   Patient presents with    Pre-op Exam     stem cell transplant         History of Present Illness:    RAMON Roman a 45 y.o. female with PMH of diffuse large b-cell lymphoma. Here for new onset systolic heart failure and CV risk assessment for stem cell transplant. Referred by Dr. Deejay Caraballo. CAD, had an NSTEMI a couple years back, no PCI performed. Since then asymptomatic. Will adjust medications today. New onset systolic heart failure, new diagnosis, could be chemo related, compensated. I would like her to get a stress test to rule out CAD. PMH  Past Medical History:   Diagnosis Date    Hyperlipidemia     Hypertension     NSTEMI (non-ST elevated myocardial infarction) ([facility] [address]        PSH  Past Surgical History:   Procedure Laterality Date    CARPAL TUNNEL RELEASE       SECTION      LITHOTRIPSY      TUBAL LIGATION          Social HIstory  Social History     Tobacco Use    Smoking status: Never Smoker    Smokeless tobacco: Never Used   Substance Use Topics    Alcohol use: Never    Drug use: Never       Family History  Family History   Problem Relation Age of Onset    Hypertension Mother     High Cholesterol Mother     Heart Surgery Mother     Dementia Father        Allergies   Allergies   Allergen Reactions    Albuterol        Medications:     Home Medications:  Were reviewed and are listed in nursing record. and/or listed below    Prior to Admission medications    Medication Sig Start Date End Date Taking?  Authorizing Provider   acyclovir (ZOVIRAX) 400 MG tablet Acyclovir Oral     2 tablets 2x daily   active 22  Yes Historical Provider, MD   atorvastatin (LIPITOR) 20 MG tablet atorvastatin 20 mg tablet TAKE 1 TABLET BY MOUTH DAILY   Yes Historical Provider, MD   busPIRone (BUSPAR) 15 MG tablet Buspirone Oral    2x daily prn    active 10/22/20  Yes Historical Provider, MD   citalopram (CELEXA) 20 MG tablet TAKE 1 TABLET BY MOUTH EVERY DAY 3/19/22  Yes Historical Provider, MD   clopidogrel (PLAVIX) 75 MG tablet Clopidogrel Oral    daily    active   Yes Historical Provider, MD   diphenhydrAMINE (BENADRYL) 25 MG capsule Allergy Relief (diphenhydramine) 25 mg capsule   takes M-W-F   Yes Historical Provider, MD   fluconazole (DIFLUCAN) 200 MG tablet  4/21/22  Yes Historical Provider, MD   hydrOXYzine (VISTARIL) 25 MG capsule Hydroxyzine Pamoate Oral    2x daily prn    active 6/6/18  Yes Historical Provider, MD   isosorbide mononitrate (IMDUR) 60 MG extended release tablet  3/28/22  Yes Historical Provider, MD   levoFLOXacin (LEVAQUIN) 500 MG tablet  5/13/22  Yes Historical Provider, MD   metoprolol succinate (TOPROL XL) 25 MG extended release tablet  4/2/22  Yes Historical Provider, MD   OLANZapine (ZYPREXA) 10 MG tablet  4/21/22  Yes Historical Provider, MD   pravastatin (PRAVACHOL) 20 MG tablet TAKE 1 TABLET BY MOUTH EVERY DAY 3/8/22  Yes Historical Provider, MD        Review of Systems   Constitutional: Negative for activity change, appetite change, diaphoresis, fatigue, fever and unexpected weight change. HENT: Negative for congestion, facial swelling, mouth sores and nosebleeds. Eyes: Negative for discharge and visual disturbance. Respiratory: Negative for cough, chest tightness, shortness of breath and wheezing. Cardiovascular: Negative for chest pain, palpitations and leg swelling. Gastrointestinal: Negative for abdominal distention, abdominal pain, blood in stool and vomiting. Endocrine: Negative for cold intolerance, heat intolerance and polyuria. Genitourinary: Negative for difficulty urinating, dysuria, frequency and hematuria.    Musculoskeletal: Negative for back pain, joint swelling, myalgias and neck pain. Skin: Negative for color change, pallor and rash. Allergic/Immunologic: Negative for immunocompromised state. Neurological: Negative for dizziness, syncope, weakness, light-headedness, numbness and headaches. Hematological: Negative for adenopathy. Does not bruise/bleed easily. Psychiatric/Behavioral: Negative for behavioral problems, confusion, decreased concentration and suicidal ideas. The patient is not nervous/anxious. Vitals:    05/18/22 1326   BP: 92/64   Pulse: 102   SpO2: 97%    Weight: 171 lb 12.8 oz (77.9 kg)       Vitals:    05/18/22 1326   BP: 92/64   Site: Right Upper Arm   Position: Sitting   Cuff Size: Medium Adult   Pulse: 102   SpO2: 97%   Weight: 171 lb 12.8 oz (77.9 kg)   Height: 5' 1\" (1.549 m)       BP Readings from Last 3 Encounters:   05/18/22 92/64   05/09/22 (!) 98/59       Wt Readings from Last 3 Encounters:   05/18/22 171 lb 12.8 oz (77.9 kg)   05/09/22 173 lb 8 oz (78.7 kg)       Physical Exam  Constitutional:       General: She is not in acute distress. Appearance: She is well-developed. She is not diaphoretic. HENT:      Head: Normocephalic and atraumatic. Eyes:      Pupils: Pupils are equal, round, and reactive to light. Neck:      Thyroid: No thyromegaly. Vascular: No JVD. Cardiovascular:      Rate and Rhythm: Normal rate and regular rhythm. Chest Wall: PMI is not displaced. Heart sounds: Normal heart sounds, S1 normal and S2 normal. No murmur heard. No friction rub. No gallop. Pulmonary:      Effort: Pulmonary effort is normal. No respiratory distress. Breath sounds: Normal breath sounds. No stridor. No wheezing or rales. Chest:      Chest wall: No tenderness. Abdominal:      General: Bowel sounds are normal. There is no distension. Palpations: Abdomen is soft. Tenderness: There is no abdominal tenderness. There is no guarding or rebound. Musculoskeletal:         General: No tenderness. Normal range of motion. Cervical back: Normal range of motion. Lymphadenopathy:      Cervical: No cervical adenopathy. Skin:     General: Skin is warm and dry. Findings: No erythema or rash. Neurological:      Mental Status: She is alert and oriented to person, place, and time. Coordination: Coordination normal.   Psychiatric:         Behavior: Behavior normal.         Thought Content: Thought content normal.         Judgment: Judgment normal.         Labs:       Lab Results   Component Value Date    WBC 10.1 05/09/2022    HGB 7.5 (L) 05/09/2022    HCT 21.2 (L) 05/09/2022    MCV 95.9 05/09/2022     05/09/2022     Lab Results   Component Value Date     05/09/2022    K 4.2 05/09/2022     05/09/2022    CO2 24 05/09/2022    BUN 8 05/09/2022    CREATININE 0.6 05/09/2022    GLUCOSE 100 (H) 05/09/2022    CALCIUM 9.9 05/09/2022    PROT 7.2 05/09/2022    LABALBU 4.2 05/09/2022    BILITOT <0.2 05/09/2022    ALKPHOS 190 (H) 05/09/2022    AST 30 05/09/2022    ALT 29 05/09/2022    LABGLOM >60 05/09/2022    GFRAA >60 05/09/2022    AGRATIO 1.4 05/09/2022         No results found for: CHOL  No results found for: TRIG  No results found for: HDL  No results found for: LDLCHOLESTEROL, LDLCALC  No results found for: LABVLDL, VLDL  No results found for: CHOLHDLRATIO    No results found for: INR, PROTIME    The ASCVD Risk score (Lionel Deluna, et al., 2013) failed to calculate for the following reasons:    Cannot find a previous HDL lab    Cannot find a previous total cholesterol lab      Imaging:       Last ECG (if available, Personally interpreted):  Normal sinus rhythm, no ischemic changes  Last Monitor/Holter (if available):    Last Stress (if available):    Last Cath (if available):    Last TTE/VANESSA(if available): 5/9/22   Summary   Left ventricular cavity size is normal. Normal left ventricular wall   thickness.  Overall left ventricular systolic function appears to be low   normal with an ejection fraction of 45-50%. No gross regional wall motion   abnormalities. Normal diastolic function. Global strain is -15.7%. Estimated   pulmonary artery systolic pressure is at 31 mmHg assuming a right atrial   pressure of 3 mmHg. Last CMR  (if available):    Last Coronary Artery Calcium Score: Ankle-brachial index:    Carotid ultrasound screening:    Abdominal aortic aneurysm screening:       Assessment / Plan:     Nonocclusive coronary atherosclerosis of native coronary artery  Reportedly had an angiogram in 2019 with borderline lesions although no PCI performed. She is unable to tolerate statins  Discontinue Plavix. Start aspirin 81 mg. Discontinue Imdur. Continue metoprolol XL    Chronic systolic congestive heart failure ([address]  Compensated. New finding. Likely chemo related although need to rule out worsening/progression of her CAD. Plan for Myoview prior to BMT    Mixed hyperlipidemia  Unable to tolerate statins. Consider Leqvio after BMT    Given her borderline blood pressure were unable to start any other heart failure medications. Continue metoprolol. She will likely benefit from Alma Rosa Alexander. Will reassess after BMT    I had the opportunity to review the clinical symptoms and presentation of Yung Grace. Patient's allergies and medications were reviewed and updated. Patient's past medical, surgical, social and family history were reviewed and updated. Patient's testing including laboratory, ECGs, monitor, imaging (TTE,VANESSA,CMR,cath) were reviewed. Tobacco use was discussed with the patient and educated on the negative effects. I have asked the patient to not utilize these agents. All questions and concerns were addressed to the patient/family. Alternatives to my treatment were discussed. The note was completed using EMR. Every effort wasmade to ensure accuracy; however, inadvertent computerized transcription errors may be present.     Thank you for allowing me to participate in thecare or [facility]  Laverne Good MD, [facility], [facility] [address]

## 2022-05-18 NOTE — ASSESSMENT & PLAN NOTE
Reportedly had an angiogram in 2019 with borderline lesions although no PCI performed. She is unable to tolerate statins  Discontinue Plavix. Start aspirin 81 mg. Discontinue Imdur.   Continue metoprolol XL

## 2022-05-18 NOTE — PROGRESS NOTES
Behavioral health intervention    Patient brought caregiver in today for an evaluation. However, patient and caregiver were scheduled for transplant education with coordinator today. Psychologist cannot meet with caregiver until after evaluation. Will call and complete telehealth evaluation with caregiver, Ori Cristobal ([phone]).

## 2022-05-18 NOTE — ASSESSMENT & PLAN NOTE
Compensated. New finding. Likely chemo related although need to rule out worsening/progression of her CAD.   Plan for Myoview prior to BMT

## 2022-05-23 ENCOUNTER — TELEPHONE (OUTPATIENT)
Dept: ONCOLOGY | Age: 52
End: 2022-05-23

## 2022-05-23 DIAGNOSIS — Z52.011 AUTOLOGOUS DONOR OF STEM CELLS: ICD-10-CM

## 2022-05-23 RX ORDER — LOPERAMIDE HYDROCHLORIDE 2 MG/1
2 CAPSULE ORAL PRN
Status: CANCELLED | OUTPATIENT
Start: 2022-06-01

## 2022-05-23 RX ORDER — PROCHLORPERAZINE MALEATE 10 MG
10 TABLET ORAL EVERY 6 HOURS PRN
Status: CANCELLED | OUTPATIENT
Start: 2022-06-01

## 2022-05-23 NOTE — TELEPHONE ENCOUNTER
Called patient to schedule pre-tx nutrition eval. Left message for callback.     Electronically signed by Tiny Hutchins RD, LD on 5/23/2022 at 12:04 PM

## 2022-05-25 ENCOUNTER — CLINICAL DOCUMENTATION (OUTPATIENT)
Dept: ONCOLOGY | Age: 52
End: 2022-05-25

## 2022-05-25 NOTE — PROGRESS NOTES
Pre-Autologous Stem Cell Transplant Psychological Evaluation- 5/25/2022    PSYCHOSOCIAL ASSESSMENT/RECOMMENDATIONS    Based on this evaluation, the recommendation is to continue with monitoring of risk factors. Martin Peguero was informed of the following risk factors that have been identified and recommendations that are being made to the treatment team:      1) History of mild anxiety that writer can follow-up with as needed. 2) Patient has developed a caregiver plan and plans to stay closer to the hospital in a hotel after discharge from the hospital.    Additional suggestions:  1) Set alarms and utilize caregiver to remember to take medications (forgets 1-2 times monthly)     Identified caregivers: 1) Ines Marcial (first week after discharge) 2) Honey Sanches (son; secpnd week after discharge)   Protective factors: motivation for treatment  · DSM-5 Diagnoses: Hx of Anxiety Disorder, Unspecified; Tobacco Use Disorder, In Sustained remission (quit 6 years ago)  ____________________________________________________________________________________________      SUPPORT SYSTEM    Identified primary caregiver: Ines Marcial  Relationship to patient: Nephew  Age: 30  Lives: [address] to stay with patient: Yes, for the first week after discharge from the hospital  Employment: full-time at [employer].  Does not foresee issues with getting off work  Adequate knowledge regarding procedure: Yes  Diagnosis: Excellent  Procedure: Excellent  Risks and Benefits: Excellent  Lifestyle changes:     Hand-washing and hygiene:Excellent    People and Places: Excellent    Bleeding precautions: Poor, was provided additional education and expressed understanding    Pets: Excellent    Sun exposure: Excellent    Driving restrictions: Excellent    Staying within 45 minutes of the hospital: Excellent    Physical activity: Excellent     Tobacco use: Excellent    Alcohol use: Excellent    Illicit drug use (including marijuana): effective partner with medical staff. Lifestyle Factors: 0) Able to modify & sustain needed changes- self initiated. Patient's Readiness Level Total Score: 1    Social Support System  Availability of Social Support System: 2) Good: Various individuals (e.g., minimum of two people) have been identified and are actively engaged in the patient's care. A back-up system, albeit limited, seems feasible. Functionality of Social Support System: 0) Excellent: Members of the support team have demonstrated initiative in learning and are already committed to and actively and effectively engaged in the patient's care. Appropriateness of Physical Living Space and Environment: 0) Excellent: Patient has excellent, long-term, permanent and adequate housing. Social Support System Total Score: 2    Psychological Stability and Psychopathology   Presence of Psychopathology: 2) Mild Psychopathology - Present or History of mild psychopathology (e.g., Adjustment disorder). Usually a self-limited problem without significant negative impact on level of functioning. No hospitalization needed. No History of SI/SA. Assessment of Depression: 0) No Clinical Depression  Assessment of Anxiety: 1) Mild Clinical Anxiety  History of Organic Psychopathology or Neurocognitive Impairment: 0) None: No history of disease or treatment induced psychiatric problem. Assessment of Current Cognitive Functionin) Cognitive Functioning Within Normal Limits; or MoCA / MMSE . 26. Influence of Personality Traits vs. Disorder: 0) None: No history of significant personality disorder or psychopathology/traits. Effect of Truthfulness vs. Deceptive Behavior in Presentation: 0) No evidence of deceptive behavior in history or at present. Overall Risk for Psychopathology: 2) Mild: History of poor coping with current or previous medical challenges or psychosocial stressors.  Only minimal, if any, psychiatric complications in response to illness, medical treatment or psychosocial stressors. Psychological Stability and Psychopathology Total Score: 5    Lifestyle and Effect of Substance Use  Alcohol Use/Abuse/Dependence: 0) None: No history of alcohol use. Alcohol Risk for Recidivism: 0) None: No history of Alcohol use (Audit = 0). Substance Use/Abuse/Dependence: 0) None: No history of illicit substance use; or abuse of prescribed substances. Substance Use Risk for Recidivism: 0) None: No history of illicit substance Use; or abuse of prescribed substances (DAST = 0). Nicotine Use/Abuse/Dependence: 1) Past use: Quit > 6 months ( \" - \" nicotine test).    Lifestyle and Effect of Substance Use Total Score: 1    -------------------------------------------------------------------------------------

## 2022-05-26 ENCOUNTER — HOSPITAL ENCOUNTER (OUTPATIENT)
Dept: NON INVASIVE DIAGNOSTICS | Age: 52
Discharge: HOME | End: 2022-05-26
Payer: COMMERCIAL

## 2022-05-26 DIAGNOSIS — E78.2 MIXED HYPERLIPIDEMIA: ICD-10-CM

## 2022-05-26 DIAGNOSIS — I50.22 CHRONIC SYSTOLIC CONGESTIVE HEART FAILURE (HCC): ICD-10-CM

## 2022-05-26 DIAGNOSIS — R06.02 SHORTNESS OF BREATH: ICD-10-CM

## 2022-05-26 DIAGNOSIS — I25.10 NONOCCLUSIVE CORONARY ATHEROSCLEROSIS OF NATIVE CORONARY ARTERY: ICD-10-CM

## 2022-05-26 LAB
LV EF: 73 %
LVEF MODALITY: NORMAL

## 2022-05-26 PROCEDURE — A9502 TC99M TETROFOSMIN: HCPCS | Performed by: INTERNAL MEDICINE

## 2022-05-26 PROCEDURE — 93017 CV STRESS TEST TRACING ONLY: CPT

## 2022-05-26 PROCEDURE — 3430000000 HC RX DIAGNOSTIC RADIOPHARMACEUTICAL: Performed by: INTERNAL MEDICINE

## 2022-05-26 PROCEDURE — 78452 HT MUSCLE IMAGE SPECT MULT: CPT

## 2022-05-26 RX ADMIN — TETROFOSMIN 30 MILLICURIE: 1.38 INJECTION, POWDER, LYOPHILIZED, FOR SOLUTION INTRAVENOUS at 13:57

## 2022-05-26 RX ADMIN — TETROFOSMIN 10 MILLICURIE: 1.38 INJECTION, POWDER, LYOPHILIZED, FOR SOLUTION INTRAVENOUS at 12:42

## 2022-05-27 ENCOUNTER — HOSPITAL ENCOUNTER (OUTPATIENT)
Dept: INTERVENTIONAL RADIOLOGY/VASCULAR | Age: 52
Discharge: HOME | End: 2022-05-27
Payer: COMMERCIAL

## 2022-05-27 VITALS
DIASTOLIC BLOOD PRESSURE: 65 MMHG | HEART RATE: 75 BPM | WEIGHT: 170 LBS | OXYGEN SATURATION: 95 % | TEMPERATURE: 98.4 F | BODY MASS INDEX: 32.1 KG/M2 | SYSTOLIC BLOOD PRESSURE: 104 MMHG | HEIGHT: 61 IN | RESPIRATION RATE: 16 BRPM

## 2022-05-27 DIAGNOSIS — C83.31 DIFFUSE LARGE B-CELL LYMPHOMA, LYMPH NODES OF HEAD, FACE, AND NECK (HCC): ICD-10-CM

## 2022-05-27 DIAGNOSIS — C83.30 DIFFUSE LARGE B-CELL LYMPHOMA, UNSPECIFIED BODY REGION (HCC): ICD-10-CM

## 2022-05-27 LAB
HCT VFR BLD CALC: 32.2 % (ref 36–48)
HEMOGLOBIN: 10.9 G/DL (ref 12–16)
INR BLD: 1 (ref 0.87–1.14)
MCH RBC QN AUTO: 34.5 PG (ref 26–34)
MCHC RBC AUTO-ENTMCNC: 33.9 G/DL (ref 31–36)
MCV RBC AUTO: 102 FL (ref 80–100)
PDW BLD-RTO: 16.5 % (ref 12.4–15.4)
PLATELET # BLD: 267 K/UL (ref 135–450)
PMV BLD AUTO: 8.5 FL (ref 5–10.5)
PROTHROMBIN TIME: 13.1 SEC (ref 11.7–14.5)
RBC # BLD: 3.16 M/UL (ref 4–5.2)
WBC # BLD: 2.2 K/UL (ref 4–11)

## 2022-05-27 PROCEDURE — 99152 MOD SED SAME PHYS/QHP 5/>YRS: CPT

## 2022-05-27 PROCEDURE — C1894 INTRO/SHEATH, NON-LASER: HCPCS

## 2022-05-27 PROCEDURE — 77001 FLUOROGUIDE FOR VEIN DEVICE: CPT

## 2022-05-27 PROCEDURE — 6360000002 HC RX W HCPCS

## 2022-05-27 PROCEDURE — 36558 INSERT TUNNELED CV CATH: CPT

## 2022-05-27 PROCEDURE — C1769 GUIDE WIRE: HCPCS

## 2022-05-27 PROCEDURE — 85610 PROTHROMBIN TIME: CPT

## 2022-05-27 PROCEDURE — 2709999900 HC NON-CHARGEABLE SUPPLY

## 2022-05-27 PROCEDURE — C1751 CATH, INF, PER/CENT/MIDLINE: HCPCS

## 2022-05-27 PROCEDURE — 85027 COMPLETE CBC AUTOMATED: CPT

## 2022-05-27 NOTE — H&P
Patient:  Sergei Poole   :   1970      Relevant clinical history, particularly as it involves the pending procedure, was reviewed and discussed. The procedure including risks, benefits, and alternatives was discussed at length with the patient (or designated family member) and all questions were answered. Informed consent to proceed with the procedure was given. Vital signs were monitored and documented by the Radiology nurse. Targeted physical examination  Heart : regular rate and rhythm  Lungs : clear, breathing easily  Condition : stable    [facility] nurses notes reviewed and agreed.     Past Medical History:        Diagnosis Date    Hyperlipidemia     Hypertension     NSTEMI (non-ST elevated myocardial infarction) ([facility] [address]        Past Surgical History:           Procedure Laterality Date    CARPAL TUNNEL RELEASE       SECTION      LITHOTRIPSY      TUBAL LIGATION         Allergies:  Albuterol    Medications:   Home Meds  Current Outpatient Medications on File Prior to Encounter   Medication Sig Dispense Refill    acyclovir (ZOVIRAX) 400 MG tablet Acyclovir Oral     2 tablets 2x daily   active      atorvastatin (LIPITOR) 20 MG tablet atorvastatin 20 mg tablet   TAKE 1 TABLET BY MOUTH DAILY      busPIRone (BUSPAR) 15 MG tablet Buspirone Oral    2x daily prn    active      citalopram (CELEXA) 20 MG tablet TAKE 1 TABLET BY MOUTH EVERY DAY      diphenhydrAMINE (BENADRYL) 25 MG capsule Allergy Relief (diphenhydramine) 25 mg capsule   takes --      fluconazole (DIFLUCAN) 200 MG tablet       hydrOXYzine (VISTARIL) 25 MG capsule Hydroxyzine Pamoate Oral    2x daily prn    active      levoFLOXacin (LEVAQUIN) 500 MG tablet       metoprolol succinate (TOPROL XL) 25 MG extended release tablet       OLANZapine (ZYPREXA) 10 MG tablet       pravastatin (PRAVACHOL) 20 MG tablet TAKE 1 TABLET BY MOUTH EVERY DAY      aspirin EC 81 MG EC tablet Take 1 tablet by mouth daily 30 tablet 5 No current facility-administered medications on file prior to encounter. Current Meds  No current facility-administered medications for this encounter.         ASA 2 - Patient with mild systemic disease with no functional limitations    II (soft palate, uvula, fauces visible)    Activity:  2 - Able to move 4 extremities voluntarily on command  Respiration:  2 - Able to breathe deeply and cough freely  Circulation:  2 - BP+/- 20mmHg of normal  Consciousness:  2 - Fully awake  Oxygen Saturation (color):  2 - Able to maintain oxygen saturation >92% on room air    Sedation : Moderate sedation planned    HPI / Treatment plan : tunneled central venous catheter placement

## 2022-05-31 ENCOUNTER — HOSPITAL ENCOUNTER (OUTPATIENT)
Dept: ONCOLOGY | Age: 52
Setting detail: INFUSION SERIES
Discharge: HOME | End: 2022-05-31
Payer: COMMERCIAL

## 2022-05-31 ENCOUNTER — TELEPHONE (OUTPATIENT)
Dept: ONCOLOGY | Age: 52
End: 2022-05-31

## 2022-05-31 PROCEDURE — 6360000002 HC RX W HCPCS: Performed by: INTERNAL MEDICINE

## 2022-05-31 PROCEDURE — 96372 THER/PROPH/DIAG INJ SC/IM: CPT

## 2022-05-31 RX ADMIN — PLERIXAFOR 24 MG: 24 SOLUTION SUBCUTANEOUS at 16:46

## 2022-05-31 NOTE — PROGRESS NOTES
Mozobil given per order. Pt took Immodium prior to injection. Pt tolerated well. Pt discharged to home.   Sonal Truong RN

## 2022-05-31 NOTE — TELEPHONE ENCOUNTER
Spoke w/patient to reschedule nutrition appointment for Weds 6/8/22 at 2pm.     Electronically signed by Snow Lozano RD, LD on 5/31/2022 at 11:10 AM

## 2022-06-01 ENCOUNTER — HOSPITAL ENCOUNTER (OUTPATIENT)
Dept: ONCOLOGY | Age: 52
Setting detail: INFUSION SERIES
Discharge: HOME | End: 2022-06-01
Payer: COMMERCIAL

## 2022-06-01 VITALS
TEMPERATURE: 98.1 F | DIASTOLIC BLOOD PRESSURE: 61 MMHG | HEART RATE: 82 BPM | OXYGEN SATURATION: 97 % | RESPIRATION RATE: 20 BRPM | SYSTOLIC BLOOD PRESSURE: 99 MMHG

## 2022-06-01 DIAGNOSIS — Z52.011 AUTOLOGOUS DONOR OF STEM CELLS: Primary | ICD-10-CM

## 2022-06-01 LAB
A/G RATIO: 1.8 (ref 1.1–2.2)
ALBUMIN SERPL-MCNC: 4.2 G/DL (ref 3.4–5)
ALP BLD-CCNC: 273 U/L (ref 40–129)
ALT SERPL-CCNC: 67 U/L (ref 10–40)
ANION GAP SERPL CALCULATED.3IONS-SCNC: 13 MMOL/L (ref 3–16)
ANISOCYTOSIS: ABNORMAL
ANISOCYTOSIS: ABNORMAL
AST SERPL-CCNC: 68 U/L (ref 15–37)
BANDED NEUTROPHILS RELATIVE PERCENT: 3 % (ref 0–7)
BASOPHILS ABSOLUTE: 0 K/UL (ref 0–0.2)
BASOPHILS ABSOLUTE: 0.4 K/UL (ref 0–0.2)
BASOPHILS RELATIVE PERCENT: 0 %
BASOPHILS RELATIVE PERCENT: 1 %
BILIRUB SERPL-MCNC: 0.3 MG/DL (ref 0–1)
BUN BLDV-MCNC: 3 MG/DL (ref 7–20)
CALCIUM SERPL-MCNC: 9.4 MG/DL (ref 8.3–10.6)
CHLORIDE BLD-SCNC: 104 MMOL/L (ref 99–110)
CO2: 24 MMOL/L (ref 21–32)
CREAT SERPL-MCNC: 0.6 MG/DL (ref 0.6–1.1)
EOSINOPHILS ABSOLUTE: 0.4 K/UL (ref 0–0.6)
EOSINOPHILS ABSOLUTE: 1.5 K/UL (ref 0–0.6)
EOSINOPHILS RELATIVE PERCENT: 1 %
EOSINOPHILS RELATIVE PERCENT: 5 %
GFR AFRICAN AMERICAN: >60
GFR NON-AFRICAN AMERICAN: >60
GLUCOSE BLD-MCNC: 112 MG/DL (ref 70–99)
HCT VFR BLD CALC: 30.8 % (ref 36–48)
HCT VFR BLD CALC: 33.6 % (ref 36–48)
HEMOGLOBIN: 10.1 G/DL (ref 12–16)
HEMOGLOBIN: 10.7 G/DL (ref 12–16)
LYMPHOCYTES ABSOLUTE: 0 K/UL (ref 1–5.1)
LYMPHOCYTES ABSOLUTE: 4.2 K/UL (ref 1–5.1)
LYMPHOCYTES RELATIVE PERCENT: 0 %
LYMPHOCYTES RELATIVE PERCENT: 11 %
MACROCYTES: ABNORMAL
MACROCYTES: ABNORMAL
MAGNESIUM: 1.5 MG/DL (ref 1.8–2.4)
MCH RBC QN AUTO: 33.1 PG (ref 26–34)
MCH RBC QN AUTO: 33.7 PG (ref 26–34)
MCHC RBC AUTO-ENTMCNC: 31.9 G/DL (ref 31–36)
MCHC RBC AUTO-ENTMCNC: 32.9 G/DL (ref 31–36)
MCV RBC AUTO: 102.5 FL (ref 80–100)
MCV RBC AUTO: 103.8 FL (ref 80–100)
METAMYELOCYTES RELATIVE PERCENT: 2 %
METAMYELOCYTES RELATIVE PERCENT: 7 %
MICROCYTES: ABNORMAL
MICROCYTES: ABNORMAL
MONOCYTES ABSOLUTE: 1.2 K/UL (ref 0–1.3)
MONOCYTES ABSOLUTE: 2.3 K/UL (ref 0–1.3)
MONOCYTES RELATIVE PERCENT: 4 %
MONOCYTES RELATIVE PERCENT: 6 %
MYELOCYTE PERCENT: 1 %
NEUTROPHILS ABSOLUTE: 26.8 K/UL (ref 1.7–7.7)
NEUTROPHILS ABSOLUTE: 31.3 K/UL (ref 1.7–7.7)
NEUTROPHILS RELATIVE PERCENT: 79 %
NEUTROPHILS RELATIVE PERCENT: 80 %
PDW BLD-RTO: 16 % (ref 12.4–15.4)
PDW BLD-RTO: 16.1 % (ref 12.4–15.4)
PLATELET # BLD: 207 K/UL (ref 135–450)
PLATELET # BLD: 74 K/UL (ref 135–450)
PLATELET SLIDE REVIEW: ABNORMAL
PMV BLD AUTO: 7.2 FL (ref 5–10.5)
PMV BLD AUTO: 8.5 FL (ref 5–10.5)
POTASSIUM SERPL-SCNC: 3.2 MMOL/L (ref 3.5–5.1)
RBC # BLD: 3 M/UL (ref 4–5.2)
RBC # BLD: 3.24 M/UL (ref 4–5.2)
SLIDE REVIEW: ABNORMAL
SODIUM BLD-SCNC: 141 MMOL/L (ref 136–145)
STOMATOCYTES: ABNORMAL
TOTAL PROTEIN: 6.6 G/DL (ref 6.4–8.2)
WBC # BLD: 29.4 K/UL (ref 4–11)
WBC # BLD: 38.6 K/UL (ref 4–11)

## 2022-06-01 PROCEDURE — 96372 THER/PROPH/DIAG INJ SC/IM: CPT

## 2022-06-01 PROCEDURE — 83735 ASSAY OF MAGNESIUM: CPT

## 2022-06-01 PROCEDURE — 96365 THER/PROPH/DIAG IV INF INIT: CPT

## 2022-06-01 PROCEDURE — 85025 COMPLETE CBC W/AUTO DIFF WBC: CPT

## 2022-06-01 PROCEDURE — 6360000002 HC RX W HCPCS: Performed by: INTERNAL MEDICINE

## 2022-06-01 PROCEDURE — 80053 COMPREHEN METABOLIC PANEL: CPT

## 2022-06-01 PROCEDURE — 36592 COLLECT BLOOD FROM PICC: CPT

## 2022-06-01 PROCEDURE — 38207 CRYOPRESERVE STEM CELLS: CPT

## 2022-06-01 PROCEDURE — 96366 THER/PROPH/DIAG IV INF ADDON: CPT

## 2022-06-01 PROCEDURE — 38206 HARVEST AUTO STEM CELLS: CPT

## 2022-06-01 RX ORDER — LOPERAMIDE HYDROCHLORIDE 2 MG/1
2 CAPSULE ORAL PRN
Status: DISCONTINUED | OUTPATIENT
Start: 2022-06-01 | End: 2022-06-01 | Stop reason: CLARIF

## 2022-06-01 RX ORDER — LOPERAMIDE HYDROCHLORIDE 2 MG/1
2 CAPSULE ORAL PRN
OUTPATIENT
Start: 2022-06-02

## 2022-06-01 RX ORDER — POTASSIUM CHLORIDE 20 MEQ/1
20 TABLET, EXTENDED RELEASE ORAL ONCE
Status: COMPLETED | OUTPATIENT
Start: 2022-06-01 | End: 2022-06-01

## 2022-06-01 RX ORDER — PROCHLORPERAZINE MALEATE 10 MG
10 TABLET ORAL EVERY 6 HOURS PRN
Status: DISCONTINUED | OUTPATIENT
Start: 2022-06-01 | End: 2022-06-01 | Stop reason: CLARIF

## 2022-06-01 RX ORDER — CALCIUM GLUCONATE 20 MG/ML
2000 INJECTION, SOLUTION INTRAVENOUS
Status: COMPLETED | OUTPATIENT
Start: 2022-06-01 | End: 2022-06-01

## 2022-06-01 RX ORDER — PROCHLORPERAZINE MALEATE 10 MG
10 TABLET ORAL EVERY 6 HOURS PRN
OUTPATIENT
Start: 2022-06-02

## 2022-06-01 RX ADMIN — POTASSIUM CHLORIDE 20 MEQ: 20 TABLET, EXTENDED RELEASE ORAL at 12:49

## 2022-06-01 RX ADMIN — CALCIUM GLUCONATE 2000 MG: 20 INJECTION, SOLUTION INTRAVENOUS at 10:00

## 2022-06-01 RX ADMIN — CALCIUM GLUCONATE 2000 MG: 20 INJECTION, SOLUTION INTRAVENOUS at 08:00

## 2022-06-01 NOTE — PROGRESS NOTES
Pt arrived to [facility] today for scheduled stem cell pheresis procedure. Granix and blood draw complete per this RN. Apheresis, line care, education, & lab draws all completed by Woo RN, Oli Mirza. PO potassium administered for a potassium level of 3.2. Educated patient of potassium rich foods. Review Woo documentation for details.

## 2022-06-01 NOTE — ONCOLOGY
Transplant calendar reviewed with patient. Verbalized understanding that [facility] will notify patient this evening with todays collection results. Aware goal is 2.5x10^6cd34/kg. Patient to return tomorrow to [place] [address] for line care regardless is meets goal today. Will return on 6/8/22 for Pre-admit visit and line care at [facility].

## 2022-06-01 NOTE — PROGRESS NOTES
PATIENT:  Sam La    :  1970    DIAGNOSIS:  Large Cell NHL    PROCEDURE: Autologous stem cell collection in anticipation of autologous  stem cell transplant. SUBJECTIVE:  Patient is without complaints this morning. Did have diarrhea after Mozobil last night, but that resolved with Immodium x 2  . REVIEW OF SYSTEMS:  The remainder of her 10-point is negative. PHYSICAL EXAMINATION:   GENERAL APPEARANCE: He is alert and oriented in no distress. VITAL SIGNS: Within normal limits. HEENT: Pupils are equal and reactive. RESPIRATORY: Clear to auscultation bilaterally. CARDIOVASCULAR: Regular rate and rhythm. No rubs or murmurs. ABDOMEN: Nontender. EXTREMITIES: No edema. SKIN: No rash or dermatitis. LABORATORIES: Reviewed. DESCRIPTION OF PROCEDURE: Patient underwent a 25 liter collection at a flow rate of 100 mL per minute. The procedure was tolerated well. ASSESSMENT AND PLAN: Day # 1  of stem cell collection. Goal is 2.5-5.0 CD-34 cells/kg. Collection will be done today and possibly tomorrow depending on CD-34 count from today.    =====================    Anthony Villarreal.  Shaista Leon, [address] [place] [address]

## 2022-06-02 ENCOUNTER — HOSPITAL ENCOUNTER (OUTPATIENT)
Dept: ONCOLOGY | Age: 52
Setting detail: INFUSION SERIES
Discharge: HOME | End: 2022-06-02
Payer: COMMERCIAL

## 2022-06-02 DIAGNOSIS — Z52.011 AUTOLOGOUS DONOR OF STEM CELLS: Primary | ICD-10-CM

## 2022-06-02 PROCEDURE — 6360000002 HC RX W HCPCS: Performed by: NURSE PRACTITIONER

## 2022-06-02 PROCEDURE — 2580000003 HC RX 258: Performed by: NURSE PRACTITIONER

## 2022-06-02 PROCEDURE — 99211 OFF/OP EST MAY X REQ PHY/QHP: CPT

## 2022-06-02 RX ORDER — HEPARIN SODIUM (PORCINE) LOCK FLUSH IV SOLN 100 UNIT/ML 100 UNIT/ML
500 SOLUTION INTRAVENOUS PRN
Status: DISCONTINUED | OUTPATIENT
Start: 2022-06-02 | End: 2022-06-03 | Stop reason: HOSPADM

## 2022-06-02 RX ORDER — HEPARIN SODIUM (PORCINE) LOCK FLUSH IV SOLN 100 UNIT/ML 100 UNIT/ML
500 SOLUTION INTRAVENOUS PRN
OUTPATIENT
Start: 2022-06-02

## 2022-06-02 RX ORDER — SODIUM CHLORIDE 0.9 % (FLUSH) 0.9 %
5-40 SYRINGE (ML) INJECTION PRN
OUTPATIENT
Start: 2022-06-02

## 2022-06-02 RX ORDER — SODIUM CHLORIDE 0.9 % (FLUSH) 0.9 %
5-40 SYRINGE (ML) INJECTION PRN
Status: DISCONTINUED | OUTPATIENT
Start: 2022-06-02 | End: 2022-06-03 | Stop reason: HOSPADM

## 2022-06-02 RX ORDER — SODIUM CHLORIDE 9 MG/ML
25 INJECTION, SOLUTION INTRAVENOUS PRN
OUTPATIENT
Start: 2022-06-02

## 2022-06-02 RX ORDER — HEPARIN SODIUM (PORCINE) LOCK FLUSH IV SOLN 100 UNIT/ML 100 UNIT/ML
1500 SOLUTION INTRAVENOUS PRN
Status: CANCELLED | OUTPATIENT
Start: 2022-06-02

## 2022-06-02 RX ADMIN — SODIUM CHLORIDE, PRESERVATIVE FREE 500 UNITS: 5 INJECTION INTRAVENOUS at 09:47

## 2022-06-02 RX ADMIN — SODIUM CHLORIDE, PRESERVATIVE FREE 500 UNITS: 5 INJECTION INTRAVENOUS at 09:48

## 2022-06-02 RX ADMIN — SODIUM CHLORIDE, PRESERVATIVE FREE 30 ML: 5 INJECTION INTRAVENOUS at 09:49

## 2022-06-02 RX ADMIN — SODIUM CHLORIDE, PRESERVATIVE FREE 500 UNITS: 5 INJECTION INTRAVENOUS at 09:46

## 2022-06-02 NOTE — PROGRESS NOTES
Pt seen and assessed at [facility] for line care needs. CVC site remains free of signs/symptoms of infection. No drainage, edema, erythema, pain, or warmth noted at site. Line care performed per flowsheet. CVC need continues d/t ongoing outpt therapy. Pt verbalizes understanding of discharge instructions. Discharged ambulatory to home with self.

## 2022-06-08 ENCOUNTER — HOSPITAL ENCOUNTER (OUTPATIENT)
Dept: ONCOLOGY | Age: 52
Setting detail: INFUSION SERIES
Discharge: HOME | End: 2022-06-08
Payer: COMMERCIAL

## 2022-06-08 VITALS — HEIGHT: 61 IN | WEIGHT: 172.8 LBS | BODY MASS INDEX: 32.62 KG/M2

## 2022-06-08 PROCEDURE — 97802 MEDICAL NUTRITION INDIV IN: CPT

## 2022-06-08 PROCEDURE — 97803 MED NUTRITION INDIV SUBSEQ: CPT

## 2022-06-08 NOTE — PROGRESS NOTES
Nutrition History and Dietary Recall     Food / Nutrition-Related History  Food Allergies or Intolerances: none   Vitamins, Minerals, and other Herbal Supplements: MV daily; Food Restrictions / Cultural Requests:    none   Followed diet for medical reasons: yes ; low sodium diet   Made dietary/lifestyle changes in past: no  Food preferences: no     Current Nutrition:   PO Diet: Regular   Oral Nutrition Supplements: Standard High Calorie Boost; used during first round of chemo; used BID;   PO Intake: 26-50% does not typically eat   PO Supplement Intake: does not currently use. Additional Sources of Calories/IVF: no     24 hour recall/food frequency:  **Reports has always been a \"1 meal a day\" person; usually dinner**    Breakfast: no.  Fluids: soda-12 oz can; 4-5 oz coffee     AM Snack: yes   Consumes: bananas, zebra cake (lil jessica); yogurt (1 per day)- lowfat     Dinner: yes   Consumes: 7 out of 7 days  Current meal includes: cheeseburger & fries; fast food; fish sandwich w/cheese and salad;   PO Intake: %  Fluids: 12- oz soda, or juice;      HS Snack: no    Additional Fluids/Drinks: yes; mt dew; pepsi; juice (cranapple; apple)   1- 6 oz 2% milk   Drinks 3-12 oz sodas  Juice 16 oz daily; Water- 16 oz bottle   Do you drink alcohol? no.     Additional Nutrition-related Rx: no    Food & Meal Prep:   Responsible party for grocery shopping/meal planning: self;   Patient/Spouse/Family prepare meals at home 3 times per week. Patient dines out to a sit down restaurant 3 times per week. Patient dines out to a fast food restaurant 0 times per. Food & Meal-time Behaviors:   Patient does not have meal/snack pattern    Patient does skip meals > 4 days per week. Patient does have grazing. Patient does not have night eating. Patient does have a history of emotional impact to pt eating. **Pt reports in 2000 her weight went down to 117 lb from anxiety/depression where she was not eating.  States she is

## 2022-06-08 NOTE — CONSULTS
Oncology Nutrition Assessment    RECOMMENDATIONS:  1. PO Diet: Continue current diet; Low Microbial diet w/BMT. Meal Pattern: Pt regular dietary pattern is 1 meal per day. Pt needs to incorporate regular meal patterns into daily schedule to ensure consistent PO nutrition through post-BMT. RD encouraged food options to work towards this. 2. ONS: Start ONS/Boost (at home) daily now. Consider using BID if pt struggling to incorporate regular meals. 3. Nutrition Education: Food Safety and Nutrition after BMT pamphlet provided and verbally reviewed. NUTRITION ASSESSMENT:   Nutritional summary & status:   Pt seen for pre-BMT nutrition evaluation. Pt w/risk of nutrition compromise r/t her regular eating pattern is typically 1 meal per day (dinner) and pt not eating regularly at other times during the day. Also noted per diet recall, pt consumes very little water (~16 oz per day); drinks mostly soda or juice.  She does \"snack\" at times, but has no set pattern to her meals, which could in turn be a risk for malnutrition   · Patient completed PG-SGA nutrition screen as part of patient's pre-tx nutrition eval. PG-SGA: Score 2-3; Patient and family education by dietitians, RN, or other clinician, with pharmacological intervention as indicated   Admission/PMH:      MALNUTRITION ASSESSMENT         Findings of the 6 clinical characteristics of malnutrition (Minimum of 2 out of 6 clinical characteristics is required to make the diagnosis of moderate or severe Protein Calorie Malnutrition based on AND/ASPEN Guidelines):  Energy Intake: Less than/equal to 75% of estimated energy requirements    Energy Intake Time: Greater than or equal to 1 year     NUTRITION DIAGNOSIS     related to   as evidenced by      [address] and/or Nutrient Delivery:     Nutrition Education/Counseling:      Goals:            Nutrition Monitoring and Evaluation:   Food/Nutrient Intake Outcomes:     Physical Signs/Symptoms Outcomes: OBJECTIVE DATA: Significant to nutrition assessment  · Nutrition-Focused Physical Findings:    · Labs: Reviewed; ***  · Meds: Reviewed; ***  · Wounds:         CURRENT NUTRITION THERAPIES  Regular diet   PO Intake:     PO Supplement Intake: Additional Sources of Calories/IVF:      ANTHROPOMETRICS  Current Height: 5' 1\" (154.9 cm)  Current Weight: 172 lb 12.8 oz (78.4 kg)    Admission weight: 172 lb 12.8 oz (78.4 kg)  Ideal Body Weight (IBW): 105 lbs  (48 kg)    Usual Bodyweight   135 lb   Weight Changes pt reports weight gain d/t steroids through       BMI: 32.7    Wt Readings from Last 50 Encounters:   06/08/22 172 lb 12.8 oz (78.4 kg)   05/27/22 170 lb (77.1 kg)   05/18/22 171 lb 12.8 oz (77.9 kg)   05/09/22 173 lb 8 oz (78.7 kg)       COMPARATIVE STANDARDS  Energy (kcal):        Protein (g):          Fluid (ml/day):       Consult dietitian if nutrition interventions essential to patient care is needed.      Milla Perez, RD, LD

## 2022-06-09 ENCOUNTER — HOSPITAL ENCOUNTER (INPATIENT)
Age: 52
LOS: 18 days | Discharge: HOME | DRG: 016 | End: 2022-06-27
Attending: INTERNAL MEDICINE | Admitting: INTERNAL MEDICINE
Payer: COMMERCIAL

## 2022-06-09 ENCOUNTER — APPOINTMENT (OUTPATIENT)
Dept: GENERAL RADIOLOGY | Age: 52
DRG: 016 | End: 2022-06-09
Attending: INTERNAL MEDICINE
Payer: COMMERCIAL

## 2022-06-09 PROBLEM — C83.30 DIFFUSE LARGE B CELL LYMPHOMA (HCC): Status: ACTIVE | Noted: 2022-06-09

## 2022-06-09 LAB
ABO/RH: NORMAL
ALBUMIN SERPL-MCNC: 4.2 G/DL (ref 3.4–5)
ALP BLD-CCNC: 201 U/L (ref 40–129)
ALT SERPL-CCNC: 81 U/L (ref 10–40)
ANION GAP SERPL CALCULATED.3IONS-SCNC: 11 MMOL/L (ref 3–16)
ANTIBODY SCREEN: NORMAL
APTT: 27.4 SEC (ref 23–34.3)
AST SERPL-CCNC: 64 U/L (ref 15–37)
BASOPHILS ABSOLUTE: 0 K/UL (ref 0–0.2)
BASOPHILS RELATIVE PERCENT: 0.3 %
BILIRUB SERPL-MCNC: 0.5 MG/DL (ref 0–1)
BILIRUBIN DIRECT: <0.2 MG/DL (ref 0–0.3)
BILIRUBIN, INDIRECT: ABNORMAL MG/DL (ref 0–1)
BUN BLDV-MCNC: 11 MG/DL (ref 7–20)
CALCIUM SERPL-MCNC: 9.6 MG/DL (ref 8.3–10.6)
CHLORIDE BLD-SCNC: 102 MMOL/L (ref 99–110)
CO2: 27 MMOL/L (ref 21–32)
CREAT SERPL-MCNC: 0.6 MG/DL (ref 0.6–1.1)
EOSINOPHILS ABSOLUTE: 0.1 K/UL (ref 0–0.6)
EOSINOPHILS RELATIVE PERCENT: 3.8 %
GFR AFRICAN AMERICAN: >60
GFR NON-AFRICAN AMERICAN: >60
GLUCOSE BLD-MCNC: 108 MG/DL (ref 70–99)
HCT VFR BLD CALC: 30.4 % (ref 36–48)
HEMOGLOBIN: 10.5 G/DL (ref 12–16)
INR BLD: 1.02 (ref 0.87–1.14)
LACTATE DEHYDROGENASE: 203 U/L (ref 100–190)
LYMPHOCYTES ABSOLUTE: 0.5 K/UL (ref 1–5.1)
LYMPHOCYTES RELATIVE PERCENT: 18.8 %
MAGNESIUM: 2 MG/DL (ref 1.8–2.4)
MCH RBC QN AUTO: 34.4 PG (ref 26–34)
MCHC RBC AUTO-ENTMCNC: 34.5 G/DL (ref 31–36)
MCV RBC AUTO: 99.7 FL (ref 80–100)
MONOCYTES ABSOLUTE: 0.2 K/UL (ref 0–1.3)
MONOCYTES RELATIVE PERCENT: 9.6 %
NEUTROPHILS ABSOLUTE: 1.7 K/UL (ref 1.7–7.7)
NEUTROPHILS RELATIVE PERCENT: 67.5 %
PDW BLD-RTO: 14.9 % (ref 12.4–15.4)
PHOSPHORUS: 4 MG/DL (ref 2.5–4.9)
PLATELET # BLD: 154 K/UL (ref 135–450)
PMV BLD AUTO: 8.6 FL (ref 5–10.5)
POTASSIUM SERPL-SCNC: 3.8 MMOL/L (ref 3.5–5.1)
PROTHROMBIN TIME: 13.3 SEC (ref 11.7–14.5)
RBC # BLD: 3.04 M/UL (ref 4–5.2)
SODIUM BLD-SCNC: 140 MMOL/L (ref 136–145)
TOTAL PROTEIN: 6.2 G/DL (ref 6.4–8.2)
URIC ACID, SERUM: 4.9 MG/DL (ref 2.6–6)
WBC # BLD: 2.6 K/UL (ref 4–11)

## 2022-06-09 PROCEDURE — 6370000000 HC RX 637 (ALT 250 FOR IP): Performed by: STUDENT IN AN ORGANIZED HEALTH CARE EDUCATION/TRAINING PROGRAM

## 2022-06-09 PROCEDURE — 86901 BLOOD TYPING SEROLOGIC RH(D): CPT

## 2022-06-09 PROCEDURE — 86900 BLOOD TYPING SEROLOGIC ABO: CPT

## 2022-06-09 PROCEDURE — 6360000002 HC RX W HCPCS: Performed by: INTERNAL MEDICINE

## 2022-06-09 PROCEDURE — 85025 COMPLETE CBC W/AUTO DIFF WBC: CPT

## 2022-06-09 PROCEDURE — 84550 ASSAY OF BLOOD/URIC ACID: CPT

## 2022-06-09 PROCEDURE — 83735 ASSAY OF MAGNESIUM: CPT

## 2022-06-09 PROCEDURE — 80048 BASIC METABOLIC PNL TOTAL CA: CPT

## 2022-06-09 PROCEDURE — 6370000000 HC RX 637 (ALT 250 FOR IP): Performed by: INTERNAL MEDICINE

## 2022-06-09 PROCEDURE — 80076 HEPATIC FUNCTION PANEL: CPT

## 2022-06-09 PROCEDURE — 96415 CHEMO IV INFUSION ADDL HR: CPT

## 2022-06-09 PROCEDURE — 94150 VITAL CAPACITY TEST: CPT

## 2022-06-09 PROCEDURE — 6370000000 HC RX 637 (ALT 250 FOR IP): Performed by: NURSE PRACTITIONER

## 2022-06-09 PROCEDURE — 71046 X-RAY EXAM CHEST 2 VIEWS: CPT

## 2022-06-09 PROCEDURE — 96413 CHEMO IV INFUSION 1 HR: CPT

## 2022-06-09 PROCEDURE — 85730 THROMBOPLASTIN TIME PARTIAL: CPT

## 2022-06-09 PROCEDURE — 2060000000 HC ICU INTERMEDIATE R&B

## 2022-06-09 PROCEDURE — 85610 PROTHROMBIN TIME: CPT

## 2022-06-09 PROCEDURE — 84100 ASSAY OF PHOSPHORUS: CPT

## 2022-06-09 PROCEDURE — 94760 N-INVAS EAR/PLS OXIMETRY 1: CPT

## 2022-06-09 PROCEDURE — 2580000003 HC RX 258: Performed by: NURSE PRACTITIONER

## 2022-06-09 PROCEDURE — 2580000003 HC RX 258: Performed by: INTERNAL MEDICINE

## 2022-06-09 PROCEDURE — 93005 ELECTROCARDIOGRAM TRACING: CPT | Performed by: NURSE PRACTITIONER

## 2022-06-09 PROCEDURE — 3E04305 INTRODUCTION OF OTHER ANTINEOPLASTIC INTO CENTRAL VEIN, PERCUTANEOUS APPROACH: ICD-10-PCS | Performed by: INTERNAL MEDICINE

## 2022-06-09 PROCEDURE — 86850 RBC ANTIBODY SCREEN: CPT

## 2022-06-09 PROCEDURE — 83615 LACTATE (LD) (LDH) ENZYME: CPT

## 2022-06-09 PROCEDURE — 6360000002 HC RX W HCPCS: Performed by: NURSE PRACTITIONER

## 2022-06-09 RX ORDER — ACETAMINOPHEN 325 MG/1
325 TABLET ORAL ONCE
Status: COMPLETED | OUTPATIENT
Start: 2022-06-09 | End: 2022-06-09

## 2022-06-09 RX ORDER — ONDANSETRON HYDROCHLORIDE 8 MG/1
24 TABLET, FILM COATED ORAL ONCE
Status: COMPLETED | OUTPATIENT
Start: 2022-06-14 | End: 2022-06-14

## 2022-06-09 RX ORDER — POTASSIUM CHLORIDE 29.8 MG/ML
20 INJECTION INTRAVENOUS PRN
Status: DISCONTINUED | OUTPATIENT
Start: 2022-06-09 | End: 2022-06-27 | Stop reason: HOSPADM

## 2022-06-09 RX ORDER — SODIUM CHLORIDE 0.9 % (FLUSH) 0.9 %
5-40 SYRINGE (ML) INJECTION EVERY 12 HOURS SCHEDULED
Status: DISCONTINUED | OUTPATIENT
Start: 2022-06-09 | End: 2022-06-27 | Stop reason: HOSPADM

## 2022-06-09 RX ORDER — VALACYCLOVIR HYDROCHLORIDE 500 MG/1
500 TABLET, FILM COATED ORAL 2 TIMES DAILY
Status: DISCONTINUED | OUTPATIENT
Start: 2022-06-09 | End: 2022-06-21

## 2022-06-09 RX ORDER — HYDROXYZINE PAMOATE 25 MG/1
25 CAPSULE ORAL 2 TIMES DAILY PRN
Status: DISCONTINUED | OUTPATIENT
Start: 2022-06-09 | End: 2022-06-27 | Stop reason: HOSPADM

## 2022-06-09 RX ORDER — SODIUM CHLORIDE 0.9 % (FLUSH) 0.9 %
5-40 SYRINGE (ML) INJECTION PRN
Status: DISCONTINUED | OUTPATIENT
Start: 2022-06-09 | End: 2022-06-27 | Stop reason: HOSPADM

## 2022-06-09 RX ORDER — FUROSEMIDE 10 MG/ML
40 INJECTION INTRAMUSCULAR; INTRAVENOUS EVERY 12 HOURS
Status: DISCONTINUED | OUTPATIENT
Start: 2022-06-10 | End: 2022-06-18

## 2022-06-09 RX ORDER — ALLOPURINOL 300 MG/1
300 TABLET ORAL DAILY
Status: COMPLETED | OUTPATIENT
Start: 2022-06-09 | End: 2022-06-14

## 2022-06-09 RX ORDER — PROCHLORPERAZINE MALEATE 10 MG
10 TABLET ORAL EVERY 4 HOURS PRN
Status: DISCONTINUED | OUTPATIENT
Start: 2022-06-09 | End: 2022-06-27 | Stop reason: HOSPADM

## 2022-06-09 RX ORDER — M-VIT,TX,IRON,MINS/CALC/FOLIC 27MG-0.4MG
1 TABLET ORAL DAILY
COMMUNITY

## 2022-06-09 RX ORDER — METOPROLOL SUCCINATE 25 MG/1
25 TABLET, EXTENDED RELEASE ORAL DAILY
Status: DISCONTINUED | OUTPATIENT
Start: 2022-06-09 | End: 2022-06-18

## 2022-06-09 RX ORDER — PROCHLORPERAZINE EDISYLATE 5 MG/ML
10 INJECTION INTRAMUSCULAR; INTRAVENOUS EVERY 4 HOURS PRN
Status: DISCONTINUED | OUTPATIENT
Start: 2022-06-09 | End: 2022-06-26

## 2022-06-09 RX ORDER — DEXTROSE AND SODIUM CHLORIDE 5; .45 G/100ML; G/100ML
INJECTION, SOLUTION INTRAVENOUS CONTINUOUS
Status: ACTIVE | OUTPATIENT
Start: 2022-06-09 | End: 2022-06-09

## 2022-06-09 RX ORDER — DEXAMETHASONE 4 MG/1
20 TABLET ORAL ONCE
Status: COMPLETED | OUTPATIENT
Start: 2022-06-14 | End: 2022-06-14

## 2022-06-09 RX ORDER — DIPHENHYDRAMINE HCL 25 MG
25 TABLET ORAL NIGHTLY PRN
Status: DISCONTINUED | OUTPATIENT
Start: 2022-06-09 | End: 2022-06-27 | Stop reason: HOSPADM

## 2022-06-09 RX ORDER — DIPHENHYDRAMINE HCL 25 MG
25 TABLET ORAL EVERY 6 HOURS PRN
Status: DISCONTINUED | OUTPATIENT
Start: 2022-06-09 | End: 2022-06-26

## 2022-06-09 RX ORDER — CITALOPRAM 20 MG/1
20 TABLET ORAL DAILY
Status: DISCONTINUED | OUTPATIENT
Start: 2022-06-09 | End: 2022-06-27 | Stop reason: HOSPADM

## 2022-06-09 RX ORDER — FLUCONAZOLE 200 MG/1
400 TABLET ORAL DAILY
Status: DISCONTINUED | OUTPATIENT
Start: 2022-06-15 | End: 2022-06-21

## 2022-06-09 RX ORDER — DEXAMETHASONE 4 MG/1
20 TABLET ORAL ONCE
Status: COMPLETED | OUTPATIENT
Start: 2022-06-09 | End: 2022-06-09

## 2022-06-09 RX ORDER — BUSPIRONE HYDROCHLORIDE 5 MG/1
15 TABLET ORAL 2 TIMES DAILY PRN
Status: DISCONTINUED | OUTPATIENT
Start: 2022-06-09 | End: 2022-06-27 | Stop reason: HOSPADM

## 2022-06-09 RX ORDER — ASPIRIN 81 MG/1
81 TABLET ORAL DAILY
Status: DISCONTINUED | OUTPATIENT
Start: 2022-06-09 | End: 2022-06-18

## 2022-06-09 RX ORDER — DEXAMETHASONE 4 MG/1
10 TABLET ORAL 2 TIMES DAILY
Status: COMPLETED | OUTPATIENT
Start: 2022-06-10 | End: 2022-06-13

## 2022-06-09 RX ORDER — FERROUS SULFATE 7.5 MG/0.5
65 SYRINGE (EA) ORAL DAILY
Status: ON HOLD | COMMUNITY
End: 2022-06-27 | Stop reason: HOSPADM

## 2022-06-09 RX ORDER — LORAZEPAM 0.5 MG/1
0.5 TABLET ORAL EVERY 4 HOURS PRN
Status: DISCONTINUED | OUTPATIENT
Start: 2022-06-09 | End: 2022-06-27 | Stop reason: HOSPADM

## 2022-06-09 RX ORDER — SODIUM CHLORIDE 9 MG/ML
INJECTION, SOLUTION INTRAVENOUS CONTINUOUS PRN
Status: DISCONTINUED | OUTPATIENT
Start: 2022-06-09 | End: 2022-06-27 | Stop reason: HOSPADM

## 2022-06-09 RX ORDER — ONDANSETRON HYDROCHLORIDE 8 MG/1
24 TABLET, FILM COATED ORAL ONCE
Status: COMPLETED | OUTPATIENT
Start: 2022-06-09 | End: 2022-06-09

## 2022-06-09 RX ORDER — ENOXAPARIN SODIUM 100 MG/ML
40 INJECTION SUBCUTANEOUS DAILY
Status: DISCONTINUED | OUTPATIENT
Start: 2022-06-09 | End: 2022-06-19

## 2022-06-09 RX ORDER — M-VIT,TX,IRON,MINS/CALC/FOLIC 27MG-0.4MG
1 TABLET ORAL DAILY
Status: DISCONTINUED | OUTPATIENT
Start: 2022-06-09 | End: 2022-06-21

## 2022-06-09 RX ORDER — DEXTROSE AND SODIUM CHLORIDE 5; .45 G/100ML; G/100ML
INJECTION, SOLUTION INTRAVENOUS CONTINUOUS
Status: DISCONTINUED | OUTPATIENT
Start: 2022-06-09 | End: 2022-06-20

## 2022-06-09 RX ORDER — MAGNESIUM SULFATE IN WATER 40 MG/ML
4000 INJECTION, SOLUTION INTRAVENOUS PRN
Status: DISCONTINUED | OUTPATIENT
Start: 2022-06-09 | End: 2022-06-27 | Stop reason: HOSPADM

## 2022-06-09 RX ORDER — LORAZEPAM 2 MG/ML
0.5 INJECTION INTRAMUSCULAR EVERY 4 HOURS PRN
Status: DISCONTINUED | OUTPATIENT
Start: 2022-06-09 | End: 2022-06-26

## 2022-06-09 RX ORDER — SODIUM CHLORIDE 9 MG/ML
25 INJECTION, SOLUTION INTRAVENOUS PRN
Status: DISCONTINUED | OUTPATIENT
Start: 2022-06-09 | End: 2022-06-27 | Stop reason: HOSPADM

## 2022-06-09 RX ADMIN — DEXTROSE AND SODIUM CHLORIDE: 5; 450 INJECTION, SOLUTION INTRAVENOUS at 12:52

## 2022-06-09 RX ADMIN — ENOXAPARIN SODIUM 40 MG: 100 INJECTION SUBCUTANEOUS at 17:26

## 2022-06-09 RX ADMIN — ALLOPURINOL 300 MG: 300 TABLET ORAL at 12:52

## 2022-06-09 RX ADMIN — VALACYCLOVIR HYDROCHLORIDE 500 MG: 500 TABLET, FILM COATED ORAL at 12:52

## 2022-06-09 RX ADMIN — VALACYCLOVIR HYDROCHLORIDE 500 MG: 500 TABLET, FILM COATED ORAL at 19:56

## 2022-06-09 RX ADMIN — SODIUM CHLORIDE, PRESERVATIVE FREE 10 ML: 5 INJECTION INTRAVENOUS at 12:52

## 2022-06-09 RX ADMIN — BUSPIRONE HYDROCHLORIDE 15 MG: 5 TABLET ORAL at 21:24

## 2022-06-09 RX ADMIN — DEXAMETHASONE 20 MG: 4 TABLET ORAL at 17:25

## 2022-06-09 RX ADMIN — DIPHENHYDRAMINE HYDROCHLORIDE 25 MG: 25 TABLET ORAL at 21:24

## 2022-06-09 RX ADMIN — CITALOPRAM HYDROBROMIDE 20 MG: 20 TABLET ORAL at 19:56

## 2022-06-09 RX ADMIN — SODIUM CHLORIDE, PRESERVATIVE FREE 10 ML: 5 INJECTION INTRAVENOUS at 19:56

## 2022-06-09 RX ADMIN — ONDANSETRON HYDROCHLORIDE 24 MG: 8 TABLET, FILM COATED ORAL at 17:26

## 2022-06-09 RX ADMIN — ACETAMINOPHEN 325 MG: 325 TABLET ORAL at 21:24

## 2022-06-09 RX ADMIN — METOPROLOL SUCCINATE 25 MG: 25 TABLET, EXTENDED RELEASE ORAL at 19:56

## 2022-06-09 RX ADMIN — CARMUSTINE 500 MG: KIT at 18:02

## 2022-06-09 RX ADMIN — ASPIRIN 81 MG: 81 TABLET, COATED ORAL at 19:56

## 2022-06-09 RX ADMIN — DEXTROSE AND SODIUM CHLORIDE: 5; 450 INJECTION, SOLUTION INTRAVENOUS at 16:08

## 2022-06-09 ASSESSMENT — PAIN DESCRIPTION - ONSET: ONSET: GRADUAL

## 2022-06-09 ASSESSMENT — PAIN DESCRIPTION - FREQUENCY: FREQUENCY: CONTINUOUS

## 2022-06-09 ASSESSMENT — PAIN SCALES - GENERAL
PAINLEVEL_OUTOF10: 3
PAINLEVEL_OUTOF10: 0
PAINLEVEL_OUTOF10: 3

## 2022-06-09 ASSESSMENT — PAIN - FUNCTIONAL ASSESSMENT: PAIN_FUNCTIONAL_ASSESSMENT: ACTIVITIES ARE NOT PREVENTED

## 2022-06-09 ASSESSMENT — PAIN DESCRIPTION - LOCATION: LOCATION: HEAD

## 2022-06-09 ASSESSMENT — PAIN DESCRIPTION - PAIN TYPE: TYPE: ACUTE PAIN

## 2022-06-09 ASSESSMENT — PAIN DESCRIPTION - DESCRIPTORS: DESCRIPTORS: DULL;ACHING

## 2022-06-09 ASSESSMENT — PAIN DESCRIPTION - ORIENTATION: ORIENTATION: MID

## 2022-06-09 NOTE — H&P
[facility] History and Physical        Attending Physician: Mahnaz Shay MD    Primary Care: Kev Harrell MD       Referring MD: No referring provider defined for this encounter. Name: Frida Brown :  1970  MRN:  [id number]    Admission: 2022      Date: 2022    Reason for Admission: BEAM preparative regimen followed by Autologous Stem Cell Transplant for R/R Diffuse Large B-Cell NHL    History of Present Illness:   Frida Brown is a 46 yo patient with h/o relapsed DLBC NHL. Her oncologic history dates back to 2020 when she first noted a mass on the left side of her neck. Biopsy was eventually obtained and revealed DLBC NHL, non-germinal center, FISH negative for BCL2, BCL6, or cMYC rearrangements. PET/CT revealed extensive disease. With these findings she was started on systemic tx R-CHOP 21. [address] was added with cycle #2. Interim PET/CT after 2 cycles showed near complete radiographic response and she continued tx for a total of 6 cycles ending in 2021. EOT PET/CT 21 showed persistent subcentimeter precarinal node with SUV of 4.2 but was otherwise normal. Decision was made to monitor and unfortunately PET/CT 2022 was significant for new bilateral supraclavicular LAD, right retroperitoneal LAD, and development of several other hypermetabolic areas all c/w relapsed disease. Core biopsy from right cervical LN 3/3/22 confirmed recurrent DLBC NHL. With these findings she was then started on R-ICE salvage chemotherapy 3/28/22 and has completed 2 cycles. Restaging performed 22 by PET/CT & BM Bx demonstrated CR. She is now being admitted for consolidative tx with BEAM chemotherapy & autologous SCT. Her PMH is otherwise significant for CAD, HLD, Chronic HFrEF, & anxiety. On admit she is feeling overall well with no acute complaints or concerns. She denies n/v/d/constipation. Denies SOB/BURNETT/Chest pain. Denies fevers/chills/night sweats.  Plan will be Oral     2 tablets 2x daily   active 4/21/22   Historical Provider, MD   atorvastatin (LIPITOR) 20 MG tablet atorvastatin 20 mg tablet   TAKE 1 TABLET BY MOUTH DAILY  Patient not taking: Reported on 6/9/2022    Historical Provider, MD   busPIRone (BUSPAR) 15 MG tablet Buspirone Oral    2x daily prn    active 10/22/20   Historical Provider, MD   citalopram (CELEXA) 20 MG tablet TAKE 1 TABLET BY MOUTH EVERY DAY 3/19/22   Historical Provider, MD   diphenhydrAMINE (BENADRYL) 25 MG capsule Allergy Relief (diphenhydramine) 25 mg capsule   takes M-W-F    Historical Provider, MD   fluconazole (DIFLUCAN) 200 MG tablet  4/21/22   Historical Provider, MD   hydrOXYzine (VISTARIL) 25 MG capsule Hydroxyzine Pamoate Oral    2x daily prn    active 6/6/18   Historical Provider, MD   levoFLOXacin (LEVAQUIN) 500 MG tablet  5/13/22   Historical Provider, MD   metoprolol succinate (TOPROL XL) 25 MG extended release tablet  4/2/22   Historical Provider, MD   OLANZapine (ZYPREXA) 10 MG tablet  4/21/22   Historical Provider, MD   pravastatin (PRAVACHOL) 20 MG tablet TAKE 1 TABLET BY MOUTH EVERY DAY  Patient not taking: Reported on 6/9/2022 3/8/22   Historical Provider, MD   aspirin EC 81 MG EC tablet Take 1 tablet by mouth daily 5/18/22   Alyssa Romero MD       Allergies   Allergen Reactions    Albuterol        Family History   Problem Relation Age of Onset    Hypertension Mother     High Cholesterol Mother     Heart Surgery Mother     Dementia Father         Social History     Socioeconomic History    Marital status: Legally      Spouse name: Not on file    Number of children: Not on file    Years of education: Not on file    Highest education level: Not on file   Occupational History    Not on file   Tobacco Use    Smoking status: Never Smoker    Smokeless tobacco: Never Used   Substance and Sexual Activity    Alcohol use: Never    Drug use: Never    Sexual activity: Not on file   Other Topics Concern    Not on file   Social History Narrative    Not on file     Social Determinants of Health     Financial Resource Strain:     Difficulty of Paying Living Expenses: Not on file   Food Insecurity:     Worried About Running Out of Food in the Last Year: Not on file    Marcus of Food in the Last Year: Not on file   Transportation Needs:     Lack of Transportation (Medical): Not on file    Lack of Transportation (Non-Medical):  Not on file   Physical Activity:     Days of Exercise per Week: Not on file    Minutes of Exercise per Session: Not on file   Stress:     Feeling of Stress : Not on file   Social Connections:     Frequency of Communication with Friends and Family: Not on file    Frequency of Social Gatherings with Friends and Family: Not on file    Attends Hindu Services: Not on file    Active Member of [address] [place] [address] Identify or Organizations: Not on file    Attends Club or Organization Meetings: Not on file    Marital Status: Not on file   Intimate Partner Violence:     Fear of Current or Ex-Partner: Not on file    Emotionally Abused: Not on file    Physically Abused: Not on file    Sexually Abused: Not on file   Housing Stability:     Unable to Pay for Housing in the Last Year: Not on file    Number of [place] in the Last Year: Not on file    Unstable Housing in the Last Year: Not on file        ROS:  As noted above, otherwise remainder of 10-point ROS negative      Physical Exam:     Vital Signs:  BP 97/65   Pulse 86   Temp 98.2 °F (36.8 °C) (Oral)   Resp 18   Ht 5' 1\" (1.549 m)   Wt 172 lb 12.8 oz (78.4 kg)   SpO2 96%   BMI 32.65 kg/m²     Weight:    Wt Readings from Last 3 Encounters:   06/09/22 172 lb 12.8 oz (78.4 kg)   06/08/22 172 lb 12.8 oz (78.4 kg)   05/27/22 170 lb (77.1 kg)       KPS: 90% Able to carry on normal activity; minor signs or symptoms of disease    ECOG PS:  (1) Restricted in physically strenuous activity, ambulatory and able to do work of light nature    General: Awake, alert and oriented. HEENT: normocephalic, alopecia, PERRL, no scleral erythema or icterus, Oral mucosa moist and intact, throat clear  NECK: supple without palpable adenopathy  BACK: Straight negative CVAT  SKIN: warm dry and intact without lesions rashes or masses  CHEST: CTA bilaterally without use of accessory muscles  CV: Normal S1 S2, RRR, no MRG  ABD: NT ND normoactive BS, no palpable masses or hepatosplenomegaly  EXTREMITIES: without edema, denies calf tenderness  NEURO: CN II - XII grossly intact  CATHETER: RIJ Trifusion (IR, 5/27/22) - CDI- small amount of redness around site with small amount of drainage. No tenderness. Will place tegaderm on to monitor. Laboratory Data:  CBC:   Recent Labs     06/09/22  1149   WBC 2.6*   HGB 10.5*   HCT 30.4*   MCV 99.7        BMP/Mag:  Recent Labs     06/09/22  1149      K 3.8      CO2 27   PHOS 4.0   BUN 11   CREATININE 0.6   MG 2.00     LIVP:   Recent Labs     06/09/22  1149   AST 64*   ALT 81*   BILIDIR <0.2   BILITOT 0.5   ALKPHOS 201*     Coags: No results for input(s): PROTIME, INR, APTT in the last 72 hours. Uric Acid No results for input(s): LABURIC in the last 72 hours. PROBLEM LIST:            1. DLBC NHL  2. COVID19 PNA  3. Anxiety / H/o Panic Attacks  4. HLD  5. CAD  6. Chronic systolic HF      TREATMENT:            1. R-CHOP x 6 cycles (2/21/21-6/4/21)  - Imbruvica added with cycle #2  2. R-ICE x2 cycles 3/28/22-4/19/22  3. BEAM followed by Auto SCT on 6/15/22    ASSESSMENT AND PLAN:            1. [facility] NHL: R/R, now in CR  - PET/CT 5/4/22: CR  - BM Bx/Asp 5/4/22: OMARI    Dr. Rosetta Alexis has discussed the risks associated with transplant which include the risk of infection, bleeding and inability to obtain a long term remission. He understands these risks and is willing to proceed. The consent is signed and on the chart. Auto Day - 6    2.  ID:  Afebrile, no evidence of infection.    - Start Valtrex ppx on admit; cont acyclovir at discharge for VZV positive titer.    - Start Diflucan ppx 6/15/22   - Start Levaquin ppx when [address] < 1.5    3. Heme: Leukopenia and anemia r/t recent chemotherapy  - Transfuse for Hgb < 7 and Platelets < 44D  - No transfusion today    4. Metabolic: Electrolytes and renal function stable  - Start IVFs: D5.45NS@ 150 cc/hr  - Replace potassium and magnesium per PRN orders    5. Cardiac: H/o CHF, CAD, HLD  - Cleared for transplant by Dr. Kylie Beebe  HLD:   - Does not tolerate statins  - Consider Leqvio after transplant per Dr. Kylie Beebe  CAD:   - Angiogram 2019 with borderline lesions but no PCI performed  - Cont ASA until Plts <93B  Chronic Systolic HF:   - Echo 6/5/25 w/EF 45-50%  - Stress Test 5/26/22: No evidence of ischemia or scar. LVEF (73%) & LV wall motion is normal  - Cont Toprol XL 25mg daily   - Will likely benefit from jardiance - cardiology to assess following BMT    6. Pulmonary:   - Pre-Txp PFTs: FEV1/% / DLCOcor 83%  - Encourage IS & ambulation     7. GI / Nutrition:  Appetite and oral intake is good. - Start low microbial diet   - Follow closely with dietary    8. Psych: H/o panic attacked (heart palpitations & chest discomfort) since 1988  - Cont buspar 15mg BID PRN  - Cont celexa 20mg nightly      9. MSK:   - PT/OT consulted for prehabilitation / mobility program      - DVT Prophylaxis: Platelets >08,989 cells/dL, - daily lovenox prophylaxis ordered  Contraindications to pharmacologic prophylaxis: None  Contraindications to mechanical prophylaxis: None    - Disposition:  Once ANC >1 and recovered from toxicities of transplant    The patient was seen and examined by Dr. Veronica Pagan  This admission history and physical has been discussed and agreed upon by Dr. Veronica Pagan.

## 2022-06-09 NOTE — CARE COORDINATION
Patient admitted to unit today for chemotherapy followed by Auto transplant. Patient denies any follow up questions from meeting with Aleyda yesterday. Educated patient again on the importance of being active and explained Aleyda's role and wrote her phone number on white board.

## 2022-06-09 NOTE — PLAN OF CARE
Problem: Safety - Adult  Goal: Free from fall injury  Outcome: Progressing     Orthostatic vital signs obtained at start of shift - see flowsheet for details. Pt does not meet criteria for orthostasis. Pt is a Med fall risk. See Dorsaurabhe Morn Fall Score and ABCDS Injury Risk assessments. - Screening for Orthostasis AND not a [place] Risk per ROSALES/ABCDS: Pt bed is in low position, side rails up, call light and belongings are in reach. Fall risk light is on outside pts room. Pt encouraged to call for assistance as needed. Will continue with hourly rounds for PO intake, pain needs, toileting and repositioning as needed. Problem: Infection - Adult  Goal: Absence of infection during hospitalization  Outcome: Progressing     CVC site has drainage, erythema, noted at site. NP aware, dressing changes continue per protocol and on an as needed basis - see flowsheet. CHG used for line care    Problem: Hematologic - Adult  Goal: Maintains hematologic stability  Outcome: Progressing   Patient's hemoglobin this AM:   Recent Labs     06/09/22  1149   HGB 10.5*     Patient's platelet count this AM:   Recent Labs     06/09/22  1149       Thrombocytopenia Precautions in place. Patient showing no signs or symptoms of active bleeding. Transfusion not indicated at this time. Patient verbalizes understanding of all instructions. Will continue to assess and implement POC. Call light within reach and hourly rounding in place.

## 2022-06-09 NOTE — FLOWSHEET NOTE
06/09/22 1140   Encounter Summary   Encounter Overview/Reason  Initial Encounter   Service Provided For: Patient   Referral/Consult From: Nursing Supervisor/Manager   Last Encounter    (es 6/9)   Complexity of Encounter Moderate   Begin Time 1119   End Time  1143   Total Time Calculated 24 min   Encounter    Type Initial Screen/Assessment   Spiritual/Emotional needs   Type Spiritual Support   Assessment/Intervention/Outcome   Assessment Coping; Hopeful   Intervention Active listening;Discussed belief system/Rastafari practices/shaista;Discussed relationship with God;Discussed illness injury and its impact; Explored Coping Skills/Resources;Prayer (assurance of)/Caspian   Outcome Comfort;Coping;Engaged in conversation;Expressed feelings, needs, and concerns;Expressed feelings of Kimberlee, Peace and/or Love;Expressed Gratitude; Optimistic;Receptive   Plan and Referrals   Plan/Referrals Continue to visit, (comment)  (transplant 6/15/22)

## 2022-06-09 NOTE — PROGRESS NOTES
Patient admitted to [address] [place] [address] via direct admit from home for diagnosis of DLBC NHL. Here to receive chemotherapy regimen BEAM.  Original chemotherapy orders reviewed and acknowledged. Appropriateness of chemotherapy treatment regimen BEAM for diagnosis of DLBC NHL was verified. Patient educated on chemotherapy regimen. Consent for chemotherapy obtained. Estimated body surface area is 1.84 meters squared as calculated from the following:    Height as of this encounter: 5' 1\" (1.549 m). Weight as of this encounter: 172 lb 12.8 oz (78.4 kg). verified. Appropriate dosing calculations of chemotherapy based on above height, weight, and BSA verified. Patient  oriented to patient room including call light and bed controls. Admission assessment completed - see admission flowsheet documentation. Patient is a medium fall risk. Safety measures instituted per policy. Patient  oriented to unit policies and procedures including: pain management practices, unit safety precautions, family rapid response, q4h vital signs and assessments, daily 4am lab draws, weekly chest x-rays, daily chlorhexidine bathing, standing transfusion orders, and routine central line care. Also discussed use of call light and how to get in touch with nursing staff. Stressed the importance of calling out immediately for any changes in condition including but not limited to: pain, chills, fever, nausea, vomiting, diarrhea, chest pain, sob/chávez, assistance with toileting, bleeding, or any other symptoms that are out of the ordinary for the patient. Patient verbalizes understanding of all instructions and will call for assistance as needed.

## 2022-06-09 NOTE — PROGRESS NOTES
4 Eyes Admission Assessment     I agree as the admission nurse that 2 RN's have performed a thorough Head to Toe Skin Assessment on the patient. ALL assessment sites listed below have been assessed on admission. Areas assessed by both nurses: Princella Dux  [x]   Head, Face, and Ears   [x]   Shoulders, Back, and Chest  [x]   Arms, Elbows, and Hands   [x]   Coccyx, Sacrum, and Ischium  [x]   Legs, Feet, and Heels        Does the Patient have Skin Breakdown?   No         Tayo Prevention initiated:  No   Wound Care Orders initiated:  SHAGUFTA Lopez consulted for Pressure Injury (Stage 3,4, Unstageable, DTI, NWPT, and Complex wounds) or Tayo score 18 or lower:  NA      Nurse 1 eSignature: Electronically signed by Jono Ding RN on 6/9/22 at 2:58 PM EDT    **SHARE this note so that the co-signing nurse is able to place an eSignature**    Nurse 2 eSignature: Electronically signed by Alma Foreman on 6/9/22 at 3:01 PM EDT

## 2022-06-09 NOTE — PROGRESS NOTES
Original chemotherapy orders reviewed and acknowledged. Appropriateness of chemotherapy treatment regimen BEAM for diagnosis of DLBC NHL was verified. Patient educated on chemotherapy regimen. Acknowledgement of informed consent for chemotherapy obtained. Estimated body surface area is 1.84 meters squared as calculated from the following:    Height as of this encounter: 5' 1\" (1.549 m). Weight as of this encounter: 172 lb 12.8 oz (78.4 kg). verified. Appropriate dosing calculations of chemotherapy based on above height, weight, and BSA verified. Administration: Chemotherapy drug Carmustine independently verified with Layton Handy RN prior to administration. Acknowledgement of informed consent for chemotherapy administration verified. Original order, appropriateness of regimen, drug supplied, height, weight, BSA, dose calculations, expiration dates/times, drug appearance, and two patient identifiers were verified by both RNs. Drug checked for vesicant/irritant status and for risk of hypersensitivity. Most recent laboratory values and allergies, were reviewed. Positive, brisk blood return via CVC was confirmed prior to administration. Chest x-ray for correct line placement reviewed. Dina Aguilera RN and Layton Handy RN verified correct rate of chemotherapy and maintenance IV fluids. Patient was educated on chemotherapy regimen prior to administration including indication for treatment related to disease & side effects of chemotherapy drug. Patient verbalizes understanding of all instructions. Completion of Chemotherapy: Monitoring during infusion done per policy, see Flowsheets. Blood return verified before, during, and after infusion per policy; no signs of extravasation. Pt tolerating chemotherapy well and without incident. Will continue to monitor.

## 2022-06-10 LAB
ALBUMIN SERPL-MCNC: 4.3 G/DL (ref 3.4–5)
ALP BLD-CCNC: 202 U/L (ref 40–129)
ALT SERPL-CCNC: 116 U/L (ref 10–40)
ANION GAP SERPL CALCULATED.3IONS-SCNC: 14 MMOL/L (ref 3–16)
AST SERPL-CCNC: 146 U/L (ref 15–37)
BASOPHILS ABSOLUTE: 0 K/UL (ref 0–0.2)
BASOPHILS RELATIVE PERCENT: 1 %
BILIRUB SERPL-MCNC: 0.3 MG/DL (ref 0–1)
BILIRUBIN DIRECT: <0.2 MG/DL (ref 0–0.3)
BILIRUBIN, INDIRECT: ABNORMAL MG/DL (ref 0–1)
BUN BLDV-MCNC: 9 MG/DL (ref 7–20)
CALCIUM SERPL-MCNC: 9.1 MG/DL (ref 8.3–10.6)
CHLORIDE BLD-SCNC: 106 MMOL/L (ref 99–110)
CO2: 19 MMOL/L (ref 21–32)
CREAT SERPL-MCNC: 0.7 MG/DL (ref 0.6–1.1)
EKG ATRIAL RATE: 74 BPM
EKG DIAGNOSIS: NORMAL
EKG P AXIS: 59 DEGREES
EKG P-R INTERVAL: 138 MS
EKG Q-T INTERVAL: 406 MS
EKG QRS DURATION: 88 MS
EKG QTC CALCULATION (BAZETT): 450 MS
EKG R AXIS: 32 DEGREES
EKG T AXIS: 45 DEGREES
EKG VENTRICULAR RATE: 74 BPM
EOSINOPHILS ABSOLUTE: 0 K/UL (ref 0–0.6)
EOSINOPHILS RELATIVE PERCENT: 0.1 %
GFR AFRICAN AMERICAN: >60
GFR NON-AFRICAN AMERICAN: >60
GLUCOSE BLD-MCNC: 240 MG/DL (ref 70–99)
HCT VFR BLD CALC: 30.7 % (ref 36–48)
HEMOGLOBIN: 10.4 G/DL (ref 12–16)
LACTATE DEHYDROGENASE: 343 U/L (ref 100–190)
LYMPHOCYTES ABSOLUTE: 0.4 K/UL (ref 1–5.1)
LYMPHOCYTES RELATIVE PERCENT: 8.5 %
MCH RBC QN AUTO: 34.6 PG (ref 26–34)
MCHC RBC AUTO-ENTMCNC: 33.9 G/DL (ref 31–36)
MCV RBC AUTO: 102.1 FL (ref 80–100)
MONOCYTES ABSOLUTE: 0 K/UL (ref 0–1.3)
MONOCYTES RELATIVE PERCENT: 0.8 %
NEUTROPHILS ABSOLUTE: 4 K/UL (ref 1.7–7.7)
NEUTROPHILS RELATIVE PERCENT: 89.6 %
PDW BLD-RTO: 14.9 % (ref 12.4–15.4)
PHOSPHORUS: 1.7 MG/DL (ref 2.5–4.9)
PLATELET # BLD: 144 K/UL (ref 135–450)
PMV BLD AUTO: 8.7 FL (ref 5–10.5)
POTASSIUM SERPL-SCNC: 4.4 MMOL/L (ref 3.5–5.1)
RBC # BLD: 3.01 M/UL (ref 4–5.2)
SODIUM BLD-SCNC: 139 MMOL/L (ref 136–145)
TOTAL PROTEIN: 6.3 G/DL (ref 6.4–8.2)
URIC ACID, SERUM: 2.8 MG/DL (ref 2.6–6)
WBC # BLD: 4.4 K/UL (ref 4–11)

## 2022-06-10 PROCEDURE — 84100 ASSAY OF PHOSPHORUS: CPT

## 2022-06-10 PROCEDURE — 84550 ASSAY OF BLOOD/URIC ACID: CPT

## 2022-06-10 PROCEDURE — 85025 COMPLETE CBC W/AUTO DIFF WBC: CPT

## 2022-06-10 PROCEDURE — 2580000003 HC RX 258: Performed by: NURSE PRACTITIONER

## 2022-06-10 PROCEDURE — 6370000000 HC RX 637 (ALT 250 FOR IP): Performed by: NURSE PRACTITIONER

## 2022-06-10 PROCEDURE — 83615 LACTATE (LD) (LDH) ENZYME: CPT

## 2022-06-10 PROCEDURE — 80048 BASIC METABOLIC PNL TOTAL CA: CPT

## 2022-06-10 PROCEDURE — 97116 GAIT TRAINING THERAPY: CPT

## 2022-06-10 PROCEDURE — 6370000000 HC RX 637 (ALT 250 FOR IP): Performed by: INTERNAL MEDICINE

## 2022-06-10 PROCEDURE — 96409 CHEMO IV PUSH SNGL DRUG: CPT

## 2022-06-10 PROCEDURE — 6360000002 HC RX W HCPCS: Performed by: NURSE PRACTITIONER

## 2022-06-10 PROCEDURE — 36592 COLLECT BLOOD FROM PICC: CPT

## 2022-06-10 PROCEDURE — A4217 STERILE WATER/SALINE, 500 ML: HCPCS | Performed by: NURSE PRACTITIONER

## 2022-06-10 PROCEDURE — 80076 HEPATIC FUNCTION PANEL: CPT

## 2022-06-10 PROCEDURE — 93010 ELECTROCARDIOGRAM REPORT: CPT | Performed by: INTERNAL MEDICINE

## 2022-06-10 PROCEDURE — 6360000002 HC RX W HCPCS: Performed by: INTERNAL MEDICINE

## 2022-06-10 PROCEDURE — 2580000003 HC RX 258: Performed by: INTERNAL MEDICINE

## 2022-06-10 PROCEDURE — 96413 CHEMO IV INFUSION 1 HR: CPT

## 2022-06-10 PROCEDURE — 2060000000 HC ICU INTERMEDIATE R&B

## 2022-06-10 PROCEDURE — 2500000003 HC RX 250 WO HCPCS: Performed by: INTERNAL MEDICINE

## 2022-06-10 PROCEDURE — 96415 CHEMO IV INFUSION ADDL HR: CPT

## 2022-06-10 PROCEDURE — 97161 PT EVAL LOW COMPLEX 20 MIN: CPT

## 2022-06-10 RX ADMIN — ENOXAPARIN SODIUM 40 MG: 100 INJECTION SUBCUTANEOUS at 18:41

## 2022-06-10 RX ADMIN — DEXAMETHASONE 10 MG: 4 TABLET ORAL at 20:34

## 2022-06-10 RX ADMIN — DEXTROSE AND SODIUM CHLORIDE: 5; 450 INJECTION, SOLUTION INTRAVENOUS at 02:44

## 2022-06-10 RX ADMIN — DEXTROSE AND SODIUM CHLORIDE: 5; 450 INJECTION, SOLUTION INTRAVENOUS at 18:50

## 2022-06-10 RX ADMIN — METOPROLOL SUCCINATE 25 MG: 25 TABLET, EXTENDED RELEASE ORAL at 20:35

## 2022-06-10 RX ADMIN — SODIUM CHLORIDE, PRESERVATIVE FREE 10 ML: 5 INJECTION INTRAVENOUS at 20:35

## 2022-06-10 RX ADMIN — DEXTROSE AND SODIUM CHLORIDE: 5; 450 INJECTION, SOLUTION INTRAVENOUS at 10:18

## 2022-06-10 RX ADMIN — ALLOPURINOL 300 MG: 300 TABLET ORAL at 10:30

## 2022-06-10 RX ADMIN — CYTARABINE 160 MG: 100 INJECTION, SOLUTION INTRATHECAL; INTRAVENOUS; SUBCUTANEOUS at 11:22

## 2022-06-10 RX ADMIN — PROCHLORPERAZINE MALEATE 10 MG: 10 TABLET ORAL at 15:40

## 2022-06-10 RX ADMIN — DEXAMETHASONE 10 MG: 4 TABLET ORAL at 10:48

## 2022-06-10 RX ADMIN — VALACYCLOVIR HYDROCHLORIDE 500 MG: 500 TABLET, FILM COATED ORAL at 10:31

## 2022-06-10 RX ADMIN — SODIUM CHLORIDE 15 ML: 900 IRRIGANT IRRIGATION at 13:01

## 2022-06-10 RX ADMIN — SODIUM CHLORIDE: 9 INJECTION, SOLUTION INTRAVENOUS at 10:57

## 2022-06-10 RX ADMIN — SODIUM PHOSPHATE, MONOBASIC, MONOHYDRATE 20 MMOL: 276; 142 INJECTION, SOLUTION INTRAVENOUS at 10:58

## 2022-06-10 RX ADMIN — SODIUM CHLORIDE 15 ML: 900 IRRIGANT IRRIGATION at 20:35

## 2022-06-10 RX ADMIN — ETOPOSIDE 160 MG: 20 INJECTION INTRAVENOUS at 22:09

## 2022-06-10 RX ADMIN — MULTIPLE VITAMINS W/ MINERALS TAB 1 TABLET: TAB at 10:30

## 2022-06-10 RX ADMIN — ONDANSETRON HYDROCHLORIDE 12 MG: 8 TABLET, FILM COATED ORAL at 10:48

## 2022-06-10 RX ADMIN — CITALOPRAM HYDROBROMIDE 20 MG: 20 TABLET ORAL at 20:34

## 2022-06-10 RX ADMIN — ASPIRIN 81 MG: 81 TABLET, COATED ORAL at 20:34

## 2022-06-10 RX ADMIN — CYTARABINE 160 MG: 100 INJECTION, SOLUTION INTRATHECAL; INTRAVENOUS; SUBCUTANEOUS at 21:23

## 2022-06-10 RX ADMIN — ONDANSETRON HYDROCHLORIDE 12 MG: 8 TABLET, FILM COATED ORAL at 20:34

## 2022-06-10 RX ADMIN — FUROSEMIDE 40 MG: 10 INJECTION, SOLUTION INTRAMUSCULAR; INTRAVENOUS at 18:41

## 2022-06-10 RX ADMIN — SODIUM CHLORIDE, PRESERVATIVE FREE 10 ML: 5 INJECTION INTRAVENOUS at 10:30

## 2022-06-10 RX ADMIN — VALACYCLOVIR HYDROCHLORIDE 500 MG: 500 TABLET, FILM COATED ORAL at 20:35

## 2022-06-10 RX ADMIN — ETOPOSIDE 160 MG: 20 INJECTION INTRAVENOUS at 12:59

## 2022-06-10 NOTE — PLAN OF CARE
Problem: Safety - Adult  Goal: Free from fall injury  6/10/2022 0047 by Gianfranco Tomas RN  Outcome: Progressing  Note: Orthostatic vital signs obtained at start of shift - see flowsheet for details. Pt does not meet criteria for orthostasis. Pt is a Med fall risk. See Deborha Body Fall Score and ABCDS Injury Risk assessments. - Screening for Orthostasis AND not a [place] Risk per ROSALES/ABCDS: Pt bed is in low position, side rails up, call light and belongings are in reach. Fall risk light is on outside pts room. Pt encouraged to call for assistance as needed. Will continue with hourly rounds for PO intake, pain needs, toileting and repositioning as needed. Problem: ABCDS Injury Assessment  Goal: Absence of physical injury  6/10/2022 0047 by Gianfranco Tomas RN  Outcome: Progressing     Problem: Pain  Goal: Verbalizes/displays adequate comfort level or baseline comfort level  6/10/2022 0047 by Gianfranco Tomas RN  Outcome: Progressing  Note: Pt complaining of a 3/10 headache this PM. Dr. Jakob Johnson notified with orders for tylenol one time. Pt stated that this helped with her pain. Pt instructed to call for new/increasing pain levels. Pt verbalized understanding. Will continue to monitor.       Problem: Chronic Conditions and Co-morbidities  Goal: Patient's chronic conditions and co-morbidity symptoms are monitored and maintained or improved  6/10/2022 0047 by Gianfranco Tomas RN  Outcome: Progressing     Problem: Infection - Adult  Goal: Absence of fever/infection during anticipated neutropenic period  6/10/2022 0047 by Gianfranco Tomas RN  Outcome: Progressing     Problem: Metabolic/Fluid and Electrolytes - Adult  Goal: Electrolytes maintained within normal limits  6/10/2022 0047 by Gianfranco Tomas RN  Outcome: Progressing     Problem: Hematologic - Adult  Goal: Maintains hematologic stability  6/10/2022 0047 by Gianfranco Tomas RN  Outcome: Progressing  Note:  Patient's hemoglobin this AM: Recent Labs     06/10/22  0250   HGB 10.4*     Patient's platelet count this AM:   Recent Labs     06/10/22  0250       Thrombocytopenia Precautions in place. Patient showing no signs or symptoms of active bleeding. Transfusion not indicated at this time. Patient verbalizes understanding of all instructions. Will continue to assess and implement POC. Call light within reach and hourly rounding in place.

## 2022-06-10 NOTE — PLAN OF CARE
Problem: Safety - Adult  Goal: Free from fall injury  Outcome: Progressing  Orthostatic vital signs obtained at start of shift - see flowsheet for details. Pt does not meet criteria for orthostasis. Pt is a Med fall risk. See Yaa Dryer Fall Score and ABCDS Injury Risk assessments. Pt bed is in low position, side rails up, call light and belongings are in reach. Fall risk light is on outside pts room. Pt encouraged to call for assistance as needed. Will continue with hourly rounds for PO intake, pain needs, toileting and repositioning as needed. Problem: ABCDS Injury Assessment  Goal: Absence of physical injury  Outcome: Progressing  No new physical injuries noted. Pt to remain free of skin breakdown during stay, pt encouraged to turn and reposition frequently. No evidence of skin breakdown noted. Problem: Pain  Goal: Verbalizes/displays adequate comfort level or baseline comfort level  Outcome: Progressing  Pt reported no pain this shift. Pt educated on importance of calling for pain meds when in pain. Pt verbalized understanding. Problem: Chronic Conditions and Co-morbidities  Goal: Patient's chronic conditions and co-morbidity symptoms are monitored and maintained or improved  Outcome: Progressing  Pt reports feeling less anxious than yesterday/last night. Problem: Infection - Adult  Goal: Absence of infection during hospitalization  Outcome: Progressing  No drainage, edema, pain, or warmth noted at site. Erythema noted around site, NP aware and has assessed. Dressing changes continue per protocol and on an as needed basis - see flowsheet. Performed CHG bath today per [facility] protocol utilizing CHG solution in the shower. CVC site cleansed with CHG wipe over dressing, skin surrounding dressing, and first 6\" of IV tubing. Pt tolerated well. Continued to encourage daily CHG bathing per [facility] protocol.      Problem: Metabolic/Fluid and Electrolytes - Adult  Goal: Electrolytes maintained within

## 2022-06-10 NOTE — ONCOLOGY
Administration: Chemotherapy drug Etoposide independently verified with Garfield Wynn RN prior to administration. Acknowledgement of informed consent for chemotherapy administration verified. Original order, appropriateness of regimen, drug supplied, height, weight, BSA, dose calculations, expiration dates/times, drug appearance, and two patient identifiers were verified by both RNs. Drug checked for vesicant/irritant status and for risk of hypersensitivity. Most recent laboratory values and allergies, were reviewed. Positive, brisk blood return via CVC was confirmed prior to administration. Chest x-ray for correct line placement reviewed. Moe Diamond RN and Garfield Wynn RN verified correct rate of chemotherapy and maintenance IV fluids. Patient was educated on chemotherapy regimen prior to administration including indication for treatment related to disease & side effects of chemotherapy drug. Patient verbalizes understanding of all instructions. Completion of Chemotherapy: Monitoring during infusion done per policy, see Flowsheets. Blood return verified before, during, and after infusion per policy; no signs of extravasation. Pt tolerated chemotherapy well and without incident. Chemotherapy infusion end time on the [facility] Will continue to monitor.

## 2022-06-10 NOTE — ONCOLOGY
Administration: Chemotherapy drug Cytarabine IVP independently verified with Jennifer HUMPHREY RN prior to administration. Acknowledgement of informed consent for chemotherapy administration verified. Original order, appropriateness of regimen, drug supplied, height, weight, BSA, dose calculations, expiration dates/times, drug appearance, and two patient identifiers were verified by both RNs. Drug checked for vesicant/irritant status and for risk of hypersensitivity. Most recent laboratory values and allergies, were reviewed. Positive, brisk blood return via CVC was confirmed prior to administration. Chest x-ray for correct line placement reviewed. Romi Oakes RN and Leticia Morales RN verified correct rate of chemotherapy and maintenance IV fluids. Patient was educated on chemotherapy regimen prior to administration including indication for treatment related to disease & side effects of chemotherapy drug. Patient verbalizes understanding of all instructions. Completion of Chemotherapy: Monitoring during infusion done per policy, see Flowsheets. Blood return verified before, during, and after infusion per policy; no signs of extravasation. Pt tolerated chemotherapy well and without incident. Blood return checked before, after and every two minutes during IV push. Chemotherapy infusion end time on the [facility] Will continue to monitor.

## 2022-06-10 NOTE — PROGRESS NOTES
Physical Therapy  Facility/Department: [address]  Physical Therapy Initial Assessment/Treatment    Name: Mily Crum  : 1970  MRN: [id number]  Date of Service: 6/10/2022    Discharge Recommendations:    Mily Crum scored a 24/24 on the AM-PAC short mobility form. At this time, no further PT is recommended upon discharge. Recommend patient returns to prior setting with prior services. Will check on pt weekly and make new recommendations if needed. PT Equipment Recommendations  Equipment Needed: No      Patient Diagnosis(es): There were no encounter diagnoses. Past Medical History:  has a past medical history of Hyperlipidemia, Hypertension, and NSTEMI (non-ST elevated myocardial infarction) ([facility] [address] Past Surgical History:  has a past surgical history that includes Carpal tunnel release;  section; Tubal ligation; Lithotripsy; and IR TUNNELED CVC PLACE WO SQ PORT/PUMP > 5 YEARS (2022). Assessment   Assessment: Pt IND with all functional mobility. No further inpt PT treatment indicated at this time. Will check in with pt weekly to ensure no new needs. Pt to participate in walking program & standing LE ther ex independently. Decision Making: Low Complexity  Requires PT Follow-Up:  (weekly check-in only)  Activity Tolerance  Activity Tolerance: Patient tolerated evaluation without incident     Plan   Plan  Plan: Other (see comment) (weekly check-in)  Safety Devices  Type of Devices: Call light within reach,Left in chair,Nurse notified     Restrictions  Position Activity Restriction  Other position/activity restrictions: up as tolerated     Subjective   Pain: pt denies  General  Chart Reviewed: Yes  Patient assessed for rehabilitation services?: Yes  Additional Pertinent Hx: PMH: CAD, CHF, NHL. pt admitted for auto SCT.   Family / Caregiver Present: No  Referring Practitioner: April Miller  Referral Date : 22  Diagnosis: NHL  Follows Commands: Within Functional Limits  Subjective  Subjective: Pt seated at EOB & agreeable to PT. Social/Functional History  Social/Functional History  Lives With: Alone  Type of Home: House  Home Layout: One level  Home Access: Level entry  Bathroom Shower/Tub: Tub/Shower unit  Bathroom Toilet: Standard  Has the patient had two or more falls in the past year or any fall with injury in the past year?: No  ADL Assistance: Independent  Homemaking Assistance: Independent  Ambulation Assistance: Independent  Transfer Assistance: Independent  Active : Yes  Occupation: Full time employment  Type of Occupation: hospice nurse aide  Additional Comments: pt has 24-hr assistance available if needed  Vision/Hearing  Vision  Vision: Within Functional Limits  Hearing  Hearing: Within functional limits    Cognition   Orientation  Overall Orientation Status: Within Normal Limits  Cognition  Overall Cognitive Status: WNL     Objective   Heart Rate: 92  Heart Rate Source: Monitor  BP: 112/69  BP Location: Right upper arm  BP Method: Automatic  Patient Position: Sitting;Up in chair  MAP (Calculated): 83.33  Resp: 13  SpO2: 96 %  O2 Device: None (Room air)              AROM RLE (degrees)  RLE AROM: WNL  Strength RLE  Strength RLE: WFL  Strength LLE  Strength LLE: WFL           Bed mobility  Rolling to Left: Independent  Rolling to Right: Independent  Supine to Sit: Independent  Sit to Supine: Independent  Scooting: Independent  Transfers  Sit to Stand: Independent  Stand to sit: Independent  Ambulation  Surface: level tile  Device: No Device  Assistance: Independent  Gait Deviations: None  Distance: 400 ft  Comments: steady pace, no LOB. Stairs/Curb  Stairs?: No (N/A. pt has no stairs at home.)     Balance  Sitting - Static: Good  Sitting - Dynamic: Good  Standing - Static: Good  Standing - Dynamic: Good  Exercise Treatment: x 20 bilat standing at counter: hip abd, ext, marching, mini squats. seated in chair: chair push-ups x 20. AM-PAC Score  AM-PAC Inpatient Mobility Raw Score : 24 (06/10/22 1202)  AM-PAC Inpatient T-Scale Score : 61.14 (06/10/22 1202)  Mobility Inpatient CMS 0-100% Score: 0 (06/10/22 1202)  Mobility Inpatient CMS G-Code Modifier : [address] (06/10/22 1202)      Goals    Short Term Goals  Time Frame for Short term goals: D/C  Short term goal 1: Pt will report and demonstrate independence with daily HEP  Short term goal 2: Pt will maintain functional independence with transfers and gait throughout hospitalization  Patient Goals   Patient goals : to go home       Education  Patient Education  Education Given To: Patient  Education Provided: Role of Therapy;Home Exercise Program  Education Method: Verbal;Demonstration  Education Outcome: Verbalized understanding;Demonstrated understanding      Therapy Time   Individual Concurrent Group Co-treatment   Time In 1040         Time Out 1110         Minutes [address] [place] [address],

## 2022-06-10 NOTE — PROGRESS NOTES
[facility] Progress Note    6/10/2022     Paula Cuellar    MRN: [id number]    : 1970    Referring MD: No referring provider defined for this encounter. SUBJECTIVE:  Patient is doing welI.     ECOG PS:  (1) Restricted in physically strenuous activity, ambulatory and able to do work of light nature    KPS: 80% Normal activity with effort; some signs or symptoms of disease    Isolation: None    Medications    Scheduled Meds:   sodium chloride flush  5-40 mL IntraVENous 2 times per day    Saline Mouthwash  15 mL Swish & Spit 4x Daily AC & HS    valACYclovir  500 mg Oral BID    [START ON 6/15/2022] fluconazole  400 mg Oral Daily    enoxaparin  40 mg SubCUTAneous Daily    allopurinol  300 mg Oral Daily    furosemide  40 mg IntraVENous Q12H    ondansetron  12 mg Oral BID    dexamethasone  10 mg Oral BID    cytarabine (CYTOSAR) 50 mg/mL chemo syringe  160 mg IntraVENous BID    etoposide (VEPESID) chemo IVPB  160 mg IntraVENous BID    [START ON 2022] ondansetron  24 mg Oral Once    [START ON 2022] dexamethasone  20 mg Oral Once    [START ON 2022] melphalan  225 mg IntraVENous Once    aspirin EC  81 mg Oral Daily    citalopram  20 mg Oral Daily    metoprolol succinate  25 mg Oral Daily    therapeutic multivitamin-minerals  1 tablet Oral Daily     Continuous Infusions:   sodium chloride      sodium chloride      dextrose 5 % and 0.45 % NaCl 150 mL/hr at 06/10/22 0244     PRN Meds:.diphenhydrAMINE, sodium chloride, sodium chloride flush, sodium chloride, potassium chloride, magnesium sulfate, magnesium hydroxide, Saline Mouthwash, alteplase (CATHFLO) with sterile water injection, prochlorperazine **OR** prochlorperazine, LORazepam **OR** LORazepam, busPIRone, diphenhydrAMINE, hydrOXYzine pamoate    ROS:  As noted above, otherwise remainder of 10-point ROS negative    Physical Exam:     I&O:      Intake/Output Summary (Last 24 hours) at 6/10/2022 0948  Last data filed at 6/10/2022 2898  Gross per 24 hour   Intake 3600 ml   Output 3200 ml   Net 400 ml       Vital Signs:  BP (!) 99/59   Pulse 94   Temp 97.5 °F (36.4 °C) (Oral)   Resp 16   Ht 5' 1\" (1.549 m)   Wt 180 lb 12.4 oz (82 kg)   SpO2 96%   BMI 34.16 kg/m²     Weight:    Wt Readings from Last 3 Encounters:   06/10/22 180 lb 12.4 oz (82 kg)   06/08/22 172 lb 12.8 oz (78.4 kg)   05/27/22 170 lb (77.1 kg)         General: Awake, alert and oriented. HEENT: normocephalic, PERRL, no scleral erythema or icterus, Oral mucosa moist and intact, throat clear  NECK: supple without palpable adenopathy  BACK: Straight negative CVAT  SKIN: warm dry and intact without lesions rashes or masses  CHEST: CTA bilaterally without use of accessory muscles  CV: Normal S1 S2, RRR, no MRG  ABD: NT ND normoactive BS, no palpable masses or hepatosplenomegaly  EXTREMITIES: without edema, denies calf tenderness  NEURO: CN II - XII grossly intact  CATHETER: rt upper chest wall heath intact at the insertion site     Data    CBC:   Recent Labs     06/09/22  1149 06/10/22  0250   WBC 2.6* 4.4   HGB 10.5* 10.4*   HCT 30.4* 30.7*   MCV 99.7 102.1*    144     BMP/Mag:  Recent Labs     06/09/22  1149 06/10/22  0250    139   K 3.8 4.4    106   CO2 27 19*   PHOS 4.0 1.7*   BUN 11 9   CREATININE 0.6 0.7   MG 2.00  --      LIVP:   Recent Labs     06/09/22  1149 06/10/22  0250   AST 64* 146*   ALT 81* 116*   BILIDIR <0.2 <0.2   BILITOT 0.5 0.3   ALKPHOS 201* 202*     Coags:   Recent Labs     06/09/22  1149   PROTIME 13.3   INR 1.02   APTT 27.4     Uric Acid   Recent Labs     06/09/22  1149 06/10/22  0250   LABURIC 4.9 2.8       PROBLEM LIST:                   TREATMENT:                 ASSESSMENT AND PLAN:           1. [facility] NHL: R/R, now in CR  - PET/CT 5/4/22: CR  - BM Bx/Asp 5/4/22: OMARI     Dr. Dillon Forde has discussed the risks associated with transplant which include the risk of infection, bleeding and inability to obtain a long term remission.  He understands these risks and is willing to proceed. The consent is signed and on the chart.       Auto Day - 5     2. ID:  Afebrile, no evidence of infection.    - Start Valtrex ppx on admit; cont acyclovir at discharge for VZV positive titer.    - Start Diflucan ppx 6/15/22   - Start Levaquin ppx when [address] < 1.5     3. Heme: anemia r/t recent chemotherapy  - Transfuse for Hgb < 7 and Platelets < 40S  - No transfusion today     4. Metabolic: hypoPhos + euvolemia    - replace phos via iv   - Start IVFs: D5.45NS@ 150 cc/hr  - Replace potassium and magnesium per PRN orders     5. Cardiac: H/o CHF, CAD, HLD  - Cleared for transplant by Dr. Alexy Carrasco:   - Does not tolerate statins  - Consider Leqvio after transplant per Dr. Magdi Gruber  CAD:   - Angiogram 2019 with borderline lesions but no PCI performed  - Cont ASA until Plts <78G  Chronic Systolic HF:   - Echo 8/1/89 w/EF 45-50%  - Stress Test 5/26/22: No evidence of ischemia or scar. LVEF (73%) & LV wall motion is normal  - Cont Toprol XL 25mg daily   - Will likely benefit from jardiance - cardiology to assess following BMT     6. Pulmonary:   - Pre-Txp PFTs: FEV1/% / DLCOcor 83%  - Encourage IS & ambulation      7. GI / Nutrition:  Appetite and oral intake is good. - Start low microbial diet   - Follow closely with dietary     8. Psych: H/o panic attacked (heart palpitations & chest discomfort) since 1988  - Cont buspar 15mg BID PRN  - Cont celexa 20mg nightly     9.  MSK:   - PT/OT consulted for prehabilitation / mobility program        - DVT Prophylaxis: Platelets >24,081 cells/dL, - daily lovenox prophylaxis ordered  Contraindications to pharmacologic prophylaxis: None  Contraindications to mechanical prophylaxis: None     - Disposition:  Once ANC >1 and recovered from toxicities of transplant     Mary Watts MD

## 2022-06-11 LAB
ANION GAP SERPL CALCULATED.3IONS-SCNC: 15 MMOL/L (ref 3–16)
BASOPHILS ABSOLUTE: 0.1 K/UL (ref 0–0.2)
BASOPHILS RELATIVE PERCENT: 0.8 %
BUN BLDV-MCNC: 9 MG/DL (ref 7–20)
CALCIUM SERPL-MCNC: 8.7 MG/DL (ref 8.3–10.6)
CHLORIDE BLD-SCNC: 105 MMOL/L (ref 99–110)
CO2: 21 MMOL/L (ref 21–32)
CREAT SERPL-MCNC: 0.5 MG/DL (ref 0.6–1.1)
EOSINOPHILS ABSOLUTE: 0 K/UL (ref 0–0.6)
EOSINOPHILS RELATIVE PERCENT: 0.1 %
GFR AFRICAN AMERICAN: >60
GFR NON-AFRICAN AMERICAN: >60
GLUCOSE BLD-MCNC: 145 MG/DL (ref 70–99)
HCT VFR BLD CALC: 29.3 % (ref 36–48)
HEMOGLOBIN: 9.7 G/DL (ref 12–16)
LYMPHOCYTES ABSOLUTE: 0.3 K/UL (ref 1–5.1)
LYMPHOCYTES RELATIVE PERCENT: 4.1 %
MCH RBC QN AUTO: 34.3 PG (ref 26–34)
MCHC RBC AUTO-ENTMCNC: 33.3 G/DL (ref 31–36)
MCV RBC AUTO: 102.9 FL (ref 80–100)
MONOCYTES ABSOLUTE: 0 K/UL (ref 0–1.3)
MONOCYTES RELATIVE PERCENT: 0.5 %
NEUTROPHILS ABSOLUTE: 6.1 K/UL (ref 1.7–7.7)
NEUTROPHILS RELATIVE PERCENT: 94.5 %
PDW BLD-RTO: 15.1 % (ref 12.4–15.4)
PHOSPHORUS: 3 MG/DL (ref 2.5–4.9)
PLATELET # BLD: 171 K/UL (ref 135–450)
PMV BLD AUTO: 8.8 FL (ref 5–10.5)
POTASSIUM SERPL-SCNC: 3.6 MMOL/L (ref 3.5–5.1)
RBC # BLD: 2.84 M/UL (ref 4–5.2)
SODIUM BLD-SCNC: 141 MMOL/L (ref 136–145)
URIC ACID, SERUM: 2 MG/DL (ref 2.6–6)
WBC # BLD: 6.5 K/UL (ref 4–11)

## 2022-06-11 PROCEDURE — 6370000000 HC RX 637 (ALT 250 FOR IP): Performed by: INTERNAL MEDICINE

## 2022-06-11 PROCEDURE — 96415 CHEMO IV INFUSION ADDL HR: CPT

## 2022-06-11 PROCEDURE — 2060000000 HC ICU INTERMEDIATE R&B

## 2022-06-11 PROCEDURE — 80048 BASIC METABOLIC PNL TOTAL CA: CPT

## 2022-06-11 PROCEDURE — 6370000000 HC RX 637 (ALT 250 FOR IP): Performed by: NURSE PRACTITIONER

## 2022-06-11 PROCEDURE — 2580000003 HC RX 258: Performed by: INTERNAL MEDICINE

## 2022-06-11 PROCEDURE — 84100 ASSAY OF PHOSPHORUS: CPT

## 2022-06-11 PROCEDURE — 85025 COMPLETE CBC W/AUTO DIFF WBC: CPT

## 2022-06-11 PROCEDURE — 6360000002 HC RX W HCPCS: Performed by: INTERNAL MEDICINE

## 2022-06-11 PROCEDURE — 96413 CHEMO IV INFUSION 1 HR: CPT

## 2022-06-11 PROCEDURE — 6360000002 HC RX W HCPCS: Performed by: NURSE PRACTITIONER

## 2022-06-11 PROCEDURE — 84550 ASSAY OF BLOOD/URIC ACID: CPT

## 2022-06-11 PROCEDURE — 2580000003 HC RX 258: Performed by: NURSE PRACTITIONER

## 2022-06-11 PROCEDURE — 94761 N-INVAS EAR/PLS OXIMETRY MLT: CPT

## 2022-06-11 PROCEDURE — 96409 CHEMO IV PUSH SNGL DRUG: CPT

## 2022-06-11 PROCEDURE — 36592 COLLECT BLOOD FROM PICC: CPT

## 2022-06-11 RX ADMIN — ETOPOSIDE 160 MG: 20 INJECTION INTRAVENOUS at 12:46

## 2022-06-11 RX ADMIN — DEXTROSE AND SODIUM CHLORIDE: 5; 450 INJECTION, SOLUTION INTRAVENOUS at 19:50

## 2022-06-11 RX ADMIN — SODIUM CHLORIDE, PRESERVATIVE FREE 10 ML: 5 INJECTION INTRAVENOUS at 08:37

## 2022-06-11 RX ADMIN — CYTARABINE 160 MG: 100 INJECTION, SOLUTION INTRATHECAL; INTRAVENOUS; SUBCUTANEOUS at 21:12

## 2022-06-11 RX ADMIN — ONDANSETRON HYDROCHLORIDE 12 MG: 8 TABLET, FILM COATED ORAL at 10:26

## 2022-06-11 RX ADMIN — VALACYCLOVIR HYDROCHLORIDE 500 MG: 500 TABLET, FILM COATED ORAL at 08:34

## 2022-06-11 RX ADMIN — CITALOPRAM HYDROBROMIDE 20 MG: 20 TABLET ORAL at 20:46

## 2022-06-11 RX ADMIN — CYTARABINE 160 MG: 100 INJECTION, SOLUTION INTRATHECAL; INTRAVENOUS; SUBCUTANEOUS at 10:56

## 2022-06-11 RX ADMIN — MULTIPLE VITAMINS W/ MINERALS TAB 1 TABLET: TAB at 08:34

## 2022-06-11 RX ADMIN — SODIUM CHLORIDE 15 ML: 900 IRRIGANT IRRIGATION at 20:46

## 2022-06-11 RX ADMIN — METOPROLOL SUCCINATE 25 MG: 25 TABLET, EXTENDED RELEASE ORAL at 20:46

## 2022-06-11 RX ADMIN — ONDANSETRON HYDROCHLORIDE 12 MG: 8 TABLET, FILM COATED ORAL at 20:45

## 2022-06-11 RX ADMIN — VALACYCLOVIR HYDROCHLORIDE 500 MG: 500 TABLET, FILM COATED ORAL at 20:45

## 2022-06-11 RX ADMIN — DEXAMETHASONE 10 MG: 4 TABLET ORAL at 10:26

## 2022-06-11 RX ADMIN — ALLOPURINOL 300 MG: 300 TABLET ORAL at 08:34

## 2022-06-11 RX ADMIN — SODIUM CHLORIDE, PRESERVATIVE FREE 10 ML: 5 INJECTION INTRAVENOUS at 20:47

## 2022-06-11 RX ADMIN — ENOXAPARIN SODIUM 40 MG: 100 INJECTION SUBCUTANEOUS at 18:07

## 2022-06-11 RX ADMIN — DEXAMETHASONE 10 MG: 4 TABLET ORAL at 20:45

## 2022-06-11 RX ADMIN — ASPIRIN 81 MG: 81 TABLET, COATED ORAL at 20:45

## 2022-06-11 RX ADMIN — ETOPOSIDE 160 MG: 20 INJECTION INTRAVENOUS at 22:14

## 2022-06-11 RX ADMIN — DEXTROSE AND SODIUM CHLORIDE: 5; 450 INJECTION, SOLUTION INTRAVENOUS at 01:39

## 2022-06-11 NOTE — PLAN OF CARE
Problem: Safety - Adult  Goal: Free from fall injury  Outcome: Progressing  Note: Patient remained free of falls during shift. Patient is a Ferrera Fall Risk: Medium (25-44); uses call light appropriately, ambulates with a steady gait and no assistive devices. Orthostatic blood pressures assessed and patient remains negative. Will continue to encourage ambulation and implement POC. Call light within reach and hourly rounding in place. Problem: ABCDS Injury Assessment  Goal: Absence of physical injury  Outcome: Progressing     Problem: Pain  Goal: Verbalizes/displays adequate comfort level or baseline comfort level  Outcome: Progressing  Note: Patient has no complaints of pain during shift. Will continue to assess comfort and pain on 0-10 number scale and medicate per order while encouraging non pharmacological techniques as well. Call light within reach and hourly rounding in place. Problem: Infection - Adult  Goal: Absence of infection during hospitalization  Outcome: Progressing  Note: Patient is showing no signs or symptoms of nosocomial infections. Patient's temp during shift are as follows: Temp (8hrs), Av.6 °F (36.4 °C), Min:97.6 °F (36.4 °C), Max:97.6 °F (36.4 °C)  . Patient placed in private room and instructed on importance of handwashing and when to wear a mask when ambulating in hallway. Will continue to assess for s/s of infection and implement POC. Call light within reach and hourly rounding in place. Problem: Metabolic/Fluid and Electrolytes - Adult  Goal: Electrolytes maintained within normal limits  Note: Electrolytes maintained within normal limits. Problem: Hematologic - Adult  Goal: Maintains hematologic stability  Note: Patient's hemoglobin this AM:   Recent Labs     22  0300   HGB 9.7*     Patient's platelet count this AM:   Recent Labs     22  0300       Thrombocytopenia Precautions in place. Patient showing no signs or symptoms of active bleeding. Transfusion not indicated at this time. Patient verbalizes understanding of all instructions. Will continue to assess and implement POC. Call light within reach and hourly rounding in place.

## 2022-06-11 NOTE — PROGRESS NOTES
Original chemotherapy orders reviewed and acknowledged. Appropriateness of chemotherapy treatment regimen for diagnosis of DLBC NHL was verified. Patient educated on chemotherapy regimen. Acknowledgement of informed consent for chemotherapy obtained. Estimated body surface area is 1.87 meters squared as calculated from the following:    Height as of this encounter: 5' 1\" (1.549 m). Weight as of this encounter: 180 lb (81.6 kg). verified. Appropriate dosing calculations of chemotherapy based on above height, weight, and BSA verified. Administration: Chemotherapy drug Cytarabine, etoposide independently verified with Dmitriy Humphreys RN prior to administration. Acknowledgement of informed consent for chemotherapy administration verified. Original order, appropriateness of regimen, drug supplied, height, weight, BSA, dose calculations, expiration dates/times, drug appearance, and two patient identifiers were verified by both RNs. Drug checked for vesicant/irritant status and for risk of hypersensitivity. Most recent laboratory values and allergies, were reviewed. Positive, brisk blood return via CVC was confirmed prior to administration. Chest x-ray for correct line placement reviewed. Natasha Chacon RN and Michelle Romero RN verified correct rate of chemotherapy and maintenance IV fluids. Patient was educated on chemotherapy regimen prior to administration including indication for treatment related to disease & side effects of chemotherapy drug. Patient verbalizes understanding of all instructions. Completion of Chemotherapy: Monitoring during infusion done per policy, see Flowsheets. Blood return verified before, during, and after infusion per policy; no signs of extravasation. Pt /tolerating chemotherapy well and without incident. Chemotherapy infusion end time on the [facility] Will continue to monitor.

## 2022-06-11 NOTE — PROGRESS NOTES
[facility] Progress Note    2022     Casandra Pascual    MRN: [id number]    : 1970    Referring MD: No referring provider defined for this encounter. SUBJECTIVE:  Patient is doing welI. Toll chemotx well. Up and about.     ECOG PS:  (1) Restricted in physically strenuous activity, ambulatory and able to do work of light nature    KPS: 80% Normal activity with effort; some signs or symptoms of disease    Isolation: None    Medications    Scheduled Meds:   sodium chloride flush  5-40 mL IntraVENous 2 times per day    Saline Mouthwash  15 mL Swish & Spit 4x Daily AC & HS    valACYclovir  500 mg Oral BID    [START ON 6/15/2022] fluconazole  400 mg Oral Daily    enoxaparin  40 mg SubCUTAneous Daily    allopurinol  300 mg Oral Daily    furosemide  40 mg IntraVENous Q12H    ondansetron  12 mg Oral BID    dexamethasone  10 mg Oral BID    cytarabine (CYTOSAR) 50 mg/mL chemo syringe  160 mg IntraVENous BID    etoposide (VEPESID) chemo IVPB  160 mg IntraVENous BID    [START ON 2022] ondansetron  24 mg Oral Once    [START ON 2022] dexamethasone  20 mg Oral Once    [START ON 2022] melphalan  225 mg IntraVENous Once    aspirin EC  81 mg Oral Daily    citalopram  20 mg Oral Daily    metoprolol succinate  25 mg Oral Daily    therapeutic multivitamin-minerals  1 tablet Oral Daily     Continuous Infusions:   sodium chloride 20 mL/hr at 06/10/22 1838    sodium chloride      dextrose 5 % and 0.45 % NaCl 150 mL/hr at 22 0139     PRN Meds:.diphenhydrAMINE, sodium chloride, sodium chloride flush, sodium chloride, potassium chloride, magnesium sulfate, magnesium hydroxide, Saline Mouthwash, alteplase (CATHFLO) with sterile water injection, prochlorperazine **OR** prochlorperazine, LORazepam **OR** LORazepam, busPIRone, diphenhydrAMINE, hydrOXYzine pamoate    ROS:  As noted above, otherwise remainder of 10-point ROS negative    Physical Exam:     I&O:      Intake/Output Summary (Last 24 hours) at 6/11/2022 0859  Last data filed at 6/11/2022 0830  Gross per 24 hour   Intake 7244.24 ml   Output 6600 ml   Net 644.24 ml       Vital Signs:  BP (!) 115/57   Pulse 91   Temp 98 °F (36.7 °C) (Oral)   Resp 18   Ht 5' 1\" (1.549 m)   Wt 180 lb (81.6 kg)   SpO2 97%   BMI 34.01 kg/m²     Weight:    Wt Readings from Last 3 Encounters:   06/11/22 180 lb (81.6 kg)   06/08/22 172 lb 12.8 oz (78.4 kg)   05/27/22 170 lb (77.1 kg)         General: Awake, alert and oriented. HEENT: normocephalic, PERRL, no scleral erythema or icterus, Oral mucosa moist and intact, throat clear  NECK: supple without palpable adenopathy  BACK: Straight negative CVAT  SKIN: warm dry and intact without lesions rashes or masses  CHEST: CTA bilaterally without use of accessory muscles  CV: Normal S1 S2, RRR, no MRG  ABD: NT ND normoactive BS, no palpable masses or hepatosplenomegaly  EXTREMITIES: without edema, denies calf tenderness  NEURO: CN II - XII grossly intact  CATHETER: rt upper chest wall heath intact at the insertion site     Data    CBC:   Recent Labs     06/09/22  1149 06/10/22  0250 06/11/22  0300   WBC 2.6* 4.4 6.5   HGB 10.5* 10.4* 9.7*   HCT 30.4* 30.7* 29.3*   MCV 99.7 102.1* 102.9*    144 171     BMP/Mag:  Recent Labs     06/09/22  1149 06/10/22  0250 06/11/22  0300    139 141   K 3.8 4.4 3.6    106 105   CO2 27 19* 21   PHOS 4.0 1.7* 3.0   BUN 11 9 9   CREATININE 0.6 0.7 0.5*   MG 2.00  --   --      LIVP:   Recent Labs     06/09/22  1149 06/10/22  0250   AST 64* 146*   ALT 81* 116*   BILIDIR <0.2 <0.2   BILITOT 0.5 0.3   ALKPHOS 201* 202*     Coags:   Recent Labs     06/09/22  1149   PROTIME 13.3   INR 1.02   APTT 27.4     Uric Acid   Recent Labs     06/09/22  1149 06/10/22  0250 06/11/22  0300   LABURIC 4.9 2.8 2.0*       PROBLEM LIST:             1. DLBC NHL  2. COVID19 PNA  3. Anxiety / H/o Panic Attacks  4. HLD  5. CAD  6.  Chronic systolic HF      TREATMENT:            1. R-CHOP x 6

## 2022-06-11 NOTE — ONCOLOGY
Administration: Chemotherapy drug CYTARABINE independently verified with Lalo Dueñas RN prior to administration. Acknowledgement of informed consent for chemotherapy administration verified. Original order, appropriateness of regimen, drug supplied, height, weight, BSA, dose calculations, expiration dates/times, drug appearance, and two patient identifiers were verified by both RNs. Drug checked for vesicant/irritant status and for risk of hypersensitivity. Most recent laboratory values and allergies, were reviewed. Positive, brisk blood return via CVC was confirmed prior to administration. Chest x-ray for correct line placement reviewed. Renetta Quiroz RN and Lalo Dueñas RN verified correct rate of chemotherapy and maintenance IV fluids. Patient was educated on chemotherapy regimen prior to administration including indication for treatment related to disease & side effects of chemotherapy drug. Patient verbalizes understanding of all instructions. Completion of Chemotherapy: Monitoring during infusion done per policy, see Flowsheets. Blood return verified before, during q 2ml and after infusion per policy; no signs of extravasation. Pt tolerated chemotherapy well and without incident which was slowly pushed over 10 min through free flowing IVF Chemotherapy infusion end time on the [facility] (3607). Will continue to monitor.

## 2022-06-11 NOTE — ONCOLOGY
Administration: Chemotherapy drug Etoposide independently verified with Meet Barrientos RN prior to administration. Acknowledgement of informed consent for chemotherapy administration verified. Original order, appropriateness of regimen, drug supplied, height, weight, BSA, dose calculations, expiration dates/times, drug appearance, and two patient identifiers were verified by both RNs. Drug checked for vesicant/irritant status and for risk of hypersensitivity. Most recent laboratory values and allergies, were reviewed. Positive, brisk blood return via CVC was confirmed prior to administration. Chest x-ray for correct line placement reviewed. Cande Avila RN and Meet Barrientos RN verified correct rate of chemotherapy and maintenance IV fluids. Patient was educated on chemotherapy regimen prior to administration including indication for treatment related to disease & side effects of chemotherapy drug. Patient verbalizes understanding of all instructions. Completion of Chemotherapy: Monitoring during infusion done per policy, see Flowsheets. Blood return verified before, during, and after infusion per policy; no signs of extravasation. Pt tolerated chemotherapy well and without incident. Chemotherapy infusion end time on the [facility](1013). Will continue to monitor.

## 2022-06-12 LAB
ANION GAP SERPL CALCULATED.3IONS-SCNC: 12 MMOL/L (ref 3–16)
BASOPHILS ABSOLUTE: 0 K/UL (ref 0–0.2)
BASOPHILS RELATIVE PERCENT: 0.2 %
BUN BLDV-MCNC: 9 MG/DL (ref 7–20)
CALCIUM SERPL-MCNC: 8.3 MG/DL (ref 8.3–10.6)
CHLORIDE BLD-SCNC: 111 MMOL/L (ref 99–110)
CO2: 19 MMOL/L (ref 21–32)
CREAT SERPL-MCNC: <0.5 MG/DL (ref 0.6–1.1)
EOSINOPHILS ABSOLUTE: 0 K/UL (ref 0–0.6)
EOSINOPHILS RELATIVE PERCENT: 0 %
GFR AFRICAN AMERICAN: >60
GFR NON-AFRICAN AMERICAN: >60
GLUCOSE BLD-MCNC: 139 MG/DL (ref 70–99)
HCT VFR BLD CALC: 27.1 % (ref 36–48)
HEMOGLOBIN: 9.3 G/DL (ref 12–16)
LYMPHOCYTES ABSOLUTE: 0.2 K/UL (ref 1–5.1)
LYMPHOCYTES RELATIVE PERCENT: 3.4 %
MCH RBC QN AUTO: 35 PG (ref 26–34)
MCHC RBC AUTO-ENTMCNC: 34.3 G/DL (ref 31–36)
MCV RBC AUTO: 102.3 FL (ref 80–100)
MONOCYTES ABSOLUTE: 0.1 K/UL (ref 0–1.3)
MONOCYTES RELATIVE PERCENT: 1.5 %
NEUTROPHILS ABSOLUTE: 4.5 K/UL (ref 1.7–7.7)
NEUTROPHILS RELATIVE PERCENT: 94.9 %
PDW BLD-RTO: 15.3 % (ref 12.4–15.4)
PHOSPHORUS: 2.5 MG/DL (ref 2.5–4.9)
PLATELET # BLD: 160 K/UL (ref 135–450)
PMV BLD AUTO: 8.5 FL (ref 5–10.5)
POTASSIUM SERPL-SCNC: 3.8 MMOL/L (ref 3.5–5.1)
RBC # BLD: 2.65 M/UL (ref 4–5.2)
SODIUM BLD-SCNC: 142 MMOL/L (ref 136–145)
URIC ACID, SERUM: 2.2 MG/DL (ref 2.6–6)
WBC # BLD: 4.7 K/UL (ref 4–11)

## 2022-06-12 PROCEDURE — 6360000002 HC RX W HCPCS: Performed by: INTERNAL MEDICINE

## 2022-06-12 PROCEDURE — 84100 ASSAY OF PHOSPHORUS: CPT

## 2022-06-12 PROCEDURE — 96413 CHEMO IV INFUSION 1 HR: CPT

## 2022-06-12 PROCEDURE — 36592 COLLECT BLOOD FROM PICC: CPT

## 2022-06-12 PROCEDURE — 80048 BASIC METABOLIC PNL TOTAL CA: CPT

## 2022-06-12 PROCEDURE — 6370000000 HC RX 637 (ALT 250 FOR IP): Performed by: NURSE PRACTITIONER

## 2022-06-12 PROCEDURE — 6360000002 HC RX W HCPCS: Performed by: NURSE PRACTITIONER

## 2022-06-12 PROCEDURE — 84550 ASSAY OF BLOOD/URIC ACID: CPT

## 2022-06-12 PROCEDURE — 2580000003 HC RX 258: Performed by: INTERNAL MEDICINE

## 2022-06-12 PROCEDURE — 85025 COMPLETE CBC W/AUTO DIFF WBC: CPT

## 2022-06-12 PROCEDURE — 96415 CHEMO IV INFUSION ADDL HR: CPT

## 2022-06-12 PROCEDURE — 2060000000 HC ICU INTERMEDIATE R&B

## 2022-06-12 PROCEDURE — 6370000000 HC RX 637 (ALT 250 FOR IP): Performed by: INTERNAL MEDICINE

## 2022-06-12 PROCEDURE — 2580000003 HC RX 258: Performed by: NURSE PRACTITIONER

## 2022-06-12 PROCEDURE — 96409 CHEMO IV PUSH SNGL DRUG: CPT

## 2022-06-12 RX ADMIN — CYTARABINE 160 MG: 100 INJECTION, SOLUTION INTRATHECAL; INTRAVENOUS; SUBCUTANEOUS at 21:35

## 2022-06-12 RX ADMIN — SODIUM CHLORIDE, PRESERVATIVE FREE 10 ML: 5 INJECTION INTRAVENOUS at 20:37

## 2022-06-12 RX ADMIN — ETOPOSIDE 160 MG: 20 INJECTION INTRAVENOUS at 12:13

## 2022-06-12 RX ADMIN — ETOPOSIDE 160 MG: 20 INJECTION INTRAVENOUS at 22:19

## 2022-06-12 RX ADMIN — VALACYCLOVIR HYDROCHLORIDE 500 MG: 500 TABLET, FILM COATED ORAL at 08:35

## 2022-06-12 RX ADMIN — MULTIPLE VITAMINS W/ MINERALS TAB 1 TABLET: TAB at 08:35

## 2022-06-12 RX ADMIN — ASPIRIN 81 MG: 81 TABLET, COATED ORAL at 20:37

## 2022-06-12 RX ADMIN — VALACYCLOVIR HYDROCHLORIDE 500 MG: 500 TABLET, FILM COATED ORAL at 20:36

## 2022-06-12 RX ADMIN — DEXTROSE AND SODIUM CHLORIDE: 5; 450 INJECTION, SOLUTION INTRAVENOUS at 03:02

## 2022-06-12 RX ADMIN — SODIUM CHLORIDE 15 ML: 900 IRRIGANT IRRIGATION at 20:37

## 2022-06-12 RX ADMIN — DEXAMETHASONE 10 MG: 4 TABLET ORAL at 20:37

## 2022-06-12 RX ADMIN — METOPROLOL SUCCINATE 25 MG: 25 TABLET, EXTENDED RELEASE ORAL at 20:37

## 2022-06-12 RX ADMIN — ENOXAPARIN SODIUM 40 MG: 100 INJECTION SUBCUTANEOUS at 17:36

## 2022-06-12 RX ADMIN — FUROSEMIDE 40 MG: 10 INJECTION, SOLUTION INTRAMUSCULAR; INTRAVENOUS at 17:36

## 2022-06-12 RX ADMIN — ONDANSETRON HYDROCHLORIDE 12 MG: 8 TABLET, FILM COATED ORAL at 20:36

## 2022-06-12 RX ADMIN — DEXAMETHASONE 10 MG: 4 TABLET ORAL at 10:29

## 2022-06-12 RX ADMIN — ONDANSETRON HYDROCHLORIDE 12 MG: 8 TABLET, FILM COATED ORAL at 10:28

## 2022-06-12 RX ADMIN — CYTARABINE 160 MG: 100 INJECTION, SOLUTION INTRATHECAL; INTRAVENOUS; SUBCUTANEOUS at 11:08

## 2022-06-12 RX ADMIN — CITALOPRAM HYDROBROMIDE 20 MG: 20 TABLET ORAL at 20:37

## 2022-06-12 RX ADMIN — ALLOPURINOL 300 MG: 300 TABLET ORAL at 08:35

## 2022-06-12 RX ADMIN — SODIUM CHLORIDE, PRESERVATIVE FREE 10 ML: 5 INJECTION INTRAVENOUS at 08:37

## 2022-06-12 RX ADMIN — PROCHLORPERAZINE MALEATE 10 MG: 10 TABLET ORAL at 15:44

## 2022-06-12 NOTE — PROGRESS NOTES
.        Administration: Chemotherapy drug ETOPOSIDE independently verified with Larry Wray RN prior to administration. Acknowledgement of informed consent for chemotherapy administration verified. Original order, appropriateness of regimen, drug supplied, height, weight, BSA, dose calculations, expiration dates/times, drug appearance, and two patient identifiers were verified by both RNs. Drug checked for vesicant/irritant status and for risk of hypersensitivity. Most recent laboratory values and allergies, were reviewed. Positive, brisk blood return via CVC was confirmed prior to administration. Chest x-ray for correct line placement reviewed. Marilee Riggins RN and Larry Wray RN verified correct rate of chemotherapy and maintenance IV fluids. Patient was educated on chemotherapy regimen prior to administration including indication for treatment related to disease & side effects of chemotherapy drug. Patient verbalizes understanding of all instructions. Completion of Chemotherapy: Monitoring during infusion done per policy, see Flowsheets. Blood return verified before, during, and after infusion per policy; no signs of extravasation. Pt tolerated chemotherapy well and without incident. Chemotherapy infusion end time on the [facility] (0100). Will continue to monitor.

## 2022-06-12 NOTE — PROGRESS NOTES
[address] Progress Note    2022     Gabriella Jacobson    MRN: [id number]    : 1970    Referring MD: No referring provider defined for this encounter. SUBJECTIVE:  Patient is doing welI. Toll chemotx well. Up and about.     ECOG PS:  (1) Restricted in physically strenuous activity, ambulatory and able to do work of light nature    KPS: 80% Normal activity with effort; some signs or symptoms of disease    Isolation: None    Medications    Scheduled Meds:   sodium chloride flush  5-40 mL IntraVENous 2 times per day    Saline Mouthwash  15 mL Swish & Spit 4x Daily AC & HS    valACYclovir  500 mg Oral BID    [START ON 6/15/2022] fluconazole  400 mg Oral Daily    enoxaparin  40 mg SubCUTAneous Daily    allopurinol  300 mg Oral Daily    furosemide  40 mg IntraVENous Q12H    ondansetron  12 mg Oral BID    dexamethasone  10 mg Oral BID    cytarabine (CYTOSAR) 50 mg/mL chemo syringe  160 mg IntraVENous BID    etoposide (VEPESID) chemo IVPB  160 mg IntraVENous BID    [START ON 2022] ondansetron  24 mg Oral Once    [START ON 2022] dexamethasone  20 mg Oral Once    [START ON 2022] melphalan  225 mg IntraVENous Once    aspirin EC  81 mg Oral Daily    citalopram  20 mg Oral Daily    metoprolol succinate  25 mg Oral Daily    therapeutic multivitamin-minerals  1 tablet Oral Daily     Continuous Infusions:   sodium chloride 20 mL/hr at 06/10/22 1838    sodium chloride      dextrose 5 % and 0.45 % NaCl 150 mL/hr at 22 0302     PRN Meds:.diphenhydrAMINE, sodium chloride, sodium chloride flush, sodium chloride, potassium chloride, magnesium sulfate, magnesium hydroxide, Saline Mouthwash, alteplase (CATHFLO) with sterile water injection, prochlorperazine **OR** prochlorperazine, LORazepam **OR** LORazepam, busPIRone, diphenhydrAMINE, hydrOXYzine pamoate    ROS:  As noted above, otherwise remainder of 10-point ROS negative    Physical Exam:     I&O:      Intake/Output Summary (Last 24 hours) at 6/12/2022 0854  Last data filed at 6/12/2022 0815  Gross per 24 hour   Intake 4622 ml   Output 4600 ml   Net 22 ml       Vital Signs:  /77   Pulse 79   Temp 97.7 °F (36.5 °C) (Oral)   Resp 18   Ht 5' 1\" (1.549 m)   Wt 184 lb 3.2 oz (83.6 kg)   SpO2 96%   BMI 34.80 kg/m²     Weight:    Wt Readings from Last 3 Encounters:   06/12/22 184 lb 3.2 oz (83.6 kg)   06/08/22 172 lb 12.8 oz (78.4 kg)   05/27/22 170 lb (77.1 kg)         General: Awake, alert and oriented. HEENT: normocephalic, PERRL, no scleral erythema or icterus, Oral mucosa moist and intact, throat clear  NECK: supple without palpable adenopathy  BACK: Straight negative CVAT  SKIN: warm dry and intact without lesions rashes or masses  CHEST: CTA bilaterally without use of accessory muscles  CV: Normal S1 S2, RRR, no MRG  ABD: NT ND normoactive BS, no palpable masses or hepatosplenomegaly  EXTREMITIES: without edema, denies calf tenderness  NEURO: CN II - XII grossly intact  CATHETER: rt upper chest wall heath intact at the insertion site     Data    CBC:   Recent Labs     06/10/22  0250 06/11/22  0300 06/12/22  0300   WBC 4.4 6.5 4.7   HGB 10.4* 9.7* 9.3*   HCT 30.7* 29.3* 27.1*   .1* 102.9* 102.3*    171 160     BMP/Mag:  Recent Labs     06/09/22  1149 06/09/22  1149 06/10/22  0250 06/11/22  0300 06/12/22  0300      < > 139 141 142   K 3.8   < > 4.4 3.6 3.8      < > 106 105 111*   CO2 27   < > 19* 21 19*   PHOS 4.0   < > 1.7* 3.0 2.5   BUN 11   < > 9 9 9   CREATININE 0.6   < > 0.7 0.5* <0.5*   MG 2.00  --   --   --   --     < > = values in this interval not displayed.      LIVP:   Recent Labs     06/09/22  1149 06/10/22  0250   AST 64* 146*   ALT 81* 116*   BILIDIR <0.2 <0.2   BILITOT 0.5 0.3   ALKPHOS 201* 202*     Coags:   Recent Labs     06/09/22  1149   PROTIME 13.3   INR 1.02   APTT 27.4     Uric Acid   Recent Labs     06/10/22  0250 06/11/22  0300 06/12/22  0300   LABURIC 2.8 2.0* 2.2* PROBLEM LIST:             1. [facility] NHL  2. COVID19 PNA  3. Anxiety / H/o Panic Attacks  4. HLD  5. CAD  6. Chronic systolic HF      TREATMENT:            1. R-CHOP x 6 cycles (2/21/21-6/4/21)  - Imbruvica added with cycle #2  2. R-ICE x2 cycles 3/28/22-4/19/22  3. BEAM followed by Auto SCT on 6/15/22       ASSESSMENT AND PLAN:           1. [facility] NHL: R/R, now in CR  - PET/CT 5/4/22: CR  - BM Bx/Asp 5/4/22: OMARI        Auto Day - 3     2. ID:  Afebrile, no evidence of infection.    - Start Valtrex ppx on admit; cont acyclovir at discharge for VZV positive titer.    - Start Diflucan ppx 6/15/22   - Start Levaquin ppx when [address] < 1.5     3. Heme: anemia r/t recent chemotherapy  - Transfuse for Hgb < 7 and Platelets < 53I  - No transfusion today     4. Metabolic: Stable  - Start IVFs: D5.45NS@ 150 cc/hr  - Replace potassium and magnesium per PRN orders     5. Cardiac: H/o CHF, CAD, HLD  - Cleared for transplant by Dr. Marek Mills  HLD:   - Does not tolerate statins  - Consider Leqvio after transplant per Dr. Marek Mills  CAD:   - Angiogram 2019 with borderline lesions but no PCI performed  - Cont ASA until Plts <95T  Chronic Systolic HF:   - Echo 9/8/41 w/EF 45-50%  - Stress Test 5/26/22: No evidence of ischemia or scar. LVEF (73%) & LV wall motion is normal  - Cont Toprol XL 25mg daily   - Will likely benefit from jardiance - cardiology to assess following BMT     6. Pulmonary:   - Pre-Txp PFTs: FEV1/% / DLCOcor 83%  - Encourage IS & ambulation      7. GI / Nutrition:  Appetite and oral intake is good. - Start low microbial diet   - Follow closely with dietary     8. Psych: H/o panic attacked (heart palpitations & chest discomfort) since 1988  - Cont buspar 15mg BID PRN  - Cont celexa 20mg nightly     9.  MSK:   - PT/OT consulted for prehabilitation / mobility program        - DVT Prophylaxis: Platelets >67,818 cells/dL, - daily lovenox prophylaxis ordered  Contraindications to pharmacologic prophylaxis: None  Contraindications to mechanical prophylaxis: None     - Disposition:  Once ANC >1 and recovered from toxicities of transplant     Vibha Joyner, APRN - CNP     Bess Mendoza.  [facility], [address] [facility]  [address] [facility]

## 2022-06-12 NOTE — PLAN OF CARE
Problem: Safety - Adult  Goal: Free from fall injury  Outcome: Progressing  Note: Patient remained free of falls during shift. Patient is a Ferrera Fall Risk: Medium (25-44); uses call light appropriately, ambulates with a steady gait and no assistive devices. Orthostatic blood pressures assessed and patient remains negative. Will continue to encourage ambulation and implement POC. Call light within reach and hourly rounding in place. Problem: ABCDS Injury Assessment  Goal: Absence of physical injury  Outcome: Progressing     Problem: Pain  Goal: Verbalizes/displays adequate comfort level or baseline comfort level  Outcome: Progressing  Note: Patient has no complaints of pain during shift. Will continue to assess comfort and pain on 0-10 number scale and medicate per order while encouraging non pharmacological techniques as well. Call light within reach and hourly rounding in place. Problem: Infection - Adult  Goal: Absence of infection during hospitalization  Outcome: Progressing  Note: Patient is showing no signs or symptoms of nosocomial infections. Patient's temp during shift are as follows: Temp (8hrs), Av.7 °F (36.5 °C), Min:97.5 °F (36.4 °C), Max:97.8 °F (36.6 °C)  . Patient placed in private room and instructed on importance of handwashing and when to wear a mask when ambulating in hallway. Will continue to assess for s/s of infection and implement POC. Call light within reach and hourly rounding in place. Problem: Infection - Adult  Goal: Absence of fever/infection during anticipated neutropenic period  Outcome: Progressing     Problem: Hematologic - Adult  Goal: Maintains hematologic stability  Note: Patient's hemoglobin this AM:   Recent Labs     22  0300   HGB 9.3*     Patient's platelet count this AM:   Recent Labs     22  0300       Thrombocytopenia not present at this time. Patient showing no signs or symptoms of active bleeding.   Transfusion not indicated at

## 2022-06-12 NOTE — PLAN OF CARE
Patient's hemoglobin this AM: Recent Labs     22  0300   HGB 9.3*     Patient's platelet count this AM:   Recent Labs     22  0300       Thrombocytopenia Precautions in place. Patient showing no signs or symptoms of active bleeding. Transfusion not indicated at this time. Patient verbalizes understanding of all instructions. Will continue to assess and implement POC. Call light within reach and hourly rounding in place. Problem: Safety - Adult  Goal: Free from fall injury  2022 1000 by Demetra Elizabeth RN  Flowsheets (Taken 2022 2577)  Free From Fall Injury: Instruct family/caregiver on patient safety  Note: Orthostatic vital signs obtained at start of shift - see flowsheet for details. Pt does not meet criteria for orthostasis. Pt is a Med fall risk. See Lonni Easter Fall Score and ABCDS Injury Risk assessments. - Screening for Orthostasis AND not a [place] Risk per ROSALES/ABCDS: Pt bed is in low position, side rails up, call light and belongings are in reach. Fall risk light is on outside pts room. Pt encouraged to call for assistance as needed. Will continue with hourly rounds for PO intake, pain needs, toileting and repositioning as needed. Problem: Pain  Goal: Verbalizes/displays adequate comfort level or baseline comfort level  2022 1000 by Demetra Elizabeth RN  Note: Client denies pain this shift     Problem: Infection - Adult  Goal: Absence of infection during hospitalization  2022 0809 by Amadou Dhillon RN  Outcome: Progressing  Flowsheets (Taken 2022 0700 by Demetra Elizabeth RN)  Absence of infection during hospitalization: Assess and monitor for signs and symptoms of infection  Note: Patient is showing no signs or symptoms of nosocomial infections. Patient's temp during shift are as follows: Temp (8hrs), Av.6 °F (36.4 °C), Min:97.6 °F (36.4 °C), Max:97.6 °F (36.4 °C)  .  Patient placed in private room and instructed on importance of handwashing and when to wear a mask when ambulating in hallway. Will continue to assess for s/s of infection and implement POC. Call light within reach and hourly rounding in place.        Problem: Metabolic/Fluid and Electrolytes - Adult  Goal: Electrolytes maintained within normal limits  6/11/2022 1000 by Christoph Joya RN  Flowsheets (Taken 6/11/2022 0700)  Electrolytes maintained within normal limits: Monitor labs and assess patient for signs and symptoms of electrolyte imbalances

## 2022-06-12 NOTE — PROGRESS NOTES
Original chemotherapy orders reviewed and acknowledged. Appropriateness of chemotherapy treatment regimen BEAM  for diagnosis DLBC NHL of  was verified. Patient educated on chemotherapy regimen. Acknowledgement of informed consent for chemotherapy obtained. Estimated body surface area is 1.9 meters squared as calculated from the following:    Height as of this encounter: 5' 1\" (1.549 m). Weight as of this encounter: 184 lb 3.2 oz (83.6 kg). verified. Appropriate dosing calculations of chemotherapy based on above height, weight, and BSA verified. Cytarabine, Etoposide, independently verified with Shandra Galvez RN prior to administration. Acknowledgement of informed consent for chemotherapy administration verified. Original order, appropriateness of regimen, drug supplied, height, weight, BSA, dose calculations, expiration dates/times, drug appearance, and two patient identifiers were verified by both RNs. Drug checked for vesicant/irritant status and for risk of hypersensitivity. Most recent laboratory values and allergies, were reviewed. Positive, brisk blood return via CVC was confirmed prior to administration. Chest x-ray for correct line placement reviewed. Tala Stein RN and Shandra Galvez  RN verified correct rate of chemotherapy and maintenance IV fluids. Patient was educated on chemotherapy regimen prior to administration including indication for treatment related to disease & side effects of chemotherapy drug. Patient verbalizes understanding of all instructions. Completion of Chemotherapy: Monitoring during infusion done per policy, see Flowsheets. Blood return verified before, during, and after infusion per policy; no signs of extravasation. Pt tolerating chemotherapy well and without incident. Chemotherapy infusion end time on the [facility]. Will continue to monitor.

## 2022-06-12 NOTE — PROGRESS NOTES
Administration: Chemotherapy drug CYTARABINE independently verified with Klaus Masterson RN prior to administration. Acknowledgement of informed consent for chemotherapy administration verified. Original order, appropriateness of regimen, drug supplied, height, weight, BSA, dose calculations, expiration dates/times, drug appearance, and two patient identifiers were verified by both RNs. Drug checked for vesicant/irritant status and for risk of hypersensitivity. Most recent laboratory values and allergies, were reviewed. Positive, brisk blood return via CVC was confirmed prior to administration. Chest x-ray for correct line placement reviewed. Tre Montana RN and Klaus Masterson RN verified correct rate of chemotherapy and maintenance IV fluids. Patient was educated on chemotherapy regimen prior to administration including indication for treatment related to disease & side effects of chemotherapy drug. Patient verbalizes understanding of all instructions. Completion of Chemotherapy: Monitoring during infusion done per policy, see Flowsheets. Blood return verified before, during q 2ml  and after infusion per policy; through fee flowing IV over 10 min slow ivp, no signs of extravasation. Pt tolerated chemotherapy well and without incident. Chemotherapy infusion end time on the [facility](6593). Will continue to monitor.

## 2022-06-13 ENCOUNTER — APPOINTMENT (OUTPATIENT)
Dept: GENERAL RADIOLOGY | Age: 52
DRG: 016 | End: 2022-06-13
Attending: INTERNAL MEDICINE
Payer: COMMERCIAL

## 2022-06-13 LAB
ALBUMIN SERPL-MCNC: 3.8 G/DL (ref 3.4–5)
ALP BLD-CCNC: 123 U/L (ref 40–129)
ALT SERPL-CCNC: 116 U/L (ref 10–40)
ANION GAP SERPL CALCULATED.3IONS-SCNC: 12 MMOL/L (ref 3–16)
APTT: 24.1 SEC (ref 23–34.3)
AST SERPL-CCNC: 94 U/L (ref 15–37)
BASOPHILS ABSOLUTE: 0 K/UL (ref 0–0.2)
BASOPHILS RELATIVE PERCENT: 0.1 %
BILIRUB SERPL-MCNC: 0.5 MG/DL (ref 0–1)
BILIRUBIN DIRECT: <0.2 MG/DL (ref 0–0.3)
BILIRUBIN URINE: NEGATIVE
BILIRUBIN, INDIRECT: ABNORMAL MG/DL (ref 0–1)
BLOOD, URINE: NEGATIVE
BUN BLDV-MCNC: 11 MG/DL (ref 7–20)
CALCIUM SERPL-MCNC: 8.5 MG/DL (ref 8.3–10.6)
CHLORIDE BLD-SCNC: 105 MMOL/L (ref 99–110)
CLARITY: CLEAR
CO2: 23 MMOL/L (ref 21–32)
COLOR: YELLOW
CREAT SERPL-MCNC: <0.5 MG/DL (ref 0.6–1.1)
EOSINOPHILS ABSOLUTE: 0 K/UL (ref 0–0.6)
EOSINOPHILS RELATIVE PERCENT: 0 %
GFR AFRICAN AMERICAN: >60
GFR NON-AFRICAN AMERICAN: >60
GLUCOSE BLD-MCNC: 147 MG/DL (ref 70–99)
GLUCOSE URINE: NEGATIVE MG/DL
HCT VFR BLD CALC: 26.6 % (ref 36–48)
HEMOGLOBIN: 8.9 G/DL (ref 12–16)
INR BLD: 1.08 (ref 0.87–1.14)
KETONES, URINE: NEGATIVE MG/DL
LACTATE DEHYDROGENASE: 199 U/L (ref 100–190)
LEUKOCYTE ESTERASE, URINE: NEGATIVE
LYMPHOCYTES ABSOLUTE: 0.1 K/UL (ref 1–5.1)
LYMPHOCYTES RELATIVE PERCENT: 3.8 %
MAGNESIUM: 1.9 MG/DL (ref 1.8–2.4)
MCH RBC QN AUTO: 34.5 PG (ref 26–34)
MCHC RBC AUTO-ENTMCNC: 33.6 G/DL (ref 31–36)
MCV RBC AUTO: 102.8 FL (ref 80–100)
MICROSCOPIC EXAMINATION: NORMAL
MONOCYTES ABSOLUTE: 0 K/UL (ref 0–1.3)
MONOCYTES RELATIVE PERCENT: 1.2 %
NEUTROPHILS ABSOLUTE: 2.3 K/UL (ref 1.7–7.7)
NEUTROPHILS RELATIVE PERCENT: 94.9 %
NITRITE, URINE: NEGATIVE
PDW BLD-RTO: 14.8 % (ref 12.4–15.4)
PH UA: 7 (ref 5–8)
PHOSPHORUS: 2.6 MG/DL (ref 2.5–4.9)
PLATELET # BLD: 159 K/UL (ref 135–450)
PMV BLD AUTO: 8.1 FL (ref 5–10.5)
POTASSIUM SERPL-SCNC: 3.4 MMOL/L (ref 3.5–5.1)
PROTEIN UA: NEGATIVE MG/DL
PROTHROMBIN TIME: 13.9 SEC (ref 11.7–14.5)
RBC # BLD: 2.59 M/UL (ref 4–5.2)
SODIUM BLD-SCNC: 140 MMOL/L (ref 136–145)
SPECIFIC GRAVITY UA: 1.01 (ref 1–1.03)
TOTAL PROTEIN: 5.5 G/DL (ref 6.4–8.2)
URIC ACID, SERUM: 2.4 MG/DL (ref 2.6–6)
URINE TYPE: NORMAL
UROBILINOGEN, URINE: 0.2 E.U./DL
WBC # BLD: 2.4 K/UL (ref 4–11)

## 2022-06-13 PROCEDURE — 96413 CHEMO IV INFUSION 1 HR: CPT

## 2022-06-13 PROCEDURE — 2580000003 HC RX 258: Performed by: INTERNAL MEDICINE

## 2022-06-13 PROCEDURE — 85610 PROTHROMBIN TIME: CPT

## 2022-06-13 PROCEDURE — 2580000003 HC RX 258: Performed by: NURSE PRACTITIONER

## 2022-06-13 PROCEDURE — 6370000000 HC RX 637 (ALT 250 FOR IP): Performed by: NURSE PRACTITIONER

## 2022-06-13 PROCEDURE — 97530 THERAPEUTIC ACTIVITIES: CPT

## 2022-06-13 PROCEDURE — 96415 CHEMO IV INFUSION ADDL HR: CPT

## 2022-06-13 PROCEDURE — 85730 THROMBOPLASTIN TIME PARTIAL: CPT

## 2022-06-13 PROCEDURE — 6360000002 HC RX W HCPCS: Performed by: NURSE PRACTITIONER

## 2022-06-13 PROCEDURE — 80076 HEPATIC FUNCTION PANEL: CPT

## 2022-06-13 PROCEDURE — 85025 COMPLETE CBC W/AUTO DIFF WBC: CPT

## 2022-06-13 PROCEDURE — 6370000000 HC RX 637 (ALT 250 FOR IP): Performed by: INTERNAL MEDICINE

## 2022-06-13 PROCEDURE — 36592 COLLECT BLOOD FROM PICC: CPT

## 2022-06-13 PROCEDURE — 71045 X-RAY EXAM CHEST 1 VIEW: CPT

## 2022-06-13 PROCEDURE — 97165 OT EVAL LOW COMPLEX 30 MIN: CPT

## 2022-06-13 PROCEDURE — 83615 LACTATE (LD) (LDH) ENZYME: CPT

## 2022-06-13 PROCEDURE — 2060000000 HC ICU INTERMEDIATE R&B

## 2022-06-13 PROCEDURE — 84100 ASSAY OF PHOSPHORUS: CPT

## 2022-06-13 PROCEDURE — 6360000002 HC RX W HCPCS: Performed by: INTERNAL MEDICINE

## 2022-06-13 PROCEDURE — 80048 BASIC METABOLIC PNL TOTAL CA: CPT

## 2022-06-13 PROCEDURE — 84550 ASSAY OF BLOOD/URIC ACID: CPT

## 2022-06-13 PROCEDURE — 83735 ASSAY OF MAGNESIUM: CPT

## 2022-06-13 PROCEDURE — 81003 URINALYSIS AUTO W/O SCOPE: CPT

## 2022-06-13 RX ADMIN — SODIUM CHLORIDE 15 ML: 900 IRRIGANT IRRIGATION at 08:25

## 2022-06-13 RX ADMIN — SODIUM CHLORIDE, PRESERVATIVE FREE 10 ML: 5 INJECTION INTRAVENOUS at 08:24

## 2022-06-13 RX ADMIN — POTASSIUM CHLORIDE 20 MEQ: 400 INJECTION, SOLUTION INTRAVENOUS at 06:20

## 2022-06-13 RX ADMIN — CITALOPRAM HYDROBROMIDE 20 MG: 20 TABLET ORAL at 21:56

## 2022-06-13 RX ADMIN — ETOPOSIDE 160 MG: 20 INJECTION INTRAVENOUS at 12:03

## 2022-06-13 RX ADMIN — POTASSIUM CHLORIDE 20 MEQ: 400 INJECTION, SOLUTION INTRAVENOUS at 08:23

## 2022-06-13 RX ADMIN — DEXTROSE AND SODIUM CHLORIDE: 5; 450 INJECTION, SOLUTION INTRAVENOUS at 19:30

## 2022-06-13 RX ADMIN — ENOXAPARIN SODIUM 40 MG: 100 INJECTION SUBCUTANEOUS at 18:40

## 2022-06-13 RX ADMIN — SODIUM CHLORIDE 15 ML: 900 IRRIGANT IRRIGATION at 10:35

## 2022-06-13 RX ADMIN — VALACYCLOVIR HYDROCHLORIDE 500 MG: 500 TABLET, FILM COATED ORAL at 08:24

## 2022-06-13 RX ADMIN — ETOPOSIDE 160 MG: 20 INJECTION INTRAVENOUS at 22:03

## 2022-06-13 RX ADMIN — CYTARABINE 160 MG: 100 INJECTION, SOLUTION INTRATHECAL; INTRAVENOUS; SUBCUTANEOUS at 21:08

## 2022-06-13 RX ADMIN — ONDANSETRON HYDROCHLORIDE 12 MG: 8 TABLET, FILM COATED ORAL at 20:31

## 2022-06-13 RX ADMIN — POTASSIUM CHLORIDE 20 MEQ: 400 INJECTION, SOLUTION INTRAVENOUS at 10:30

## 2022-06-13 RX ADMIN — VALACYCLOVIR HYDROCHLORIDE 500 MG: 500 TABLET, FILM COATED ORAL at 21:56

## 2022-06-13 RX ADMIN — METOPROLOL SUCCINATE 25 MG: 25 TABLET, EXTENDED RELEASE ORAL at 21:56

## 2022-06-13 RX ADMIN — SODIUM CHLORIDE, PRESERVATIVE FREE 10 ML: 5 INJECTION INTRAVENOUS at 22:05

## 2022-06-13 RX ADMIN — FUROSEMIDE 40 MG: 10 INJECTION, SOLUTION INTRAMUSCULAR; INTRAVENOUS at 18:40

## 2022-06-13 RX ADMIN — CYTARABINE 160 MG: 100 INJECTION, SOLUTION INTRATHECAL; INTRAVENOUS; SUBCUTANEOUS at 11:21

## 2022-06-13 RX ADMIN — DEXTROSE AND SODIUM CHLORIDE: 5; 450 INJECTION, SOLUTION INTRAVENOUS at 01:50

## 2022-06-13 RX ADMIN — DEXAMETHASONE 10 MG: 4 TABLET ORAL at 20:30

## 2022-06-13 RX ADMIN — POTASSIUM CHLORIDE 20 MEQ: 400 INJECTION, SOLUTION INTRAVENOUS at 12:16

## 2022-06-13 RX ADMIN — ASPIRIN 81 MG: 81 TABLET, COATED ORAL at 21:57

## 2022-06-13 RX ADMIN — ALLOPURINOL 300 MG: 300 TABLET ORAL at 08:24

## 2022-06-13 RX ADMIN — MULTIPLE VITAMINS W/ MINERALS TAB 1 TABLET: TAB at 08:24

## 2022-06-13 RX ADMIN — ONDANSETRON HYDROCHLORIDE 12 MG: 8 TABLET, FILM COATED ORAL at 10:35

## 2022-06-13 RX ADMIN — DEXAMETHASONE 10 MG: 4 TABLET ORAL at 10:35

## 2022-06-13 NOTE — PROGRESS NOTES
[facility] Progress Note    2022     Joe Mosqueda    MRN: [id number]    : 1970    Referring MD: No referring provider defined for this encounter.     SUBJECTIVE:  She feels well with no new constitutional symptoms     ECOG PS:  (1) Restricted in physically strenuous activity, ambulatory and able to do work of light nature    KPS: 80% Normal activity with effort; some signs or symptoms of disease    Isolation: None    Medications    Scheduled Meds:   sodium chloride flush  5-40 mL IntraVENous 2 times per day    Saline Mouthwash  15 mL Swish & Spit 4x Daily AC & HS    valACYclovir  500 mg Oral BID    [START ON 6/15/2022] fluconazole  400 mg Oral Daily    enoxaparin  40 mg SubCUTAneous Daily    allopurinol  300 mg Oral Daily    furosemide  40 mg IntraVENous Q12H    ondansetron  12 mg Oral BID    dexamethasone  10 mg Oral BID    cytarabine (CYTOSAR) 50 mg/mL chemo syringe  160 mg IntraVENous BID    etoposide (VEPESID) chemo IVPB  160 mg IntraVENous BID    [START ON 2022] ondansetron  24 mg Oral Once    [START ON 2022] dexamethasone  20 mg Oral Once    [START ON 2022] melphalan  225 mg IntraVENous Once    aspirin EC  81 mg Oral Daily    citalopram  20 mg Oral Daily    metoprolol succinate  25 mg Oral Daily    therapeutic multivitamin-minerals  1 tablet Oral Daily     Continuous Infusions:   sodium chloride 20 mL/hr at 06/10/22 1838    sodium chloride      dextrose 5 % and 0.45 % NaCl 150 mL/hr at 22 0150     PRN Meds:.diphenhydrAMINE, sodium chloride, sodium chloride flush, sodium chloride, potassium chloride, magnesium sulfate, magnesium hydroxide, Saline Mouthwash, alteplase (CATHFLO) with sterile water injection, prochlorperazine **OR** prochlorperazine, LORazepam **OR** LORazepam, busPIRone, diphenhydrAMINE, hydrOXYzine pamoate    ROS:  As noted above, otherwise remainder of 10-point ROS negative    Physical Exam:     I&O:      Intake/Output Summary (Last 24 hours) at 6/13/2022 0925  Last data filed at 6/13/2022 0825  Gross per 24 hour   Intake 6443 ml   Output 5600 ml   Net 843 ml       Vital Signs:  /82   Pulse 84   Temp 97.5 °F (36.4 °C) (Oral)   Resp 16   Ht 5' 1\" (1.549 m)   Wt 185 lb (83.9 kg)   SpO2 98%   BMI 34.96 kg/m²     Weight:    Wt Readings from Last 3 Encounters:   06/13/22 185 lb (83.9 kg)   06/08/22 172 lb 12.8 oz (78.4 kg)   05/27/22 170 lb (77.1 kg)         General: Awake, alert and oriented. HEENT: normocephalic, PERRL, no scleral erythema or icterus, Oral mucosa moist and intact, throat clear  NECK: supple without palpable adenopathy  BACK: Straight negative CVAT  SKIN: warm dry and intact without lesions rashes or masses  CHEST: CTA bilaterally without use of accessory muscles  CV: Normal S1 S2, RRR, no MRG  ABD: NT ND normoactive BS, no palpable masses or hepatosplenomegaly  EXTREMITIES: without edema, denies calf tenderness  NEURO: CN II - XII grossly intact  CATHETER: rt upper chest wall heath intact at the insertion site     Data    CBC:   Recent Labs     06/11/22 0300 06/12/22 0300 06/13/22 0338   WBC 6.5 4.7 2.4*   HGB 9.7* 9.3* 8.9*   HCT 29.3* 27.1* 26.6*   .9* 102.3* 102.8*    160 159     BMP/Mag:  Recent Labs     06/11/22 0300 06/12/22 0300 06/13/22  0338    142 140   K 3.6 3.8 3.4*    111* 105   CO2 21 19* 23   PHOS 3.0 2.5 2.6   BUN 9 9 11   CREATININE 0.5* <0.5* <0.5*   MG  --   --  1.90     LIVP:   Recent Labs     06/13/22 0338   AST 94*   *   BILIDIR <0.2   BILITOT 0.5   ALKPHOS 123     Coags:   Recent Labs     06/13/22 0338   PROTIME 13.9   INR 1.08   APTT 24.1     Uric Acid   Recent Labs     06/11/22  0300 06/12/22  0300 06/13/22  0338   LABURIC 2.0* 2.2* 2.4*       PROBLEM LIST:             1. DLBC NHL  2. COVID19 PNA  3. Anxiety / H/o Panic Attacks  4. HLD  5. CAD  6.  Chronic systolic HF      TREATMENT:            1. R-CHOP x 6 cycles (2/21/21-6/4/21)  - Imbruvica added with cycle #2  2. R-ICE x2 cycles 3/28/22-4/19/22  3. BEAM followed by Auto SCT on 6/15/22    ASSESSMENT AND PLAN:           1. DLBC NHL: R/R, now in CR  - PET/CT 5/4/22: CR  - BM Bx/Asp 5/4/22: OMARI     Auto Day - 2     2. ID:  Afebrile, no evidence of infection.    - Cont Valtrex ppx; cont acyclovir at discharge for VZV positive titer.    - Start Diflucan ppx 6/15/22   - Start Levaquin ppx when [address] < 1.5     3. Heme: anemia r/t recent chemotherapy  - Transfuse for Hgb < 7 and Platelets < 94G  - No transfusion today     4. Metabolic: Stable  - Cont IVFs: D51/2 at 150 mL/hr  - Replace potassium and magnesium per PRN orders     5. Cardiac: H/o CHF, CAD, HLD  - Cleared for transplant by Dr. Joel Wynne  HLD:   - Does not tolerate statins  - Consider Leqvio after transplant per Dr. Joel Wynne  CAD:   - Angiogram 2019 with borderline lesions but no PCI performed  - Cont ASA until Plts <58U  Chronic Systolic HF:   - Echo 0/2/09 w/EF 45-50%  - Stress Test 5/26/22: No evidence of ischemia or scar. LVEF (73%) & LV wall motion is normal  - Cont Toprol XL 25mg daily   - Will likely benefit from jardiance - cardiology to assess following BMT     6. Pulmonary:   - Pre-Txp PFTs: FEV1/% / DLCOcor 83%  - Encourage IS & ambulation      7. GI / Nutrition:  Appetite and oral intake is good. - Cont low microbial diet   - Follow closely with dietary     8. Psych: H/o panic attacked (heart palpitations & chest discomfort) since 1988  - Cont buspar 15mg BID PRN  - Cont celexa 20mg nightly     9.  MSK:   - PT/OT consulted for prehabilitation / mobility program     - DVT Prophylaxis: Platelets >99,693 cells/dL, - daily lovenox prophylaxis ordered  Contraindications to pharmacologic prophylaxis: None  Contraindications to mechanical prophylaxis: None     - Disposition:  Once ANC >1 and recovered from toxicities of transplant     ELEAZAR Fleming - CNP     Elle Mendieta MD

## 2022-06-13 NOTE — PROGRESS NOTES
Behavioral Health Intervention Note    Attended clinical rounds with treatment team. Patient reports that she is in good spirits and ready for autologous stem cell transplant. She states that she tries to stay busy and start off her mornings early. She denied behavioral health needs today. Interventions: Assessment of current needs    --------------------------------------------------------------------------------------------    At initial encounter with patient, discussed role of psychologist including addressing emotional and behavioral needs while receiving care at the [facility] and [place] [address]. Discussed short-term nature of services. Also discussed with patient that a component of provider's role is completing the pre-surgical psychological evaluations for bone marrow transplant and CAR-T. Informed patient of psychologist's various roles in order for the patient to be fully informed when consenting to behavioral health treatment. Patient was agreeable to engaging in care with psychologist. Discussed limits of confidentiality including that psychologist coordinates with patient's treatment team and other limits of confidentiality were discussed as needed.

## 2022-06-13 NOTE — PROGRESS NOTES
Occupational Therapy  Facility/Department: [facility]  Occupational Therapy Initial Assessment for [facility] Mobility Program    Name: Richard Dowling  : 1970  MRN: [id number]  Date of Service: 2022    Discharge Recommendations:  Richard Dowling scored a 24/24 on the AM-PAC ADL Inpatient form. Current research shows that an AM-PAC score of 18 or greater is typically associated with a discharge to the patient's home setting. If patient discharges prior to next session this note will serve as a discharge summary. Please see below for the latest assessment towards goals. OT Equipment Recommendations  Equipment Needed: No       Patient Diagnosis(es): There were no encounter diagnoses. Past Medical History:  has a past medical history of Hyperlipidemia, Hypertension, and NSTEMI (non-ST elevated myocardial infarction) ([facility] [address] Past Surgical History:  has a past surgical history that includes Carpal tunnel release;  section; Tubal ligation; Lithotripsy; and IR TUNNELED CVC PLACE WO SQ PORT/PUMP > 5 YEARS (2022). Assessment   Assessment: Pt from home admitted for transplant, IND and active at baseline. Pt seen for [facility] mobility program. Therapy initiated to educate patient on importance of mobility, completion of ADLs, daily exercises and activity log to promote functional mobility and performance w/ ADLs throughout hospitalization. Pt verbalized understanding of all education provided. Will continue to monitor 1x/week throughout hospitalization BCC mobility program. Pt planning to d/c home - can arrange 24 hr support if needed.   Prognosis: Good  Decision Making: Low Complexity  REQUIRES OT FOLLOW-UP: Yes  Activity Tolerance  Activity Tolerance: Patient Tolerated treatment well        Plan   Plan  Times per Week: 1x per week  Current Treatment Recommendations: Strengthening,Balance training,Functional mobility training,Endurance training,Safety education & training,Self-Care / ADL,Home management training     Restrictions  Position Activity Restriction  Other position/activity restrictions: up as tolerated    Subjective   General  Chart Reviewed: Yes  Patient assessed for rehabilitation services?: Yes  Additional Pertinent Hx: 45 y.o. F being admitted 6/9 for chemotherapy followed by Auto transplant. PMH: CAD, HLD, Chronic HFrEF, & anxiety  Family / Caregiver Present: No  Diagnosis: Diffuse Large B-Cell Lymphoma    Subjective  Subjective: In bed on arrival. Reports has been up ad dana and performing ADLs independently. Pain: pt denies     Social/Functional History  Social/Functional History  Lives With: Alone  Type of Home: House  Home Layout: One level  Home Access: Level entry  Bathroom Shower/Tub: Tub/Shower unit  Bathroom Toilet: Standard  Bathroom Equipment:  (none)  Bathroom Accessibility: Accessible  Has the patient had two or more falls in the past year or any fall with injury in the past year?: No  ADL Assistance: Independent  Homemaking Assistance: Independent  Ambulation Assistance: Independent  Transfer Assistance: Independent  Active : Yes  Occupation: Full time employment  Type of Occupation: hospice nurse aide  Additional Comments: pt has 24-hr assistance available if needed       Objective     Vision: WFL  Hearing: Within functional limits             Safety Devices  Type of Devices: Call light within reach; Left in bed (up ad dana)     Balance  Sitting: Intact  Standing: Intact    Gait  Overall Level of Assistance: Independent       AROM: Within functional limits  Strength: Within functional limits  Coordination: Within functional limits       ADL  LE Dressing: Independent  Toileting: Independent              Transfers  Sit to stand: Independent  Stand to sit:  Independent        Cognition  Overall Cognitive Status: WNL                     Education Given To: Patient  Education Provided: Role of Therapy;Plan of Care;Home Exercise Program  Education Provided Comments: education provided regarding BCC mobility program and activity log  Education Method: Verbal  Barriers to Learning: None  Education Outcome: Verbalized understanding                     Pt seen by OT for eval and treat. Treatment included: pt education                                                        AM-PAC Score        AM-PAC Inpatient Daily Activity Raw Score: 24 (06/13/22 1057)  AM-PAC Inpatient ADL T-Scale Score : 57.54 (06/13/22 1057)  ADL Inpatient CMS 0-100% Score: 0 (06/13/22 1057)  ADL Inpatient CMS G-Code Modifier : [address] (06/13/22 1057)    Goals  Short Term Goals  Time Frame for Short term goals: Discharge  Short Term Goal 1: pt will maintain functional independence with ADLs during hospital stay  Short Term Goal 2: pt will verbalize / demonstrate independence with self report activity log and HEP  Patient Goals   Patient goals : Go home. Be independent.        Therapy Time   Individual Concurrent Group Co-treatment   Time In 0830         Time Out 0853         Minutes 23           Timed Code Treatment Minutes:   8    Total Treatment Minutes:  [address] OTR/L #[id number]

## 2022-06-13 NOTE — PROGRESS NOTES
Physical Therapy  Mobility Program    Came to see pt for weekly monitoring of mobility program, including gait and exercises. Pt independent with her mobility and performing exercises. Pt getting up to chair and bathroom independently. Will return next week for monitoring.   [facility]

## 2022-06-13 NOTE — PROGRESS NOTES
Administration: Chemotherapy drug ETOPOSIDE independently verified with Meet Barrientos RN prior to administration. Acknowledgement of informed consent for chemotherapy administration verified. Original order, appropriateness of regimen, drug supplied, height, weight, BSA, dose calculations, expiration dates/times, drug appearance, and two patient identifiers were verified by both RNs. Drug checked for vesicant/irritant status and for risk of hypersensitivity. Most recent laboratory values and allergies, were reviewed. Positive, brisk blood return via CVC was confirmed prior to administration. Chest x-ray for correct line placement reviewed. Cande Avila RN and Meet Barrientos RN verified correct rate of chemotherapy and maintenance IV fluids. Patient was educated on chemotherapy regimen prior to administration including indication for treatment related to disease & side effects of chemotherapy drug. Patient verbalizes understanding of all instructions. Completion of Chemotherapy: Monitoring during infusion done per policy, see Flowsheets. Blood return verified before, during, and after infusion per policy; no signs of extravasation. Pt tolerated chemotherapy well and without incident. Chemotherapy infusion end time on the MAR(0030). Will continue to monitor.

## 2022-06-13 NOTE — PROGRESS NOTES
Administration: Chemotherapy drug CYTARABINE independently verified with Ananda Tompkins RN prior to administration. Acknowledgement of informed consent for chemotherapy administration verified. Original order, appropriateness of regimen, drug supplied, height, weight, BSA, dose calculations, expiration dates/times, drug appearance, and two patient identifiers were verified by both RNs. Drug checked for vesicant/irritant status and for risk of hypersensitivity. Most recent laboratory values and allergies, were reviewed. Positive, brisk blood return via CVC was confirmed prior to administration. Chest x-ray for correct line placement reviewed. Lexy Long RN and Ananda Tompkins RN verified correct rate of chemotherapy and maintenance IV fluids. Patient was educated on chemotherapy regimen prior to administration including indication for treatment related to disease & side effects of chemotherapy drug. Patient verbalizes understanding of all instructions. Completion of Chemotherapy: Monitoring during infusion done per policy, see Flowsheets. Blood return verified before, during q 2ml during slow ivp over 10 minuyes, and after infusion per policy; no signs of extravasation. Pt tolerated chemotherapy well and without incident. Chemotherapy infusion end time on the [facility] (2649). Will continue to monitor.

## 2022-06-13 NOTE — PLAN OF CARE
Problem: ABCDS Injury Assessment  Goal: Absence of physical injury  Outcome: Progressing     Orthostatic vital signs obtained at start of shift - see flowsheet for details. Pt does not meet criteria for orthostasis. Pt is a Med fall risk. See Marin Diver Fall Score and ABCDS Injury Risk assessments. - Screening for Orthostasis AND not a [place] Risk per ROSALES/ABCDS: Pt bed is in low position, side rails up, call light and belongings are in reach. Fall risk light is on outside pts room. Pt encouraged to call for assistance as needed. Will continue with hourly rounds for PO intake, pain needs, toileting and repositioning as needed. Problem: Infection - Adult  Goal: Absence of infection during hospitalization  Outcome: Progressing     CVC site remains free of signs/symptoms of infection. No drainage, edema, erythema, pain, or warmth noted at site. Dressing changes continue per protocol and on an as needed basis - see flowsheet. Compliant with BCC Bath Protocol:  Performed CHG bath today per [facility] protocol utilizing CHG solution in the shower. CVC site cleansed with CHG wipe over dressing, skin surrounding dressing, and first 6\" of IV tubing. Pt tolerated well. Continued to encourage daily CHG bathing per [facility] protocol. Problem: Metabolic/Fluid and Electrolytes - Adult  Goal: Hemodynamic stability and optimal renal function maintained  Outcome: Progressing     Electrolytes replaced per orders this shift. Problem: Hematologic - Adult  Goal: Maintains hematologic stability  Outcome: Progressing   Patient's hemoglobin this AM:   Recent Labs     06/13/22  0338   HGB 8.9*     Patient's platelet count this AM:   Recent Labs     06/13/22  0338       Thrombocytopenia Precautions in place. Patient showing no signs or symptoms of active bleeding. Transfusion not indicated at this time. Patient verbalizes understanding of all instructions. Will continue to assess and implement POC.  Call light within reach and hourly rounding in place.

## 2022-06-13 NOTE — PROGRESS NOTES
Administration: Chemotherapy drug cytarabine independently verified with Manjit Samayoa RN prior to administration. Acknowledgement of informed consent for chemotherapy administration verified. Original order, appropriateness of regimen, drug supplied, height, weight, BSA, dose calculations, expiration dates/times, drug appearance, and two patient identifiers were verified by both RNs. Drug checked for vesicant/irritant status and for risk of hypersensitivity. Most recent laboratory values and allergies, were reviewed. Positive, brisk blood return via CVC was confirmed prior to administration. Chest x-ray for correct line placement reviewed. Hoda Ornelas RN and Manjit Samayoa RN verified correct rate of chemotherapy and maintenance IV fluids. Patient was educated on chemotherapy regimen prior to administration including indication for treatment related to disease & side effects of chemotherapy drug. Patient verbalizes understanding of all instructions. Completion of Chemotherapy: Monitoring during infusion done per policy, see Flowsheets. Blood return verified before, during, and after infusion per policy; no signs of extravasation. Pt tolerated chemotherapy well and without incident. Chemotherapy infusion end time on the [facility]. Will continue to monitor.

## 2022-06-13 NOTE — CARE COORDINATION
Autologous Transplant     Dx: Diffuse Large B Cell Lymphoma     Central Line:    5/27/22:  CVC Triple Lumen Right Subclavian    Hx:  CAD, HLD, Anxiety (history of panic attacks), COVID PNA    Treatment Plan:  BEAM   6/15/22:  Autologous Stem Cell       Update:Patient educated on stem cell transplant, counts dropping. Patient has been keeping busy and has brought snacks in to keep in her room. Patient encouraged to frequently wash her hands to prevent infection. Reviewed the use of Mouth Rinse and walking on the unit. Psychologist will be rounding on patient during inpatient stay. Education provided on cryotherapy with Melphalan infusion. Discharge Plan: when counts recover patient is eating drinking and walking and afebrile. Pending: Patient will be provided lodging after transplant SW aware.

## 2022-06-13 NOTE — CARE COORDINATION
Patient has been through the educational process for autologous bone marrow transplantation  preadmission teaching and preadmission physician office visit. Patient and caregiver verbalize understanding of treatment regimen, possible adverse events, length of stay, and risk and benefits of transplantation and are agreeable to proceed. Patient and caregiver have been given an opportunity to ask, and to have their questions answered to their satisfaction. Documentation for above education can be found in the Oncology Hematology [facility] office chart. Patient will receive Melphalan  per chemotherapy preparative regimen   as ordered on June 14,2022. Prior to initiating melphalan infusion, patient has been  educated on the use of cryotherapy to prevent/decrease severe oral mucositis. Patient verbalized understanding of procedure and will perform  cryotherapy as instructed for 15 minutes prior to infusion, throughout duration of infusion, and for 2 hours post infusion using ice chips .

## 2022-06-13 NOTE — PLAN OF CARE
Problem: Safety - Adult  Goal: Free from fall injury  Outcome: Progressing  Note: Patient remained free of falls during shift. Patient is a Ferrera Fall Risk: Medium (25-44); uses call light appropriately, ambulates with a steady gait and no assistive devices. Orthostatic blood pressures assessed and patient remains negative. Will continue to encourage ambulation and implement POC. Call light within reach and hourly rounding in place. Problem: ABCDS Injury Assessment  Goal: Absence of physical injury  Outcome: Progressing     Problem: Pain  Goal: Verbalizes/displays adequate comfort level or baseline comfort level  Outcome: Progressing  Note: Patient has no complaints of pain during shift. Will continue to assess comfort and pain on 0-10 number scale and medicate per order while encouraging non pharmacological techniques as well. Call light within reach and hourly rounding in place. Problem: Chronic Conditions and Co-morbidities  Goal: Patient's chronic conditions and co-morbidity symptoms are monitored and maintained or improved  Outcome: Progressing     Problem: Infection - Adult  Goal: Absence of infection during hospitalization  Outcome: Progressing  Note: Patient is showing no signs or symptoms of nosocomial infections. Patient's temp during shift are as follows: Temp (8hrs), Av.6 °F (36.4 °C), Min:97.5 °F (36.4 °C), Max:97.7 °F (36.5 °C)  . Patient placed in private room and instructed on importance of handwashing and when to wear a mask when ambulating in hallway. Will continue to assess for s/s of infection and implement POC. Call light within reach and hourly rounding in place.        Problem: Metabolic/Fluid and Electrolytes - Adult  Goal: Hemodynamic stability and optimal renal function maintained  Outcome: Progressing  Note: Patient's hemoglobin this AM:   Recent Labs     22  033   HGB 8.9*     Patient's platelet count this AM:   Recent Labs     22

## 2022-06-14 LAB
ANION GAP SERPL CALCULATED.3IONS-SCNC: 10 MMOL/L (ref 3–16)
BASOPHILS ABSOLUTE: 0 K/UL (ref 0–0.2)
BASOPHILS RELATIVE PERCENT: 0.1 %
BUN BLDV-MCNC: 15 MG/DL (ref 7–20)
CALCIUM SERPL-MCNC: 8.5 MG/DL (ref 8.3–10.6)
CHLORIDE BLD-SCNC: 105 MMOL/L (ref 99–110)
CO2: 25 MMOL/L (ref 21–32)
CREAT SERPL-MCNC: <0.5 MG/DL (ref 0.6–1.1)
EOSINOPHILS ABSOLUTE: 0 K/UL (ref 0–0.6)
EOSINOPHILS RELATIVE PERCENT: 0.1 %
GFR AFRICAN AMERICAN: >60
GFR NON-AFRICAN AMERICAN: >60
GLUCOSE BLD-MCNC: 135 MG/DL (ref 70–99)
HCT VFR BLD CALC: 27.1 % (ref 36–48)
HEMOGLOBIN: 9.2 G/DL (ref 12–16)
LYMPHOCYTES ABSOLUTE: 0.1 K/UL (ref 1–5.1)
LYMPHOCYTES RELATIVE PERCENT: 4.1 %
MCH RBC QN AUTO: 34.4 PG (ref 26–34)
MCHC RBC AUTO-ENTMCNC: 33.9 G/DL (ref 31–36)
MCV RBC AUTO: 101.6 FL (ref 80–100)
MONOCYTES ABSOLUTE: 0 K/UL (ref 0–1.3)
MONOCYTES RELATIVE PERCENT: 0.8 %
NEUTROPHILS ABSOLUTE: 2.4 K/UL (ref 1.7–7.7)
NEUTROPHILS RELATIVE PERCENT: 94.9 %
PDW BLD-RTO: 14.9 % (ref 12.4–15.4)
PHOSPHORUS: 2.5 MG/DL (ref 2.5–4.9)
PLATELET # BLD: 165 K/UL (ref 135–450)
PMV BLD AUTO: 8.2 FL (ref 5–10.5)
POTASSIUM SERPL-SCNC: 4.2 MMOL/L (ref 3.5–5.1)
RBC # BLD: 2.67 M/UL (ref 4–5.2)
SODIUM BLD-SCNC: 140 MMOL/L (ref 136–145)
URIC ACID, SERUM: 2.3 MG/DL (ref 2.6–6)
WBC # BLD: 2.5 K/UL (ref 4–11)

## 2022-06-14 PROCEDURE — 84100 ASSAY OF PHOSPHORUS: CPT

## 2022-06-14 PROCEDURE — 2580000003 HC RX 258: Performed by: NURSE PRACTITIONER

## 2022-06-14 PROCEDURE — 2060000000 HC ICU INTERMEDIATE R&B

## 2022-06-14 PROCEDURE — 6370000000 HC RX 637 (ALT 250 FOR IP): Performed by: NURSE PRACTITIONER

## 2022-06-14 PROCEDURE — 80048 BASIC METABOLIC PNL TOTAL CA: CPT

## 2022-06-14 PROCEDURE — 36592 COLLECT BLOOD FROM PICC: CPT

## 2022-06-14 PROCEDURE — 6370000000 HC RX 637 (ALT 250 FOR IP): Performed by: INTERNAL MEDICINE

## 2022-06-14 PROCEDURE — 6360000002 HC RX W HCPCS: Performed by: INTERNAL MEDICINE

## 2022-06-14 PROCEDURE — 94761 N-INVAS EAR/PLS OXIMETRY MLT: CPT

## 2022-06-14 PROCEDURE — 84550 ASSAY OF BLOOD/URIC ACID: CPT

## 2022-06-14 PROCEDURE — 96409 CHEMO IV PUSH SNGL DRUG: CPT

## 2022-06-14 PROCEDURE — 96415 CHEMO IV INFUSION ADDL HR: CPT

## 2022-06-14 PROCEDURE — 96413 CHEMO IV INFUSION 1 HR: CPT

## 2022-06-14 PROCEDURE — 6360000002 HC RX W HCPCS: Performed by: NURSE PRACTITIONER

## 2022-06-14 PROCEDURE — 85025 COMPLETE CBC W/AUTO DIFF WBC: CPT

## 2022-06-14 RX ADMIN — MULTIPLE VITAMINS W/ MINERALS TAB 1 TABLET: TAB at 09:38

## 2022-06-14 RX ADMIN — ENOXAPARIN SODIUM 40 MG: 100 INJECTION SUBCUTANEOUS at 17:46

## 2022-06-14 RX ADMIN — CITALOPRAM HYDROBROMIDE 20 MG: 20 TABLET ORAL at 20:11

## 2022-06-14 RX ADMIN — ALLOPURINOL 300 MG: 300 TABLET ORAL at 09:38

## 2022-06-14 RX ADMIN — FUROSEMIDE 40 MG: 10 INJECTION, SOLUTION INTRAMUSCULAR; INTRAVENOUS at 17:46

## 2022-06-14 RX ADMIN — SODIUM CHLORIDE, PRESERVATIVE FREE 10 ML: 5 INJECTION INTRAVENOUS at 20:11

## 2022-06-14 RX ADMIN — METOPROLOL SUCCINATE 25 MG: 25 TABLET, EXTENDED RELEASE ORAL at 20:11

## 2022-06-14 RX ADMIN — PROCHLORPERAZINE MALEATE 10 MG: 10 TABLET ORAL at 23:43

## 2022-06-14 RX ADMIN — ONDANSETRON HYDROCHLORIDE 24 MG: 8 TABLET, FILM COATED ORAL at 09:38

## 2022-06-14 RX ADMIN — VALACYCLOVIR HYDROCHLORIDE 500 MG: 500 TABLET, FILM COATED ORAL at 20:11

## 2022-06-14 RX ADMIN — VALACYCLOVIR HYDROCHLORIDE 500 MG: 500 TABLET, FILM COATED ORAL at 09:38

## 2022-06-14 RX ADMIN — ASPIRIN 81 MG: 81 TABLET, COATED ORAL at 20:11

## 2022-06-14 RX ADMIN — SODIUM CHLORIDE, PRESERVATIVE FREE 10 ML: 5 INJECTION INTRAVENOUS at 14:19

## 2022-06-14 RX ADMIN — MELPHALAN HYDROCHLORIDE 225 MG: KIT at 10:12

## 2022-06-14 RX ADMIN — DEXAMETHASONE 20 MG: 4 TABLET ORAL at 09:38

## 2022-06-14 RX ADMIN — DEXTROSE AND SODIUM CHLORIDE: 5; 450 INJECTION, SOLUTION INTRAVENOUS at 17:51

## 2022-06-14 NOTE — ONCOLOGY
Administration: Chemotherapy drug Melphalan independently verified with Lindsay Lincoln RN prior to administration. Acknowledgement of informed consent for chemotherapy administration verified. Original order, appropriateness of regimen, drug supplied, height, weight, BSA, dose calculations, expiration dates/times, drug appearance, and two patient identifiers were verified by both RNs. Drug checked for vesicant/irritant status and for risk of hypersensitivity. Most recent laboratory values and allergies, were reviewed. Positive, brisk blood return via CVC was confirmed prior to administration. Chest x-ray for correct line placement reviewed. Filomena Mueller RN and Lindsay Lincoln RN verified correct rate of chemotherapy and maintenance IV fluids. Patient was educated on chemotherapy regimen prior to administration including indication for treatment related to disease & side effects of chemotherapy drug. Patient verbalizes understanding of all instructions. Cryotherapy with ice chips and pop sicles. Vss. Completion of Chemotherapy: Monitoring during infusion done per policy, see Flowsheets. Blood return verified before, during, and after infusion per policy; no signs of extravasation. Pt {tolerated chemotherapy well and without incident. Chemotherapy infusion end time on the [facility]. Will continue to monitor.

## 2022-06-14 NOTE — PROGRESS NOTES
[address] [employer] [facility] Progress Note    2022     Joe Mosqueda    MRN: [id number]    : 1970    Referring MD: No referring provider defined for this encounter.     SUBJECTIVE: she feels well     ECOG PS:  (1) Restricted in physically strenuous activity, ambulatory and able to do work of light nature    KPS: 80% Normal activity with effort; some signs or symptoms of disease    Isolation: None    Medications    Scheduled Meds:   sodium chloride flush  5-40 mL IntraVENous 2 times per day    Saline Mouthwash  15 mL Swish & Spit 4x Daily AC & HS    valACYclovir  500 mg Oral BID    [START ON 6/15/2022] fluconazole  400 mg Oral Daily    enoxaparin  40 mg SubCUTAneous Daily    allopurinol  300 mg Oral Daily    furosemide  40 mg IntraVENous Q12H    ondansetron  24 mg Oral Once    dexamethasone  20 mg Oral Once    melphalan  225 mg IntraVENous Once    aspirin EC  81 mg Oral Daily    citalopram  20 mg Oral Daily    metoprolol succinate  25 mg Oral Daily    therapeutic multivitamin-minerals  1 tablet Oral Daily     Continuous Infusions:   sodium chloride 20 mL/hr at 06/10/22 1838    sodium chloride      dextrose 5 % and 0.45 % NaCl 100 mL/hr at 22 1930     PRN Meds:.diphenhydrAMINE, sodium chloride, sodium chloride flush, sodium chloride, potassium chloride, magnesium sulfate, magnesium hydroxide, Saline Mouthwash, alteplase (CATHFLO) with sterile water injection, prochlorperazine **OR** prochlorperazine, LORazepam **OR** LORazepam, busPIRone, diphenhydrAMINE, hydrOXYzine pamoate    ROS:  As noted above, otherwise remainder of 10-point ROS negative    Physical Exam:     I&O:      Intake/Output Summary (Last 24 hours) at 2022 0828  Last data filed at 2022 0815  Gross per 24 hour   Intake 6465 ml   Output 5300 ml   Net 1165 ml       Vital Signs:  /79   Pulse 72   Temp 97.7 °F (36.5 °C) (Oral)   Resp 18   Ht 5' 1\" (1.549 m)   Wt 185 lb (83.9 kg)   SpO2 98%   BMI 34.96 kg/m² Weight:    Wt Readings from Last 3 Encounters:   06/13/22 185 lb (83.9 kg)   06/08/22 172 lb 12.8 oz (78.4 kg)   05/27/22 170 lb (77.1 kg)         General: Awake, alert and oriented. HEENT: normocephalic, PERRL, no scleral erythema or icterus, Oral mucosa moist and intact, throat clear  NECK: supple  BACK: Straight   SKIN: warm dry and intact without lesions rashes or masses  CHEST: CTA bilaterally without use of accessory muscles  CV: Normal S1 S2, RRR, no MRG  ABD: NT ND normoactive BS, no palpable masses or hepatosplenomegaly  EXTREMITIES: without edema, denies calf tenderness  NEURO: CN II - XII grossly intact  CATHETER: rt upper chest wall heath intact at the insertion site     Data    CBC:   Recent Labs     06/12/22 0300 06/13/22 0338 06/14/22  0340   WBC 4.7 2.4* 2.5*   HGB 9.3* 8.9* 9.2*   HCT 27.1* 26.6* 27.1*   .3* 102.8* 101.6*    159 165     BMP/Mag:  Recent Labs     06/12/22 0300 06/13/22 0338 06/14/22  0340    140 140   K 3.8 3.4* 4.2   * 105 105   CO2 19* 23 25   PHOS 2.5 2.6 2.5   BUN 9 11 15   CREATININE <0.5* <0.5* <0.5*   MG  --  1.90  --      LIVP:   Recent Labs     06/13/22 0338   AST 94*   *   BILIDIR <0.2   BILITOT 0.5   ALKPHOS 123     Coags:   Recent Labs     06/13/22 0338   PROTIME 13.9   INR 1.08   APTT 24.1     Uric Acid   Recent Labs     06/12/22 0300 06/13/22 0338 06/14/22  0340   LABURIC 2.2* 2.4* 2.3*       PROBLEM LIST:             1. DLBC NHL  2. COVID19 PNA  3. Anxiety / H/o Panic Attacks  4. HLD  5. CAD  6. Chronic systolic HF      TREATMENT:            1. R-CHOP x 6 cycles (2/21/21-6/4/21)  - Imbruvica added with cycle #2  2. R-ICE x2 cycles 3/28/22-4/19/22  3. BEAM followed by Auto SCT on 6/15/22    ASSESSMENT AND PLAN:           1. DLBC NHL: R/R, now in CR  - PET/CT 5/4/22: CR  - BM Bx/Asp 5/4/22: OMARI     Auto Day - 1     2.  ID:  Afebrile, no evidence of infection.    - Cont Valtrex ppx; cont acyclovir at discharge for VZV positive titer.    - Start Diflucan ppx 6/15/22   - Start Levaquin ppx when [address] < 1.5     3. Heme: Leukopenia & anemia 2/2 chemotherapy  - Transfuse for Hgb < 7 and Platelets < 34L  - No transfusion today     4. Metabolic: Stable  - Cont IVFs: D51/2 at 100 mL/hr  - Replace potassium and magnesium per PRN orders     5. Cardiac: H/o CHF, CAD, HLD  - Cleared for transplant by Dr. Cosmo Valdes  HLD:   - Does not tolerate statins  - Consider Leqvio after transplant per Dr. Cosmo Valdes  CAD:   - Angiogram 2019 with borderline lesions but no PCI performed  - Cont ASA until Plts <20D  Chronic Systolic HF:   - Echo 5/9/21 w/EF 45-50%  - Stress Test 5/26/22: No evidence of ischemia or scar. LVEF (73%) & LV wall motion is normal  - Cont Toprol XL 25mg daily   - Will likely benefit from jardiance - cardiology to assess following BMT     6. Pulmonary:   - Pre-Txp PFTs: FEV1/% / DLCOcor 83%  - Encourage IS & ambulation      7. GI / Nutrition:  Appetite and oral intake is good. - Cont low microbial diet   - Follow closely with dietary     8. Psych: H/o panic attacked (heart palpitations & chest discomfort) since 1988  - Cont buspar 15mg BID PRN  - Cont celexa 20mg nightly     9.  MSK:   - PT/OT consulted for prehabilitation / mobility program     - DVT Prophylaxis: Platelets >37,730 cells/dL, - daily lovenox prophylaxis ordered  Contraindications to pharmacologic prophylaxis: None  Contraindications to mechanical prophylaxis: None     - Disposition:  Once ANC >1 and recovered from toxicities of transplant     Yaz Sutherland, APRN - CNP     Kirt Benjamin MD

## 2022-06-14 NOTE — PLAN OF CARE
Problem: Pain  Goal: Verbalizes/displays adequate comfort level or baseline comfort level  6/14/2022 1507 by Lorraine Morley RN  Note: Client denies pain this shift     Problem: Chronic Conditions and Co-morbidities  Goal: Patient's chronic conditions and co-morbidity symptoms are monitored and maintained or improved  Recent Flowsheet Documentation  Taken 6/14/2022 0815 by Roxana Cordero 34 - Patient's Chronic Conditions and Co-Morbidity Symptoms are Monitored and Maintained or Improved: Monitor and assess patient's chronic conditions and comorbid symptoms for stability, deterioration, or improvement     Problem: Infection - Adult  Goal: Absence of infection during hospitalization  Flowsheets (Taken 6/14/2022 0815)  Absence of infection during hospitalization: Assess and monitor for signs and symptoms of infection     Problem: Metabolic/Fluid and Electrolytes - Adult  Goal: Electrolytes maintained within normal limits  Recent Flowsheet Documentation  Taken 6/14/2022 0815 by Lorraine Morley RN  Electrolytes maintained within normal limits: Monitor labs and assess patient for signs and symptoms of electrolyte imbalances     Problem: Hematologic - Adult  Goal: Maintains hematologic stability  6/14/2022 1507 by Lorraine Morley RN  Note: Patient's hemoglobin this AM:   Recent Labs     06/14/22  0340   HGB 9.2*     Patient's platelet count this AM:   Recent Labs     06/14/22  0340       Thrombocytopenia Precautions in place. Patient showing no signs or symptoms of active bleeding. Transfusion not indicated at this time. Patient verbalizes understanding of all instructions. Will continue to assess and implement POC. Call light within reach and hourly rounding in place.        Problem: Safety - Adult  Goal: Free from fall injury  6/14/2022 1507 by Lorraine Morley RN  Flowsheets (Taken 6/14/2022 1504)  Free From Fall Injury: Instruct family/caregiver on patient safety  Note: Orthostatic vital signs obtained at start of shift - see flowsheet for details. Pt does not meet criteria for orthostasis. Pt is a Med fall risk. See Elvia Sand Fall Score and ABCDS Injury Risk assessments. - Screening for Orthostasis AND not a [place] Risk per ROSALES/ABCDS: Pt bed is in low position, side rails up, call light and belongings are in reach. Fall risk light is on outside pts room. Pt encouraged to call for assistance as needed. Will continue with hourly rounds for PO intake, pain needs, toileting and repositioning as needed. 6/14/2022 0402 by Margret Goldmann, RN  Outcome: Progressing  Note: Orthostatic vital signs obtained at start of shift - see flowsheet for details. Pt does not meet criteria for orthostasis. Pt is a Med fall risk. See Elvia Sand Fall Score and ABCDS Injury Risk assessments. - Screening for Orthostasis AND not a [place] Risk per ROSALES/ABCDS: Pt bed is in low position, side rails up, call light and belongings are in reach. Fall risk light is on outside pts room. Pt encouraged to call for assistance as needed. Will continue with hourly rounds for PO intake, pain needs, toileting and repositioning as needed.

## 2022-06-14 NOTE — CARE COORDINATION
Patient admitted to unit today for chemotherapy followed by Auto transplant. Patient has high scores on OTY=24/24, independent for PT. Patient lives alone in a one level house. Patient is a hospice nurse aide. Patient is able to get 24 hour assistance if needed when going home. CM will continue to follow patient until discharge.   Electronically signed by Wisam Brooks RN on 6/14/2022 at 2:05 PM

## 2022-06-14 NOTE — PLAN OF CARE
Problem: Safety - Adult  Goal: Free from fall injury  6/14/2022 0402 by Aislinn Sotomayor RN  Outcome: Progressing  Note: Orthostatic vital signs obtained at start of shift - see flowsheet for details. Pt does not meet criteria for orthostasis. Pt is a Med fall risk. See [facility] Fall Score and ABCDS Injury Risk assessments. - Screening for Orthostasis AND not a [place] Risk per ROSALES/ABCDS: Pt bed is in low position, side rails up, call light and belongings are in reach. Fall risk light is on outside pts room. Pt encouraged to call for assistance as needed. Will continue with hourly rounds for PO intake, pain needs, toileting and repositioning as needed. Problem: Pain  Goal: Verbalizes/displays adequate comfort level or baseline comfort level  6/14/2022 0402 by Aislinn Sotomayor RN  Outcome: Progressing  Flowsheets (Taken 6/14/2022 0402)  Verbalizes/displays adequate comfort level or baseline comfort level:   Encourage patient to monitor pain and request assistance   Assess pain using appropriate pain scale     Problem: Hematologic - Adult  Goal: Maintains hematologic stability  6/14/2022 0402 by Aislinn Sotomayor RN  Outcome: Progressing     Patient's hemoglobin this AM: Recent Labs     06/14/22  0340   HGB 9.2*     Patient's platelet count this AM:   Recent Labs     06/14/22  0340       Thrombocytopenia not present at this time. Patient showing no signs or symptoms of active bleeding. Transfusion not indicated at this time. Patient verbalizes understanding of all instructions. Will continue to assess and implement POC. Call light within reach and hourly rounding in place.

## 2022-06-14 NOTE — ONCOLOGY
Administration: Chemotherapy drug Cytarabine independently verified with Eladia Macias RN prior to administration. Acknowledgement of informed consent for chemotherapy administration verified. Original order, appropriateness of regimen, drug supplied, height, weight, BSA, dose calculations, expiration dates/times, drug appearance, and two patient identifiers were verified by both RNs. Drug checked for vesicant/irritant status and for risk of hypersensitivity. Most recent laboratory values and allergies, were reviewed. Positive, brisk blood return via CVC was confirmed prior to administration. Chest x-ray for correct line placement reviewed. Mike Gonzalez RN and Eladia Macias RN verified correct rate of chemotherapy and maintenance IV fluids. Patient was educated on chemotherapy regimen prior to administration including indication for treatment related to disease & side effects of chemotherapy drug. Patient verbalizes understanding of all instructions. Completion of Chemotherapy: Monitoring during infusion done per policy, see Flowsheets. Blood return verified before, during, and after infusion per policy; no signs of extravasation. Pt tolerated chemotherapy well and without incident. Chemotherapy infusion end time on the [facility] Will continue to monitor.

## 2022-06-15 LAB
ALBUMIN SERPL-MCNC: 3.7 G/DL (ref 3.4–5)
ALP BLD-CCNC: 112 U/L (ref 40–129)
ALT SERPL-CCNC: 122 U/L (ref 10–40)
ANION GAP SERPL CALCULATED.3IONS-SCNC: 12 MMOL/L (ref 3–16)
AST SERPL-CCNC: 47 U/L (ref 15–37)
BASOPHILS ABSOLUTE: 0 K/UL (ref 0–0.2)
BASOPHILS RELATIVE PERCENT: 1.1 %
BILIRUB SERPL-MCNC: 0.8 MG/DL (ref 0–1)
BILIRUBIN DIRECT: <0.2 MG/DL (ref 0–0.3)
BILIRUBIN, INDIRECT: ABNORMAL MG/DL (ref 0–1)
BUN BLDV-MCNC: 16 MG/DL (ref 7–20)
CALCIUM SERPL-MCNC: 8.5 MG/DL (ref 8.3–10.6)
CHLORIDE BLD-SCNC: 101 MMOL/L (ref 99–110)
CO2: 25 MMOL/L (ref 21–32)
CREAT SERPL-MCNC: <0.5 MG/DL (ref 0.6–1.1)
EOSINOPHILS ABSOLUTE: 0 K/UL (ref 0–0.6)
EOSINOPHILS RELATIVE PERCENT: 0 %
GFR AFRICAN AMERICAN: >60
GFR NON-AFRICAN AMERICAN: >60
GLUCOSE BLD-MCNC: 109 MG/DL (ref 70–99)
HCT VFR BLD CALC: 26.6 % (ref 36–48)
HEMOGLOBIN: 9.2 G/DL (ref 12–16)
LACTATE DEHYDROGENASE: 175 U/L (ref 100–190)
LYMPHOCYTES ABSOLUTE: 0.1 K/UL (ref 1–5.1)
LYMPHOCYTES RELATIVE PERCENT: 4.9 %
MCH RBC QN AUTO: 35 PG (ref 26–34)
MCHC RBC AUTO-ENTMCNC: 34.6 G/DL (ref 31–36)
MCV RBC AUTO: 101.2 FL (ref 80–100)
MONOCYTES ABSOLUTE: 0 K/UL (ref 0–1.3)
MONOCYTES RELATIVE PERCENT: 0.8 %
NEUTROPHILS ABSOLUTE: 1.9 K/UL (ref 1.7–7.7)
NEUTROPHILS RELATIVE PERCENT: 93.2 %
PDW BLD-RTO: 14.6 % (ref 12.4–15.4)
PHOSPHORUS: 3.2 MG/DL (ref 2.5–4.9)
PLATELET # BLD: 153 K/UL (ref 135–450)
PMV BLD AUTO: 8 FL (ref 5–10.5)
POTASSIUM SERPL-SCNC: 3.8 MMOL/L (ref 3.5–5.1)
RBC # BLD: 2.63 M/UL (ref 4–5.2)
SODIUM BLD-SCNC: 138 MMOL/L (ref 136–145)
TOTAL PROTEIN: 5.4 G/DL (ref 6.4–8.2)
URIC ACID, SERUM: 2.7 MG/DL (ref 2.6–6)
WBC # BLD: 2.1 K/UL (ref 4–11)

## 2022-06-15 PROCEDURE — 83615 LACTATE (LD) (LDH) ENZYME: CPT

## 2022-06-15 PROCEDURE — 2580000003 HC RX 258: Performed by: NURSE PRACTITIONER

## 2022-06-15 PROCEDURE — 6360000002 HC RX W HCPCS: Performed by: NURSE PRACTITIONER

## 2022-06-15 PROCEDURE — 38208 THAW PRESERVED STEM CELLS: CPT

## 2022-06-15 PROCEDURE — 2060000000 HC ICU INTERMEDIATE R&B

## 2022-06-15 PROCEDURE — 6370000000 HC RX 637 (ALT 250 FOR IP): Performed by: NURSE PRACTITIONER

## 2022-06-15 PROCEDURE — 36592 COLLECT BLOOD FROM PICC: CPT

## 2022-06-15 PROCEDURE — 6360000002 HC RX W HCPCS: Performed by: INTERNAL MEDICINE

## 2022-06-15 PROCEDURE — 80048 BASIC METABOLIC PNL TOTAL CA: CPT

## 2022-06-15 PROCEDURE — 84100 ASSAY OF PHOSPHORUS: CPT

## 2022-06-15 PROCEDURE — 30243Y0 TRANSFUSION OF AUTOLOGOUS HEMATOPOIETIC STEM CELLS INTO CENTRAL VEIN, PERCUTANEOUS APPROACH: ICD-10-PCS | Performed by: INTERNAL MEDICINE

## 2022-06-15 PROCEDURE — 94761 N-INVAS EAR/PLS OXIMETRY MLT: CPT

## 2022-06-15 PROCEDURE — 38241 TRANSPLT AUTOL HCT/DONOR: CPT

## 2022-06-15 PROCEDURE — 85025 COMPLETE CBC W/AUTO DIFF WBC: CPT

## 2022-06-15 PROCEDURE — 80076 HEPATIC FUNCTION PANEL: CPT

## 2022-06-15 PROCEDURE — 84550 ASSAY OF BLOOD/URIC ACID: CPT

## 2022-06-15 RX ORDER — MORPHINE SULFATE 2 MG/ML
2 INJECTION, SOLUTION INTRAMUSCULAR; INTRAVENOUS PRN
Status: COMPLETED | OUTPATIENT
Start: 2022-06-15 | End: 2022-06-21

## 2022-06-15 RX ORDER — ACETAMINOPHEN 325 MG/1
650 TABLET ORAL ONCE
Status: COMPLETED | OUTPATIENT
Start: 2022-06-15 | End: 2022-06-15

## 2022-06-15 RX ORDER — SODIUM CHLORIDE 9 MG/ML
250 INJECTION, SOLUTION INTRAVENOUS PRN
Status: ACTIVE | OUTPATIENT
Start: 2022-06-15 | End: 2022-06-16

## 2022-06-15 RX ORDER — DIPHENHYDRAMINE HYDROCHLORIDE 50 MG/ML
25 INJECTION INTRAMUSCULAR; INTRAVENOUS PRN
Status: DISPENSED | OUTPATIENT
Start: 2022-06-15 | End: 2022-06-16

## 2022-06-15 RX ORDER — DIPHENHYDRAMINE HCL 25 MG
25 TABLET ORAL ONCE
Status: COMPLETED | OUTPATIENT
Start: 2022-06-15 | End: 2022-06-15

## 2022-06-15 RX ORDER — SODIUM CHLORIDE 9 MG/ML
INJECTION, SOLUTION INTRAVENOUS CONTINUOUS PRN
Status: DISCONTINUED | OUTPATIENT
Start: 2022-06-15 | End: 2022-06-27 | Stop reason: HOSPADM

## 2022-06-15 RX ADMIN — MULTIPLE VITAMINS W/ MINERALS TAB 1 TABLET: TAB at 08:21

## 2022-06-15 RX ADMIN — VALACYCLOVIR HYDROCHLORIDE 500 MG: 500 TABLET, FILM COATED ORAL at 20:39

## 2022-06-15 RX ADMIN — DIPHENHYDRAMINE HYDROCHLORIDE 25 MG: 25 TABLET ORAL at 10:39

## 2022-06-15 RX ADMIN — SODIUM CHLORIDE 15 ML: 900 IRRIGANT IRRIGATION at 20:39

## 2022-06-15 RX ADMIN — ENOXAPARIN SODIUM 40 MG: 100 INJECTION SUBCUTANEOUS at 18:43

## 2022-06-15 RX ADMIN — SODIUM CHLORIDE, PRESERVATIVE FREE 10 ML: 5 INJECTION INTRAVENOUS at 08:21

## 2022-06-15 RX ADMIN — VALACYCLOVIR HYDROCHLORIDE 500 MG: 500 TABLET, FILM COATED ORAL at 08:21

## 2022-06-15 RX ADMIN — ASPIRIN 81 MG: 81 TABLET, COATED ORAL at 20:39

## 2022-06-15 RX ADMIN — HYDROCORTISONE SODIUM SUCCINATE 100 MG: 100 INJECTION, POWDER, FOR SOLUTION INTRAMUSCULAR; INTRAVENOUS at 10:39

## 2022-06-15 RX ADMIN — FLUCONAZOLE 400 MG: 200 TABLET ORAL at 08:21

## 2022-06-15 RX ADMIN — ACETAMINOPHEN 650 MG: 325 TABLET ORAL at 10:39

## 2022-06-15 RX ADMIN — DEXTROSE AND SODIUM CHLORIDE: 5; 450 INJECTION, SOLUTION INTRAVENOUS at 20:45

## 2022-06-15 RX ADMIN — CITALOPRAM HYDROBROMIDE 20 MG: 20 TABLET ORAL at 20:39

## 2022-06-15 RX ADMIN — DEXTROSE AND SODIUM CHLORIDE: 5; 450 INJECTION, SOLUTION INTRAVENOUS at 04:09

## 2022-06-15 RX ADMIN — SODIUM CHLORIDE, PRESERVATIVE FREE 10 ML: 5 INJECTION INTRAVENOUS at 20:40

## 2022-06-15 RX ADMIN — METOPROLOL SUCCINATE 25 MG: 25 TABLET, EXTENDED RELEASE ORAL at 20:39

## 2022-06-15 RX ADMIN — FUROSEMIDE 40 MG: 10 INJECTION, SOLUTION INTRAMUSCULAR; INTRAVENOUS at 18:43

## 2022-06-15 RX ADMIN — DEXTROSE AND SODIUM CHLORIDE: 5; 450 INJECTION, SOLUTION INTRAVENOUS at 16:47

## 2022-06-15 ASSESSMENT — PAIN SCALES - GENERAL: PAINLEVEL_OUTOF10: 0

## 2022-06-15 NOTE — PROCEDURES
Transplant (T0) Progress Note      Adarsh Star  Blood Type: O pos    06/15/2022   Start time:1129 Completion time 1217    Transplant Type: Autologous    Product Type: PBSC (peripheral blood stem cell)    Product Unit Number:   Lupe Pineda [id number] A0  W 9077 54 [id number] B0  W 1632 30 [id number] C0  W 0123 34 [id number] D0  W 2916 19 [id number] E0  W 9964 85 [id number] F0    Cell Count: 4.9 x 10^6    Volume: 420 mL      Product Manipulation:    Volume Reduction  No  Plasma Depletion  No  RBC Depletion  No    Catheter lumen used for infusion: R triple lumen trifusion red lumen               Positive Blood Return: Yes    Donor Name: self  Blood Type: O pos    Relationship: self  Donor Sex: Female    Premeds Given: Yes, tylenol and benadryl and solu- cortef    Adverse Reaction(s) and treatment: Baseline vital signs obtained prior to infusion and monitoring completed throughout per [employer] protocol - see flowsheets. All IVFs turned down to [facility] during stem cell infusion. Stem cell product(s) verified with 2nd RN Dai Pederson prior to infusion using 2 patient identifiers. Infused via gravity with rate controlled by Rio Pollard RN per [employer] protocols. Emergency medications epinephrine, benadryl, & hydrocortisone available as needed. Emergency medical equipment available as needed including telemetry monitoring throughout infusion, supplemental O2, suction equipment, and crash cart. RN at bedside throughout infusion.      Rio Pollard RN

## 2022-06-15 NOTE — FLOWSHEET NOTE
06/15/22 0950   Encounter Summary   Encounter Overview/Reason  Spiritual/Emotional Needs   Service Provided For: Patient   Last Encounter    (es 6/15)   Complexity of Encounter Moderate   Begin Time 0918   End Time  0940   Total Time Calculated 22 min   Encounter    Type Pre-Procedural  (pre-transplant)   Assessment/Intervention/Outcome   Assessment Coping; Hopeful;Peaceful   Intervention Active listening;Discussed belief system/Jainism practices/shaista;Discussed death, afterlife; Discussed illness injury and its impact; Explored/Affirmed feelings, thoughts, concerns;Prayer (assurance of)/[place]; Other (Comment)  (read from [employer] devotional)   Outcome Coping;Engaged in conversation;Expressed feelings, needs, and concerns;Receptive   Plan and Referrals   Plan/Referrals Other (Comment)  (will follow as needed)

## 2022-06-15 NOTE — CARE COORDINATION
Autologous Transplant     Dx: Diffuse Large B Cell Lymphoma     Central Line:    5/27/22:  CVC Triple Lumen Right Subclavian    Hx:  CAD, HLD, Anxiety (history of panic attacks), COVID PNA    Treatment Plan:  BEAM   6/15/22:  Autologous Stem Cell       Update:Patient educated on stem cell transplant, counts dropping. Patient has been keeping busy and has brought snacks in to keep in her room. Patient encouraged to frequently wash her hands to prevent infection. Reviewed the use of Mouth Rinse and walking on the unit. 6/15/22:  Patient received cells today. Checked on patient during transplant pt reports all is going well is she is feeling good. Reviewed transplant timeline with patient. Psychologist will be rounding on patient during inpatient stay. Education provided on cryotherapy with Melphalan infusion. Discharge Plan: when counts recover patient is eating drinking and walking and afebrile. Pending: Patient will be provided lodging after transplant SW aware.

## 2022-06-15 NOTE — PROGRESS NOTES
per 24 hour   Intake 5772 ml   Output 3150 ml   Net 2622 ml       Vital Signs:  /67   Pulse 94   Temp 97.7 °F (36.5 °C) (Oral)   Resp 18   Ht 5' 1\" (1.549 m)   Wt 182 lb 12.8 oz (82.9 kg)   SpO2 99%   BMI 34.54 kg/m²     Weight:    Wt Readings from Last 3 Encounters:   06/15/22 182 lb 12.8 oz (82.9 kg)   06/08/22 172 lb 12.8 oz (78.4 kg)   05/27/22 170 lb (77.1 kg)         General: Awake, alert and oriented. HEENT: normocephalic, PERRL, no scleral erythema or icterus, Oral mucosa moist and intact, throat clear  NECK: supple  BACK: Straight   SKIN: warm dry and intact without lesions rashes or masses  CHEST: CTA bilaterally without use of accessory muscles  CV: Normal S1 S2, RRR, no MRG  ABD: NT ND normoactive BS, no palpable masses or hepatosplenomegaly  EXTREMITIES: without edema, denies calf tenderness  NEURO: CN II - XII grossly intact  CATHETER: rt upper chest wall heath intact at the insertion site     Data    CBC:   Recent Labs     06/13/22 0338 06/14/22  0340 06/15/22  0415   WBC 2.4* 2.5* 2.1*   HGB 8.9* 9.2* 9.2*   HCT 26.6* 27.1* 26.6*   .8* 101.6* 101.2*    165 153     BMP/Mag:  Recent Labs     06/13/22 0338 06/14/22  0340 06/15/22  0415    140 138   K 3.4* 4.2 3.8    105 101   CO2 23 25 25   PHOS 2.6 2.5 3.2   BUN 11 15 16   CREATININE <0.5* <0.5* <0.5*   MG 1.90  --   --      LIVP:   Recent Labs     06/13/22 0338 06/15/22  0415   AST 94* 47*   * 122*   BILIDIR <0.2 <0.2   BILITOT 0.5 0.8   ALKPHOS 123 112     Coags:   Recent Labs     06/13/22 0338   PROTIME 13.9   INR 1.08   APTT 24.1     Uric Acid   Recent Labs     06/13/22  0338 06/14/22  0340 06/15/22  0415   LABURIC 2.4* 2.3* 2.7       PROBLEM LIST:             1. DLBC NHL  2. COVID19 PNA  3. Anxiety / H/o Panic Attacks  4. HLD  5. CAD  6. Chronic systolic HF      TREATMENT:            1. R-CHOP x 6 cycles (2/21/21-6/4/21)  - Imbruvica added with cycle #2  2. R-ICE x2 cycles 3/28/22-4/19/22  3. BEAM followed by Auto SCT on 6/15/22    ASSESSMENT AND PLAN:           1. [facility] NHL: R/R, now in CR  - PET/CT 5/4/22: CR  - BM Bx/Asp 5/4/22: OMARI     Auto Day 0     2. ID:  Afebrile, no evidence of infection.    - Cont Valtrex ppx; cont acyclovir at discharge for VZV positive titer.    - Start Diflucan ppx 6/15/22   - Start Levaquin ppx when [address] < 1.5     3. Heme: Leukopenia & anemia 2/2 chemotherapy  - Transfuse for Hgb < 7 and Platelets < 61I  - No transfusion today     4. Metabolic: Stable  - Cont IVFs: D51/2 at 100 mL/hr  - Replace potassium and magnesium per PRN orders     5. Cardiac: H/o CHF, CAD, HLD  - Cleared for transplant by Dr. Toney Carrington  HLD:   - Does not tolerate statins  - Consider Leqvio after transplant per Dr. Toney Carrington  CAD:   - Angiogram 2019 with borderline lesions but no PCI performed  - Cont ASA until Plts <64W  Chronic Systolic HF:   - Echo 6/5/26 w/EF 45-50%  - Stress Test 5/26/22: No evidence of ischemia or scar. LVEF (73%) & LV wall motion is normal  - Cont Toprol XL 25mg daily   - Will likely benefit from jardiance - cardiology to assess following BMT     6. Pulmonary:   - Pre-Txp PFTs: FEV1/% / DLCOcor 83%  - Encourage IS & ambulation      7. GI / Nutrition:  Appetite and oral intake is good. - Cont low microbial diet   - Follow closely with dietary     8. Psych: H/o panic attacked (heart palpitations & chest discomfort) since 1988  - Cont buspar 15mg BID PRN  - Cont celexa 20mg nightly     9.  MSK:   - PT/OT consulted for prehabilitation / mobility program     - DVT Prophylaxis: Platelets >84,669 cells/dL, - daily lovenox prophylaxis ordered  Contraindications to pharmacologic prophylaxis: None  Contraindications to mechanical prophylaxis: None     - Disposition:  Once ANC >1 and recovered from toxicities of transplant     Migdalia Ramirez APRN - CNP     Trudy Carson MD

## 2022-06-16 LAB
ANION GAP SERPL CALCULATED.3IONS-SCNC: 9 MMOL/L (ref 3–16)
APTT: 24.4 SEC (ref 23–34.3)
BASOPHILS ABSOLUTE: 0 K/UL (ref 0–0.2)
BASOPHILS RELATIVE PERCENT: 0.7 %
BUN BLDV-MCNC: 14 MG/DL (ref 7–20)
CALCIUM SERPL-MCNC: 8 MG/DL (ref 8.3–10.6)
CHLORIDE BLD-SCNC: 103 MMOL/L (ref 99–110)
CO2: 28 MMOL/L (ref 21–32)
CREAT SERPL-MCNC: <0.5 MG/DL (ref 0.6–1.1)
EOSINOPHILS ABSOLUTE: 0 K/UL (ref 0–0.6)
EOSINOPHILS RELATIVE PERCENT: 0.3 %
GFR AFRICAN AMERICAN: >60
GFR NON-AFRICAN AMERICAN: >60
GLUCOSE BLD-MCNC: 96 MG/DL (ref 70–99)
HCT VFR BLD CALC: 25.9 % (ref 36–48)
HEMOGLOBIN: 8.7 G/DL (ref 12–16)
INR BLD: 1.07 (ref 0.87–1.14)
LYMPHOCYTES ABSOLUTE: 0.2 K/UL (ref 1–5.1)
LYMPHOCYTES RELATIVE PERCENT: 4.8 %
MAGNESIUM: 2.1 MG/DL (ref 1.8–2.4)
MCH RBC QN AUTO: 34.3 PG (ref 26–34)
MCHC RBC AUTO-ENTMCNC: 33.7 G/DL (ref 31–36)
MCV RBC AUTO: 101.7 FL (ref 80–100)
MONOCYTES ABSOLUTE: 0.1 K/UL (ref 0–1.3)
MONOCYTES RELATIVE PERCENT: 1.9 %
NEUTROPHILS ABSOLUTE: 3.1 K/UL (ref 1.7–7.7)
NEUTROPHILS RELATIVE PERCENT: 92.3 %
PDW BLD-RTO: 14.8 % (ref 12.4–15.4)
PHOSPHORUS: 3 MG/DL (ref 2.5–4.9)
PLATELET # BLD: 133 K/UL (ref 135–450)
PMV BLD AUTO: 7.9 FL (ref 5–10.5)
POTASSIUM SERPL-SCNC: 3.3 MMOL/L (ref 3.5–5.1)
PROTHROMBIN TIME: 13.8 SEC (ref 11.7–14.5)
RBC # BLD: 2.54 M/UL (ref 4–5.2)
SODIUM BLD-SCNC: 140 MMOL/L (ref 136–145)
URIC ACID, SERUM: 3.2 MG/DL (ref 2.6–6)
WBC # BLD: 3.4 K/UL (ref 4–11)

## 2022-06-16 PROCEDURE — 2060000000 HC ICU INTERMEDIATE R&B

## 2022-06-16 PROCEDURE — 84100 ASSAY OF PHOSPHORUS: CPT

## 2022-06-16 PROCEDURE — 6370000000 HC RX 637 (ALT 250 FOR IP): Performed by: NURSE PRACTITIONER

## 2022-06-16 PROCEDURE — 80048 BASIC METABOLIC PNL TOTAL CA: CPT

## 2022-06-16 PROCEDURE — 6370000000 HC RX 637 (ALT 250 FOR IP): Performed by: INTERNAL MEDICINE

## 2022-06-16 PROCEDURE — 85730 THROMBOPLASTIN TIME PARTIAL: CPT

## 2022-06-16 PROCEDURE — 83735 ASSAY OF MAGNESIUM: CPT

## 2022-06-16 PROCEDURE — 85610 PROTHROMBIN TIME: CPT

## 2022-06-16 PROCEDURE — 6360000002 HC RX W HCPCS: Performed by: NURSE PRACTITIONER

## 2022-06-16 PROCEDURE — 84550 ASSAY OF BLOOD/URIC ACID: CPT

## 2022-06-16 PROCEDURE — 85025 COMPLETE CBC W/AUTO DIFF WBC: CPT

## 2022-06-16 PROCEDURE — 2580000003 HC RX 258: Performed by: NURSE PRACTITIONER

## 2022-06-16 PROCEDURE — 36592 COLLECT BLOOD FROM PICC: CPT

## 2022-06-16 RX ORDER — PANTOPRAZOLE SODIUM 40 MG/1
40 TABLET, DELAYED RELEASE ORAL
Status: DISCONTINUED | OUTPATIENT
Start: 2022-06-17 | End: 2022-06-21

## 2022-06-16 RX ORDER — CALCIUM CARBONATE 200(500)MG
500 TABLET,CHEWABLE ORAL 3 TIMES DAILY PRN
Status: DISCONTINUED | OUTPATIENT
Start: 2022-06-16 | End: 2022-06-27 | Stop reason: HOSPADM

## 2022-06-16 RX ADMIN — VALACYCLOVIR HYDROCHLORIDE 500 MG: 500 TABLET, FILM COATED ORAL at 20:50

## 2022-06-16 RX ADMIN — ASPIRIN 81 MG: 81 TABLET, COATED ORAL at 20:50

## 2022-06-16 RX ADMIN — DEXTROSE AND SODIUM CHLORIDE: 5; 450 INJECTION, SOLUTION INTRAVENOUS at 05:27

## 2022-06-16 RX ADMIN — POTASSIUM CHLORIDE 20 MEQ: 400 INJECTION, SOLUTION INTRAVENOUS at 05:28

## 2022-06-16 RX ADMIN — METOPROLOL SUCCINATE 25 MG: 25 TABLET, EXTENDED RELEASE ORAL at 20:50

## 2022-06-16 RX ADMIN — SODIUM CHLORIDE, PRESERVATIVE FREE 10 ML: 5 INJECTION INTRAVENOUS at 08:18

## 2022-06-16 RX ADMIN — PROCHLORPERAZINE MALEATE 10 MG: 10 TABLET ORAL at 12:40

## 2022-06-16 RX ADMIN — CITALOPRAM HYDROBROMIDE 20 MG: 20 TABLET ORAL at 20:50

## 2022-06-16 RX ADMIN — POTASSIUM CHLORIDE 20 MEQ: 400 INJECTION, SOLUTION INTRAVENOUS at 12:38

## 2022-06-16 RX ADMIN — MULTIPLE VITAMINS W/ MINERALS TAB 1 TABLET: TAB at 08:17

## 2022-06-16 RX ADMIN — ENOXAPARIN SODIUM 40 MG: 100 INJECTION SUBCUTANEOUS at 18:56

## 2022-06-16 RX ADMIN — FLUCONAZOLE 400 MG: 200 TABLET ORAL at 08:18

## 2022-06-16 RX ADMIN — SODIUM CHLORIDE 15 ML: 900 IRRIGANT IRRIGATION at 20:51

## 2022-06-16 RX ADMIN — VALACYCLOVIR HYDROCHLORIDE 500 MG: 500 TABLET, FILM COATED ORAL at 08:18

## 2022-06-16 RX ADMIN — SODIUM CHLORIDE, PRESERVATIVE FREE 10 ML: 5 INJECTION INTRAVENOUS at 20:51

## 2022-06-16 RX ADMIN — POTASSIUM CHLORIDE 20 MEQ: 400 INJECTION, SOLUTION INTRAVENOUS at 10:45

## 2022-06-16 RX ADMIN — POTASSIUM CHLORIDE 20 MEQ: 400 INJECTION, SOLUTION INTRAVENOUS at 08:15

## 2022-06-16 NOTE — PLAN OF CARE
Problem: Safety - Adult  Goal: Free from fall injury  Outcome: Progressing  Flowsheets (Taken 6/14/2022 1504 by Marielle Maldonado RN)  Free From Fall Injury: Instruct family/caregiver on patient safety  Note:   Orthostatic vital signs obtained at start of shift - see flowsheet for details. Pt does not meet criteria for orthostasis. Pt is a Med fall risk. See Savanah Cardenasig Fall Score and ABCDS Injury Risk assessments. - Screening for Orthostasis AND not a [place] Risk per ROSALES/ABCDS: Pt bed is in low position, side rails up, call light and belongings are in reach. Fall risk light is on outside pts room. Pt encouraged to call for assistance as needed. Will continue with hourly rounds for PO intake, pain needs, toileting and repositioning as needed. Problem: ABCDS Injury Assessment  Goal: Absence of physical injury  Outcome: Progressing     Problem: Pain  Goal: Verbalizes/displays adequate comfort level or baseline comfort level  Outcome: Progressing  Flowsheets (Taken 6/14/2022 1645 by Marielle Maldonado RN)  Verbalizes/displays adequate comfort level or baseline comfort level: Encourage patient to monitor pain and request assistance  Note:   Pt not complaining of any pain this PM. Pt instructed to call for new/increasing pain levels. Pt verbalized understanding. Will continue to monitor. Problem: Chronic Conditions and Co-morbidities  Goal: Patient's chronic conditions and co-morbidity symptoms are monitored and maintained or improved  Outcome: Progressing     Problem: Infection - Adult  Goal: Absence of infection during hospitalization  Outcome: Progressing  Flowsheets (Taken 6/14/2022 0815 by Marielle Maldonado RN)  Absence of infection during hospitalization: Assess and monitor for signs and symptoms of infection  Note: Patient remains free of signs symptoms of infection this shift.   Goal: Absence of fever/infection during anticipated neutropenic period  Outcome: Progressing     Problem: Metabolic/Fluid and Electrolytes - Adult  Goal: Electrolytes maintained within normal limits  Outcome: Progressing  Goal: Hemodynamic stability and optimal renal function maintained  Outcome: Progressing     Problem: Hematologic - Adult  Goal: Maintains hematologic stability  Outcome: Progressing  Flowsheets (Taken 6/12/2022 0815 by Marielle Maldonado RN)  Maintains hematologic stability: Assess for signs and symptoms of bleeding or hemorrhage  Note:   Patient's hemoglobin this AM: Recent Labs     06/16/22  0400   HGB 8.7*     Patient's platelet count this AM:   Recent Labs     06/16/22  0400   *    Thrombocytopenia not present at this time. Patient showing no signs or symptoms of active bleeding. Transfusion not indicated at this time. Patient verbalizes understanding of all instructions. Will continue to assess and implement POC. Call light within reach and hourly rounding in place.

## 2022-06-16 NOTE — PROGRESS NOTES
[facility] Progress Note    2022     Vasiliy Garcia    MRN: [id number]    : 1970    Referring MD: No referring provider defined for this encounter.     SUBJECTIVE: she feels good overall     ECOG PS:  (1) Restricted in physically strenuous activity, ambulatory and able to do work of light nature    KPS: 80% Normal activity with effort; some signs or symptoms of disease    Isolation: None    Medications    Scheduled Meds:   [START ON 2022] [id number]  300 mcg SubCUTAneous QPM    sodium chloride flush  5-40 mL IntraVENous 2 times per day    Saline Mouthwash  15 mL Swish & Spit 4x Daily AC & HS    valACYclovir  500 mg Oral BID    fluconazole  400 mg Oral Daily    enoxaparin  40 mg SubCUTAneous Daily    furosemide  40 mg IntraVENous Q12H    aspirin EC  81 mg Oral Daily    citalopram  20 mg Oral Daily    metoprolol succinate  25 mg Oral Daily    therapeutic multivitamin-minerals  1 tablet Oral Daily     Continuous Infusions:   sodium chloride      sodium chloride      sodium chloride 20 mL/hr at 06/10/22 1838    sodium chloride      dextrose 5 % and 0.45 % NaCl 50 mL/hr at 22 0527     PRN Meds:.morphine, sodium chloride, sodium chloride, diphenhydrAMINE, sodium chloride, sodium chloride flush, sodium chloride, potassium chloride, magnesium sulfate, magnesium hydroxide, Saline Mouthwash, alteplase (CATHFLO) with sterile water injection, prochlorperazine **OR** prochlorperazine, LORazepam **OR** LORazepam, busPIRone, diphenhydrAMINE, hydrOXYzine pamoate    ROS:  As noted above, otherwise remainder of 10-point ROS negative    Physical Exam:     I&O:      Intake/Output Summary (Last 24 hours) at 2022 1052  Last data filed at 2022 0528  Gross per 24 hour   Intake 5245 ml   Output 4400 ml   Net 845 ml       Vital Signs:  /89   Pulse 81   Temp 98 °F (36.7 °C) (Oral)   Resp 18   Ht 5' 1\" (1.549 m)   Wt 182 lb 12.8 oz (82.9 kg)   SpO2 98%   BMI 34.54 kg/m² Weight:    Wt Readings from Last 3 Encounters:   06/16/22 182 lb 12.8 oz (82.9 kg)   06/08/22 172 lb 12.8 oz (78.4 kg)   05/27/22 170 lb (77.1 kg)         General: Awake, alert and oriented. HEENT: normocephalic, PERRL, no scleral erythema or icterus, Oral mucosa moist and intact, throat clear  NECK: supple  BACK: Straight   SKIN: warm dry and intact without lesions rashes or masses  CHEST: CTA bilaterally without use of accessory muscles  CV: Normal S1 S2, RRR, no MRG  ABD: NT ND normoactive BS, no palpable masses or hepatosplenomegaly  EXTREMITIES: without edema, denies calf tenderness  NEURO: CN II - XII grossly intact  CATHETER: rt upper chest wall heath intact at the insertion site     Data    CBC:   Recent Labs     06/14/22  0340 06/15/22  0415 06/16/22  0400   WBC 2.5* 2.1* 3.4*   HGB 9.2* 9.2* 8.7*   HCT 27.1* 26.6* 25.9*   .6* 101.2* 101.7*    153 133*     BMP/Mag:  Recent Labs     06/14/22  0340 06/15/22  0415 06/16/22  0400    138 140   K 4.2 3.8 3.3*    101 103   CO2 25 25 28   PHOS 2.5 3.2 3.0   BUN 15 16 14   CREATININE <0.5* <0.5* <0.5*   MG  --   --  2.10     LIVP:   Recent Labs     06/15/22  0415   AST 47*   *   BILIDIR <0.2   BILITOT 0.8   ALKPHOS 112     Coags:   Recent Labs     06/16/22  0400   PROTIME 13.8   INR 1.07   APTT 24.4     Uric Acid   Recent Labs     06/14/22  0340 06/15/22  0415 06/16/22  0400   LABURIC 2.3* 2.7 3.2       PROBLEM LIST:             1. DLBC NHL  2. COVID19 PNA  3. Anxiety / H/o Panic Attacks  4. HLD  5. CAD  6. Chronic systolic HF      TREATMENT:            1. R-CHOP x 6 cycles (2/21/21-6/4/21)  - Imbruvica added with cycle #2  2. R-ICE x2 cycles 3/28/22-4/19/22  3. BEAM followed by Auto SCT on 6/15/22    ASSESSMENT AND PLAN:           1. DLBC NHL: R/R, now in CR  - PET/CT 5/4/22: CR  - BM Bx/Asp 5/4/22: OMARI     Auto Day +1      2.  ID:  Afebrile, no evidence of infection.    - Cont Valtrex ppx; cont acyclovir at discharge for VZV positive titer.    - Start Diflucan ppx 6/15/22   - Start Levaquin ppx when [address] < 1.5     3. Heme: Leukopenia & anemia 2/2 chemotherapy  - Transfuse for Hgb < 7 and Platelets < 95J  - No transfusion today     4. Metabolic: Stable  - Cont IVFs: D51/2 at 50 mL/hr  - Replace potassium and magnesium per PRN orders     5. Cardiac: H/o CHF, CAD, HLD  - Cleared for transplant by Dr. Hafsa Farias  HLD:   - Does not tolerate statins  - Consider Leqvio after transplant per Dr. Hafsa Farias  CAD:   - Angiogram 2019 with borderline lesions but no PCI performed  - Cont ASA until Plts <69P  Chronic Systolic HF:   - Echo 5/3/19 w/EF 45-50%  - Stress Test 5/26/22: No evidence of ischemia or scar. LVEF (73%) & LV wall motion is normal  - Cont Toprol XL 25mg daily   - Will likely benefit from jardiance - cardiology to assess following BMT     6. Pulmonary:   - Pre-Txp PFTs: FEV1/% / DLCOcor 83%  - Encourage IS & ambulation      7. GI / Nutrition:  Appetite and oral intake is good. - Cont low microbial diet   - Follow closely with dietary     8. Psych: H/o panic attacked (heart palpitations & chest discomfort) since 1988  - Cont buspar 15mg BID PRN  - Cont celexa 20mg nightly     9.  MSK:   - PT/OT consulted for prehabilitation / mobility program     - DVT Prophylaxis: Platelets >95,880 cells/dL, - daily lovenox prophylaxis ordered  Contraindications to pharmacologic prophylaxis: None  Contraindications to mechanical prophylaxis: None     - Disposition:  Once ANC >1 and recovered from toxicities of transplant     Orlando Cruz MD

## 2022-06-16 NOTE — PROGRESS NOTES
Nutrition Note     NUTRITION RECOMMENDATIONS:   1. PO Diet: Continue regular; low microbial diet   2. ONS: Offer if meal intakes <50% of meals    NUTRITION ASSESSMENT:  Nutritional summary & status: Pt assessed for LOS x6 days. She reports good po intakes, complains of slight nausea that was resolved with medication. Weight has been stable. Avg po intakes ~75% of meals per EMR. RD briefly reviewed food safety diet education as pt reports she was previously education. RD to monitor po intakes and nutrition status throughout adm.  Admission/PMH: auto 6/15/22 / HLD, HTN, CAD, HF      MALNUTRITION ASSESSMENT  Context of Malnutrition: Acute Illness (on chronic)   Malnutrition Status: No malnutrition    NUTRITION DIAGNOSIS   Increased nutrient needs related to catabolic illness as evidenced by other (comment) (BMT)    [address] and/or Nutrient Delivery:  Continue Current Diet  Nutrition Education/Counseling:  Education completed     The patient will still be monitored per nutrition standards of care. Consult dietitian if nutrition interventions essential to patient care is needed.      Shella Bumpers, [address] [place] [address], [address] [place] [address] Drive:  [phone]  Office:  [phone]

## 2022-06-17 LAB
ALBUMIN SERPL-MCNC: 3.8 G/DL (ref 3.4–5)
ALP BLD-CCNC: 105 U/L (ref 40–129)
ALT SERPL-CCNC: 89 U/L (ref 10–40)
ANION GAP SERPL CALCULATED.3IONS-SCNC: 10 MMOL/L (ref 3–16)
AST SERPL-CCNC: 34 U/L (ref 15–37)
BASOPHILS ABSOLUTE: 0 K/UL (ref 0–0.2)
BASOPHILS RELATIVE PERCENT: 0.5 %
BILIRUB SERPL-MCNC: 0.6 MG/DL (ref 0–1)
BILIRUBIN DIRECT: <0.2 MG/DL (ref 0–0.3)
BILIRUBIN, INDIRECT: ABNORMAL MG/DL (ref 0–1)
BUN BLDV-MCNC: 12 MG/DL (ref 7–20)
CALCIUM SERPL-MCNC: 8.7 MG/DL (ref 8.3–10.6)
CHLORIDE BLD-SCNC: 103 MMOL/L (ref 99–110)
CO2: 25 MMOL/L (ref 21–32)
CREAT SERPL-MCNC: <0.5 MG/DL (ref 0.6–1.1)
EOSINOPHILS ABSOLUTE: 0 K/UL (ref 0–0.6)
EOSINOPHILS RELATIVE PERCENT: 0.1 %
GFR AFRICAN AMERICAN: >60
GFR NON-AFRICAN AMERICAN: >60
GLUCOSE BLD-MCNC: 91 MG/DL (ref 70–99)
HCT VFR BLD CALC: 27.7 % (ref 36–48)
HEMOGLOBIN: 9.2 G/DL (ref 12–16)
LACTATE DEHYDROGENASE: 292 U/L (ref 100–190)
LYMPHOCYTES ABSOLUTE: 0.2 K/UL (ref 1–5.1)
LYMPHOCYTES RELATIVE PERCENT: 12.5 %
MCH RBC QN AUTO: 34.1 PG (ref 26–34)
MCHC RBC AUTO-ENTMCNC: 33.4 G/DL (ref 31–36)
MCV RBC AUTO: 102.1 FL (ref 80–100)
MONOCYTES ABSOLUTE: 0 K/UL (ref 0–1.3)
MONOCYTES RELATIVE PERCENT: 2.4 %
NEUTROPHILS ABSOLUTE: 1.3 K/UL (ref 1.7–7.7)
NEUTROPHILS RELATIVE PERCENT: 84.5 %
PDW BLD-RTO: 14.7 % (ref 12.4–15.4)
PHOSPHORUS: 3.3 MG/DL (ref 2.5–4.9)
PLATELET # BLD: 114 K/UL (ref 135–450)
PMV BLD AUTO: 7.8 FL (ref 5–10.5)
POTASSIUM SERPL-SCNC: 4 MMOL/L (ref 3.5–5.1)
RBC # BLD: 2.71 M/UL (ref 4–5.2)
SODIUM BLD-SCNC: 138 MMOL/L (ref 136–145)
TOTAL PROTEIN: 5.6 G/DL (ref 6.4–8.2)
URIC ACID, SERUM: 3 MG/DL (ref 2.6–6)
WBC # BLD: 1.6 K/UL (ref 4–11)

## 2022-06-17 PROCEDURE — 6370000000 HC RX 637 (ALT 250 FOR IP): Performed by: NURSE PRACTITIONER

## 2022-06-17 PROCEDURE — 80048 BASIC METABOLIC PNL TOTAL CA: CPT

## 2022-06-17 PROCEDURE — 2580000003 HC RX 258: Performed by: NURSE PRACTITIONER

## 2022-06-17 PROCEDURE — 84100 ASSAY OF PHOSPHORUS: CPT

## 2022-06-17 PROCEDURE — 6370000000 HC RX 637 (ALT 250 FOR IP): Performed by: INTERNAL MEDICINE

## 2022-06-17 PROCEDURE — 83615 LACTATE (LD) (LDH) ENZYME: CPT

## 2022-06-17 PROCEDURE — 84550 ASSAY OF BLOOD/URIC ACID: CPT

## 2022-06-17 PROCEDURE — 2060000000 HC ICU INTERMEDIATE R&B

## 2022-06-17 PROCEDURE — 80076 HEPATIC FUNCTION PANEL: CPT

## 2022-06-17 PROCEDURE — 36592 COLLECT BLOOD FROM PICC: CPT

## 2022-06-17 PROCEDURE — 85025 COMPLETE CBC W/AUTO DIFF WBC: CPT

## 2022-06-17 PROCEDURE — 6360000002 HC RX W HCPCS: Performed by: NURSE PRACTITIONER

## 2022-06-17 RX ORDER — ONDANSETRON 4 MG/1
8 TABLET, ORALLY DISINTEGRATING ORAL EVERY 8 HOURS SCHEDULED
Status: DISCONTINUED | OUTPATIENT
Start: 2022-06-17 | End: 2022-06-21

## 2022-06-17 RX ORDER — ONDANSETRON 2 MG/ML
8 INJECTION INTRAMUSCULAR; INTRAVENOUS EVERY 8 HOURS SCHEDULED
Status: DISCONTINUED | OUTPATIENT
Start: 2022-06-17 | End: 2022-06-26

## 2022-06-17 RX ORDER — ONDANSETRON 2 MG/ML
4 INJECTION INTRAMUSCULAR; INTRAVENOUS ONCE
Status: COMPLETED | OUTPATIENT
Start: 2022-06-17 | End: 2022-06-17

## 2022-06-17 RX ORDER — LEVOFLOXACIN 250 MG/1
500 TABLET ORAL NIGHTLY
Status: DISCONTINUED | OUTPATIENT
Start: 2022-06-17 | End: 2022-06-21

## 2022-06-17 RX ADMIN — ASPIRIN 81 MG: 81 TABLET, COATED ORAL at 20:33

## 2022-06-17 RX ADMIN — ENOXAPARIN SODIUM 40 MG: 100 INJECTION SUBCUTANEOUS at 17:56

## 2022-06-17 RX ADMIN — ONDANSETRON 8 MG: 4 TABLET, ORALLY DISINTEGRATING ORAL at 15:22

## 2022-06-17 RX ADMIN — SODIUM CHLORIDE, PRESERVATIVE FREE 10 ML: 5 INJECTION INTRAVENOUS at 09:50

## 2022-06-17 RX ADMIN — METOPROLOL SUCCINATE 25 MG: 25 TABLET, EXTENDED RELEASE ORAL at 20:33

## 2022-06-17 RX ADMIN — CITALOPRAM HYDROBROMIDE 20 MG: 20 TABLET ORAL at 20:33

## 2022-06-17 RX ADMIN — SODIUM CHLORIDE 15 ML: 900 IRRIGANT IRRIGATION at 20:34

## 2022-06-17 RX ADMIN — ONDANSETRON 8 MG: 4 TABLET, ORALLY DISINTEGRATING ORAL at 22:34

## 2022-06-17 RX ADMIN — LEVOFLOXACIN 500 MG: 250 TABLET, FILM COATED ORAL at 20:33

## 2022-06-17 RX ADMIN — DEXTROSE AND SODIUM CHLORIDE: 5; 450 INJECTION, SOLUTION INTRAVENOUS at 22:36

## 2022-06-17 RX ADMIN — VALACYCLOVIR HYDROCHLORIDE 500 MG: 500 TABLET, FILM COATED ORAL at 20:33

## 2022-06-17 RX ADMIN — DEXTROSE AND SODIUM CHLORIDE: 5; 450 INJECTION, SOLUTION INTRAVENOUS at 02:52

## 2022-06-17 RX ADMIN — ONDANSETRON 4 MG: 2 INJECTION INTRAMUSCULAR; INTRAVENOUS at 11:00

## 2022-06-17 RX ADMIN — VALACYCLOVIR HYDROCHLORIDE 500 MG: 500 TABLET, FILM COATED ORAL at 09:50

## 2022-06-17 RX ADMIN — PANTOPRAZOLE SODIUM 40 MG: 40 TABLET, DELAYED RELEASE ORAL at 09:00

## 2022-06-17 RX ADMIN — FLUCONAZOLE 400 MG: 200 TABLET ORAL at 09:50

## 2022-06-17 RX ADMIN — PROCHLORPERAZINE MALEATE 10 MG: 10 TABLET ORAL at 02:45

## 2022-06-17 RX ADMIN — PROCHLORPERAZINE MALEATE 10 MG: 10 TABLET ORAL at 09:00

## 2022-06-17 RX ADMIN — MULTIPLE VITAMINS W/ MINERALS TAB 1 TABLET: TAB at 09:50

## 2022-06-17 RX ADMIN — SODIUM CHLORIDE, PRESERVATIVE FREE 10 ML: 5 INJECTION INTRAVENOUS at 20:33

## 2022-06-17 ASSESSMENT — PAIN SCALES - GENERAL
PAINLEVEL_OUTOF10: 0

## 2022-06-17 NOTE — PLAN OF CARE
Problem: Safety - Adult  Goal: Free from fall injury  Outcome: Progressing  Flowsheets (Taken 6/14/2022 1504 by Gee Chaney RN)  Free From Fall Injury: Instruct family/caregiver on patient safety  Note:   Orthostatic vital signs obtained at start of shift - see flowsheet for details. Pt does not meet criteria for orthostasis. Pt is a Med fall risk. See Yaa Dryer Fall Score and ABCDS Injury Risk assessments. - Screening for Orthostasis AND not a [place] Risk per ROSALES/ABCDS: Pt bed is in low position, side rails up, call light and belongings are in reach. Fall risk light is on outside pts room. Pt encouraged to call for assistance as needed. Will continue with hourly rounds for PO intake, pain needs, toileting and repositioning as needed. Problem: ABCDS Injury Assessment  Goal: Absence of physical injury  Outcome: Progressing     Problem: Pain  Goal: Verbalizes/displays adequate comfort level or baseline comfort level  Outcome: Progressing  Flowsheets (Taken 6/14/2022 1645 by Gee Chaney RN)  Verbalizes/displays adequate comfort level or baseline comfort level: Encourage patient to monitor pain and request assistance  Note:   Pt not complaining of any pain this PM. Pt instructed to call for new/increasing pain levels. Pt verbalized understanding. Will continue to monitor. Problem: Chronic Conditions and Co-morbidities  Goal: Patient's chronic conditions and co-morbidity symptoms are monitored and maintained or improved  Outcome: Progressing     Problem: Infection - Adult  Goal: Absence of infection during hospitalization  Outcome: Progressing  Flowsheets (Taken 6/14/2022 0815 by Gee Chaney RN)  Absence of infection during hospitalization: Assess and monitor for signs and symptoms of infection  Note:   Patient remains free of signs symptoms of infection this shift.   Goal: Absence of fever/infection during anticipated neutropenic period  Outcome: Progressing     Problem: Metabolic/Fluid and Electrolytes - Adult  Goal: Electrolytes maintained within normal limits  Outcome: Progressing  Goal: Hemodynamic stability and optimal renal function maintained  Outcome: Progressing     Problem: Hematologic - Adult  Goal: Maintains hematologic stability  Outcome: Progressing  Flowsheets (Taken 6/12/2022 0815 by Tej Chandra RN)  Maintains hematologic stability: Assess for signs and symptoms of bleeding or hemorrhage

## 2022-06-17 NOTE — PROGRESS NOTES
[address] Progress Note    2022     Israel Mims    MRN: [id number]    : 1970    Referring MD: No referring provider defined for this encounter. SUBJECTIVE:  She reports nausea that started last evening. Her po intake is fair. Otherwise, no complaints.      ECOG PS:  (1) Restricted in physically strenuous activity, ambulatory and able to do work of light nature    KPS: 80% Normal activity with effort; some signs or symptoms of disease    Isolation: None    Medications    Scheduled Meds:   pantoprazole  40 mg Oral QAM AC    [START ON 2022] filgrastim-aafi  300 mcg SubCUTAneous QPM    sodium chloride flush  5-40 mL IntraVENous 2 times per day    Saline Mouthwash  15 mL Swish & Spit 4x Daily AC & HS    valACYclovir  500 mg Oral BID    fluconazole  400 mg Oral Daily    enoxaparin  40 mg SubCUTAneous Daily    furosemide  40 mg IntraVENous Q12H    aspirin EC  81 mg Oral Daily    citalopram  20 mg Oral Daily    metoprolol succinate  25 mg Oral Daily    therapeutic multivitamin-minerals  1 tablet Oral Daily     Continuous Infusions:   sodium chloride      sodium chloride 20 mL/hr at 06/10/22 1838    sodium chloride      dextrose 5 % and 0.45 % NaCl 50 mL/hr at 22 0252     PRN Meds:.calcium carbonate, morphine, sodium chloride, diphenhydrAMINE, sodium chloride, sodium chloride flush, sodium chloride, potassium chloride, magnesium sulfate, magnesium hydroxide, Saline Mouthwash, alteplase (CATHFLO) with sterile water injection, prochlorperazine **OR** prochlorperazine, LORazepam **OR** LORazepam, busPIRone, diphenhydrAMINE, hydrOXYzine pamoate    ROS:  As noted above, otherwise remainder of 10-point ROS negative    Physical Exam:     I&O:      Intake/Output Summary (Last 24 hours) at 2022 0809  Last data filed at 2022 0522  Gross per 24 hour   Intake 2283 ml   Output 2400 ml   Net -117 ml       Vital Signs:  BP (!) 114/49   Pulse 72   Temp 98.1 °F (36.7 °C) (Oral) Resp 20   Ht 5' 1\" (1.549 m)   Wt 182 lb 12.8 oz (82.9 kg)   SpO2 92%   BMI 34.54 kg/m²     Weight:    Wt Readings from Last 3 Encounters:   06/16/22 182 lb 12.8 oz (82.9 kg)   06/08/22 172 lb 12.8 oz (78.4 kg)   05/27/22 170 lb (77.1 kg)         General: Awake, alert and oriented. HEENT: normocephalic, PERRL, no scleral erythema or icterus, Oral mucosa moist and intact, throat clear  NECK: supple  BACK: Straight   SKIN: warm dry and intact without lesions rashes or masses  CHEST: CTA bilaterally without use of accessory muscles  CV: Normal S1 S2, RRR, no MRG  ABD: NT ND normoactive BS, no palpable masses or hepatosplenomegaly  EXTREMITIES: without edema, denies calf tenderness  NEURO: CN II - XII grossly intact  CATHETER: rt upper chest wall heath intact at the insertion site     Data    CBC:   Recent Labs     06/15/22  0415 06/16/22  0400 06/17/22  0312   WBC 2.1* 3.4* 1.6*   HGB 9.2* 8.7* 9.2*   HCT 26.6* 25.9* 27.7*   .2* 101.7* 102.1*    133* 114*     BMP/Mag:  Recent Labs     06/15/22  0415 06/16/22  0400 06/17/22 0312    140 138   K 3.8 3.3* 4.0    103 103   CO2 25 28 25   PHOS 3.2 3.0 3.3   BUN 16 14 12   CREATININE <0.5* <0.5* <0.5*   MG  --  2.10  --      LIVP:   Recent Labs     06/15/22  0415 06/17/22  0312   AST 47* 34   * 89*   BILIDIR <0.2 <0.2   BILITOT 0.8 0.6   ALKPHOS 112 105     Coags:   Recent Labs     06/16/22 0400   PROTIME 13.8   INR 1.07   APTT 24.4     Uric Acid   Recent Labs     06/15/22  0415 06/16/22  0400 06/17/22 0312   LABURIC 2.7 3.2 3.0       PROBLEM LIST:             1. DLB NHL  2. COVID19 PNA  3. Anxiety / H/o Panic Attacks  4. HLD  5. CAD  6. Chronic systolic HF      TREATMENT:            1. R-CHOP x 6 cycles (2/21/21-6/4/21)  - Imbruvica added with cycle #2  2. R-ICE x 2 cycles 3/28/22-4/19/22  3. BEAM followed by Auto SCT on 6/15/22    ASSESSMENT AND PLAN:           1.  [facility] NHL: R/R, now in CR  - PET/CT 5/4/22: CR  - BM Bx/Asp 5/4/22: OMARI     Auto Day +2     2. ID:  Afebrile, no evidence of infection.    - Cont Diflucan & Valtrex ppx; cont acyclovir at discharge for VZV positive titer.    - Start Levaquin ppx 6/17     3. Heme: Pancytopenia 2/2 chemotherapy  - Transfuse for Hgb < 7 and Platelets < 08O  - No transfusion today     4. Metabolic: Stable  - Cont IVFs: D51/2 at 50mL/hr  - Replace potassium and magnesium per PRN orders     5. Cardiac: H/o CHF, CAD, HLD  - Cleared for transplant by Dr. Audie Armenta  HLD:   - Does not tolerate statins  - Consider Leqvio after transplant per Dr. Audie Armenta  CAD:   - Angiogram 2019 with borderline lesions but no PCI performed  - Cont ASA until Plts <89D  Chronic Systolic HF:   - Echo 1/2/55 w/EF 45-50%  - Stress Test 5/26/22: No evidence of ischemia or scar. LVEF (73%) & LV wall motion is normal  - Cont Toprol XL 25mg daily   - Will likely benefit from jardiance - cardiology to assess following BMT     6. Pulmonary:   - Pre-Txp PFTs: FEV1/% / DLCOcor 83%  - Encourage IS & ambulation      7. GI / Nutrition:  Appetite and oral intake is good. - Cont low microbial diet   - Follow closely with dietary     8. Psych: H/o panic attacked (heart palpitations & chest discomfort) since 1988  - Cont buspar 15mg BID PRN  - Cont celexa 20mg nightly     9.  MSK:   - PT/OT consulted for prehabilitation / mobility program     - DVT Prophylaxis: Platelets >35,682 cells/dL, - daily lovenox prophylaxis ordered  Contraindications to pharmacologic prophylaxis: None  Contraindications to mechanical prophylaxis: None     - Disposition:  Once ANC >1 and recovered from toxicities of transplant     ELEAZAR Holbrook - CNP     Jones Moreira MD

## 2022-06-17 NOTE — PROGRESS NOTES
Pt with slight orthostasis this morning. Pt asymptomatic with no complaints of feeling lightheaded or dizzy. ROBERTO Wilson notified- no new orders at this time. Reassessed orthos 2 hours later and pt no longer orthostatic. Will continue to monitor.

## 2022-06-17 NOTE — PLAN OF CARE
Problem: Safety - Adult  Goal: Free from fall injury  Outcome: Progressing  Note: Orthostatic vital signs obtained at start of shift - see flowsheet for details. Pt does not meet criteria for orthostasis. Pt is a Med fall risk. See Wyline Ion Fall Score and ABCDS Injury Risk assessments. - Screening for Orthostasis AND not a [place] Risk per ROSALES/ABCDS: Pt bed is in low position, side rails up, call light and belongings are in reach. Fall risk light is on outside pts room. Pt encouraged to call for assistance as needed. Will continue with hourly rounds for PO intake, pain needs, toileting and repositioning as needed. Problem: Pain  Goal: Verbalizes/displays adequate comfort level or baseline comfort level  Outcome: Progressing  Note: No complaints of pain noted this shift. Will continue to monitor. Problem: Hematologic - Adult  Goal: Maintains hematologic stability  Outcome: Progressing  Note: Patient's hemoglobin this AM:   Recent Labs     06/17/22  0312   HGB 9.2*     Patient's platelet count this AM:   Recent Labs     06/17/22  0312   *    Thrombocytopenia not present at this time. Patient showing no signs or symptoms of active bleeding. Transfusion not indicated at this time. Patient verbalizes understanding of all instructions. Will continue to assess and implement POC. Call light within reach and hourly rounding in place.

## 2022-06-18 LAB
ANION GAP SERPL CALCULATED.3IONS-SCNC: 9 MMOL/L (ref 3–16)
BUN BLDV-MCNC: 10 MG/DL (ref 7–20)
C DIFF TOXIN/ANTIGEN: NORMAL
CALCIUM SERPL-MCNC: 8.8 MG/DL (ref 8.3–10.6)
CHLORIDE BLD-SCNC: 100 MMOL/L (ref 99–110)
CO2: 26 MMOL/L (ref 21–32)
CREAT SERPL-MCNC: 0.5 MG/DL (ref 0.6–1.1)
GFR AFRICAN AMERICAN: >60
GFR NON-AFRICAN AMERICAN: >60
GLUCOSE BLD-MCNC: 101 MG/DL (ref 70–99)
HCT VFR BLD CALC: 26.4 % (ref 36–48)
HEMOGLOBIN: 9.1 G/DL (ref 12–16)
MCH RBC QN AUTO: 35 PG (ref 26–34)
MCHC RBC AUTO-ENTMCNC: 34.6 G/DL (ref 31–36)
MCV RBC AUTO: 101.2 FL (ref 80–100)
PDW BLD-RTO: 14.4 % (ref 12.4–15.4)
PHOSPHORUS: 3.4 MG/DL (ref 2.5–4.9)
PLATELET # BLD: 81 K/UL (ref 135–450)
PMV BLD AUTO: 7.5 FL (ref 5–10.5)
POTASSIUM SERPL-SCNC: 4 MMOL/L (ref 3.5–5.1)
RBC # BLD: 2.6 M/UL (ref 4–5.2)
SODIUM BLD-SCNC: 135 MMOL/L (ref 136–145)
URIC ACID, SERUM: 2.4 MG/DL (ref 2.6–6)
WBC # BLD: 0.4 K/UL (ref 4–11)

## 2022-06-18 PROCEDURE — 2060000000 HC ICU INTERMEDIATE R&B

## 2022-06-18 PROCEDURE — 80048 BASIC METABOLIC PNL TOTAL CA: CPT

## 2022-06-18 PROCEDURE — 36592 COLLECT BLOOD FROM PICC: CPT

## 2022-06-18 PROCEDURE — 2580000003 HC RX 258: Performed by: NURSE PRACTITIONER

## 2022-06-18 PROCEDURE — 87449 NOS EACH ORGANISM AG IA: CPT

## 2022-06-18 PROCEDURE — 85025 COMPLETE CBC W/AUTO DIFF WBC: CPT

## 2022-06-18 PROCEDURE — 6370000000 HC RX 637 (ALT 250 FOR IP): Performed by: INTERNAL MEDICINE

## 2022-06-18 PROCEDURE — 6360000002 HC RX W HCPCS: Performed by: INTERNAL MEDICINE

## 2022-06-18 PROCEDURE — 6370000000 HC RX 637 (ALT 250 FOR IP): Performed by: NURSE PRACTITIONER

## 2022-06-18 PROCEDURE — 84100 ASSAY OF PHOSPHORUS: CPT

## 2022-06-18 PROCEDURE — 6360000002 HC RX W HCPCS: Performed by: NURSE PRACTITIONER

## 2022-06-18 PROCEDURE — 87324 CLOSTRIDIUM AG IA: CPT

## 2022-06-18 PROCEDURE — 84550 ASSAY OF BLOOD/URIC ACID: CPT

## 2022-06-18 PROCEDURE — 2580000003 HC RX 258: Performed by: INTERNAL MEDICINE

## 2022-06-18 RX ORDER — LOPERAMIDE HYDROCHLORIDE 2 MG/1
2 CAPSULE ORAL 4 TIMES DAILY PRN
Status: DISCONTINUED | OUTPATIENT
Start: 2022-06-18 | End: 2022-06-21

## 2022-06-18 RX ORDER — LOPERAMIDE HYDROCHLORIDE 2 MG/1
4 CAPSULE ORAL ONCE
Status: COMPLETED | OUTPATIENT
Start: 2022-06-18 | End: 2022-06-18

## 2022-06-18 RX ORDER — 0.9 % SODIUM CHLORIDE 0.9 %
1000 INTRAVENOUS SOLUTION INTRAVENOUS ONCE
Status: COMPLETED | OUTPATIENT
Start: 2022-06-18 | End: 2022-06-19

## 2022-06-18 RX ADMIN — ONDANSETRON 8 MG: 2 INJECTION INTRAMUSCULAR; INTRAVENOUS at 15:39

## 2022-06-18 RX ADMIN — MULTIPLE VITAMINS W/ MINERALS TAB 1 TABLET: TAB at 09:27

## 2022-06-18 RX ADMIN — DEXTROSE AND SODIUM CHLORIDE: 5; 450 INJECTION, SOLUTION INTRAVENOUS at 18:49

## 2022-06-18 RX ADMIN — FLUCONAZOLE 400 MG: 200 TABLET ORAL at 09:27

## 2022-06-18 RX ADMIN — CITALOPRAM HYDROBROMIDE 20 MG: 20 TABLET ORAL at 20:53

## 2022-06-18 RX ADMIN — PANTOPRAZOLE SODIUM 40 MG: 40 TABLET, DELAYED RELEASE ORAL at 06:37

## 2022-06-18 RX ADMIN — SODIUM CHLORIDE 15 ML: 900 IRRIGANT IRRIGATION at 06:37

## 2022-06-18 RX ADMIN — LOPERAMIDE HYDROCHLORIDE 4 MG: 2 CAPSULE ORAL at 22:30

## 2022-06-18 RX ADMIN — SODIUM CHLORIDE 1000 ML: 9 INJECTION, SOLUTION INTRAVENOUS at 21:50

## 2022-06-18 RX ADMIN — ONDANSETRON 8 MG: 4 TABLET, ORALLY DISINTEGRATING ORAL at 06:37

## 2022-06-18 RX ADMIN — LEVOFLOXACIN 500 MG: 250 TABLET, FILM COATED ORAL at 20:53

## 2022-06-18 RX ADMIN — SODIUM CHLORIDE 15 ML: 900 IRRIGANT IRRIGATION at 20:54

## 2022-06-18 RX ADMIN — SODIUM CHLORIDE, PRESERVATIVE FREE 10 ML: 5 INJECTION INTRAVENOUS at 20:53

## 2022-06-18 RX ADMIN — ONDANSETRON 8 MG: 4 TABLET, ORALLY DISINTEGRATING ORAL at 20:53

## 2022-06-18 RX ADMIN — VALACYCLOVIR HYDROCHLORIDE 500 MG: 500 TABLET, FILM COATED ORAL at 20:53

## 2022-06-18 RX ADMIN — FUROSEMIDE 40 MG: 10 INJECTION, SOLUTION INTRAMUSCULAR; INTRAVENOUS at 17:40

## 2022-06-18 RX ADMIN — PROCHLORPERAZINE MALEATE 10 MG: 10 TABLET ORAL at 12:03

## 2022-06-18 RX ADMIN — VALACYCLOVIR HYDROCHLORIDE 500 MG: 500 TABLET, FILM COATED ORAL at 09:27

## 2022-06-18 RX ADMIN — SODIUM CHLORIDE, PRESERVATIVE FREE 10 ML: 5 INJECTION INTRAVENOUS at 09:28

## 2022-06-18 RX ADMIN — ENOXAPARIN SODIUM 40 MG: 100 INJECTION SUBCUTANEOUS at 17:40

## 2022-06-18 ASSESSMENT — PAIN DESCRIPTION - PAIN TYPE: TYPE: ACUTE PAIN

## 2022-06-18 ASSESSMENT — PAIN DESCRIPTION - DESCRIPTORS: DESCRIPTORS: CRAMPING

## 2022-06-18 ASSESSMENT — PAIN - FUNCTIONAL ASSESSMENT: PAIN_FUNCTIONAL_ASSESSMENT: ACTIVITIES ARE NOT PREVENTED

## 2022-06-18 ASSESSMENT — PAIN DESCRIPTION - LOCATION: LOCATION: ABDOMEN

## 2022-06-18 ASSESSMENT — PAIN SCALES - GENERAL
PAINLEVEL_OUTOF10: 3
PAINLEVEL_OUTOF10: 0
PAINLEVEL_OUTOF10: 3

## 2022-06-18 ASSESSMENT — PAIN DESCRIPTION - ONSET: ONSET: GRADUAL

## 2022-06-18 ASSESSMENT — PAIN DESCRIPTION - FREQUENCY: FREQUENCY: INTERMITTENT

## 2022-06-18 ASSESSMENT — PAIN DESCRIPTION - ORIENTATION: ORIENTATION: MID

## 2022-06-18 NOTE — PROGRESS NOTES
[address] Progress Note    2022     Jaime Gross    MRN: [id number]    : 1970    Referring MD: No referring provider defined for this encounter. SUBJECTIVE:  She reports nausea that started last evening. Her po intake is fair.      ECOG PS:  (1) Restricted in physically strenuous activity, ambulatory and able to do work of light nature    KPS: 80% Normal activity with effort; some signs or symptoms of disease    Isolation: None    Medications    Scheduled Meds:   levoFLOXacin  500 mg Oral Nightly    ondansetron  8 mg Oral 3 times per day    Or    ondansetron  8 mg IntraVENous 3 times per day    pantoprazole  40 mg Oral QAM AC    [START ON 2022] filgrastim-aafi  300 mcg SubCUTAneous QPM    sodium chloride flush  5-40 mL IntraVENous 2 times per day    Saline Mouthwash  15 mL Swish & Spit 4x Daily AC & HS    valACYclovir  500 mg Oral BID    fluconazole  400 mg Oral Daily    enoxaparin  40 mg SubCUTAneous Daily    furosemide  40 mg IntraVENous Q12H    aspirin EC  81 mg Oral Daily    citalopram  20 mg Oral Daily    metoprolol succinate  25 mg Oral Daily    therapeutic multivitamin-minerals  1 tablet Oral Daily     Continuous Infusions:   sodium chloride      sodium chloride 20 mL/hr at 06/10/22 1838    sodium chloride      dextrose 5 % and 0.45 % NaCl 50 mL/hr at 22 2236     PRN Meds:.calcium carbonate, morphine, sodium chloride, diphenhydrAMINE, sodium chloride, sodium chloride flush, sodium chloride, potassium chloride, magnesium sulfate, magnesium hydroxide, Saline Mouthwash, alteplase (CATHFLO) with sterile water injection, prochlorperazine **OR** prochlorperazine, LORazepam **OR** LORazepam, busPIRone, diphenhydrAMINE, hydrOXYzine pamoate    ROS:  As noted above, otherwise remainder of 10-point ROS negative    Physical Exam:     I&O:      Intake/Output Summary (Last 24 hours) at 2022 0910  Last data filed at 2022 0636  Gross per 24 hour   Intake 2083 ml Output 1200 ml   Net 883 ml       Vital Signs:  BP (!) 94/58   Pulse 78   Temp 99 °F (37.2 °C) (Oral)   Resp 16   Ht 5' 1\" (1.549 m)   Wt 175 lb (79.4 kg)   SpO2 97%   BMI 33.07 kg/m²     Weight:    Wt Readings from Last 3 Encounters:   06/17/22 175 lb (79.4 kg)   06/08/22 172 lb 12.8 oz (78.4 kg)   05/27/22 170 lb (77.1 kg)         General: Awake, alert and oriented. HEENT: normocephalic, PERRL, no scleral erythema or icterus, Oral mucosa moist and intact, throat clear  NECK: supple  BACK: Straight   SKIN: warm dry and intact without lesions rashes or masses  CHEST: CTA bilaterally without use of accessory muscles  CV: Normal S1 S2, RRR, no MRG  ABD: NT ND normoactive BS, no palpable masses or hepatosplenomegaly  EXTREMITIES: without edema, denies calf tenderness  NEURO: CN II - XII grossly intact  CATHETER: rt upper chest wall heath intact at the insertion site     Data    CBC:   Recent Labs     06/16/22 0400 06/17/22 0312 06/18/22 0400   WBC 3.4* 1.6* 0.4*   HGB 8.7* 9.2* 9.1*   HCT 25.9* 27.7* 26.4*   .7* 102.1* 101.2*   * 114* 81*     BMP/Mag:  Recent Labs     06/16/22 0400 06/17/22 0312 06/18/22  0400    138 135*   K 3.3* 4.0 4.0    103 100   CO2 28 25 26   PHOS 3.0 3.3 3.4   BUN 14 12 10   CREATININE <0.5* <0.5* 0.5*   MG 2.10  --   --      LIVP:   Recent Labs     06/17/22 0312   AST 34   ALT 89*   BILIDIR <0.2   BILITOT 0.6   ALKPHOS 105     Coags:   Recent Labs     06/16/22 0400   PROTIME 13.8   INR 1.07   APTT 24.4     Uric Acid   Recent Labs     06/16/22 0400 06/17/22 0312 06/18/22  0400   LABURIC 3.2 3.0 2.4*       PROBLEM LIST:             1. [facility] NHL  2. COVID19 PNA  3. Anxiety / H/o Panic Attacks  4. HLD  5. CAD  6. Chronic systolic HF      TREATMENT:            1. R-CHOP x 6 cycles (2/21/21-6/4/21)  - Imbruvica added with cycle #2  2. R-ICE x 2 cycles 3/28/22-4/19/22  3. BEAM followed by Auto SCT on 6/15/22    ASSESSMENT AND PLAN:           1.  [facility] NHL: R/R, now in CR  - PET/CT 5/4/22: CR  - BM Bx/Asp 5/4/22: OMARI     Auto Day +3     2. ID:  Afebrile, no evidence of infection.    - Cont Diflucan & Valtrex ppx; cont acyclovir at discharge for VZV positive titer.    - Start Levaquin ppx 6/17     3. Heme: Pancytopenia 2/2 chemotherapy  - Transfuse for Hgb < 7 and Platelets < 07J  - No transfusion today     4. Metabolic: hypoNa   - Cont IVFs: D51/2 at 50mL/hr  - Replace potassium and magnesium per PRN orders     5. Cardiac: H/o CHF, CAD, HLD  - Cleared for transplant by Dr. Michela Dalal  HLD:   - Does not tolerate statins  - Consider Leqvio after transplant per Dr. Michela Dalal  CAD:   - Angiogram 2019 with borderline lesions but no PCI performed  - ASA until Plts <75K --> STOPPED 5/46   Chronic Systolic HF:   - Echo 5/0/38 w/EF 45-50%  - Stress Test 5/26/22: No evidence of ischemia or scar. LVEF (73%) & LV wall motion is normal  - Cont Toprol XL 25mg daily  --> change to 12mg po bid (due to low normal BP) 6/18   - Will likely benefit from jardiance - cardiology to assess following BMT     6. Pulmonary:   - Pre-Txp PFTs: FEV1/% / DLCOcor 83%  - Encourage IS & ambulation      7. GI / Nutrition:  Appetite and oral intake is good. - Cont low microbial diet   - Follow closely with dietary     8. Psych: H/o panic attacked (heart palpitations & chest discomfort) since 1988  - Cont buspar 15mg BID PRN  - Cont celexa 20mg nightly     9.  MSK:   - PT/OT consulted for prehabilitation / mobility program     - DVT Prophylaxis: Platelets >13,912 cells/dL, - daily lovenox prophylaxis ordered  Contraindications to pharmacologic prophylaxis: None  Contraindications to mechanical prophylaxis: None     - Disposition:  Once ANC >1 and recovered from toxicities of transplant    Siena Thorpe MD

## 2022-06-18 NOTE — PLAN OF CARE
Problem: Safety - Adult  Goal: Free from fall injury  Outcome: Progressing  Note: Orthostatic vital signs obtained at start of shift - see flowsheet for details. Pt does not meet criteria for orthostasis. Pt is a Med fall risk. See Esta Blazing Fall Score and ABCDS Injury Risk assessments. - Screening for Orthostasis AND not a [place] Risk per ROSALES/ABCDS: Pt bed is in low position, side rails up, call light and belongings are in reach. Fall risk light is on outside pts room. Pt encouraged to call for assistance as needed. Will continue with hourly rounds for PO intake, pain needs, toileting and repositioning as needed. Problem: Pain  Goal: Verbalizes/displays adequate comfort level or baseline comfort level  Outcome: Progressing  Note: No complaints of pain noted this shift. Will continue to monitor. Problem: Hematologic - Adult  Goal: Maintains hematologic stability  Outcome: Progressing  Note:   Patient's hemoglobin this AM: Recent Labs     06/18/22  0400   HGB 9.1*     Patient's platelet count this AM:   Recent Labs     06/18/22  0400   PLT 81*    Thrombocytopenia Precautions in place. Patient showing no signs or symptoms of active bleeding. Transfusion not indicated at this time. Patient verbalizes understanding of all instructions. Will continue to assess and implement POC. Call light within reach and hourly rounding in place.

## 2022-06-19 LAB
ANION GAP SERPL CALCULATED.3IONS-SCNC: 9 MMOL/L (ref 3–16)
BILIRUBIN URINE: ABNORMAL
BLOOD, URINE: ABNORMAL
BUN BLDV-MCNC: 10 MG/DL (ref 7–20)
CALCIUM SERPL-MCNC: 8.5 MG/DL (ref 8.3–10.6)
CHLORIDE BLD-SCNC: 103 MMOL/L (ref 99–110)
CLARITY: CLEAR
CO2: 26 MMOL/L (ref 21–32)
COLOR: YELLOW
CREAT SERPL-MCNC: 0.6 MG/DL (ref 0.6–1.1)
GFR AFRICAN AMERICAN: >60
GFR NON-AFRICAN AMERICAN: >60
GLUCOSE BLD-MCNC: 111 MG/DL (ref 70–99)
GLUCOSE URINE: 100 MG/DL
HCT VFR BLD CALC: 24.5 % (ref 36–48)
HEMOGLOBIN: 8.4 G/DL (ref 12–16)
KETONES, URINE: NEGATIVE MG/DL
LEUKOCYTE ESTERASE, URINE: NEGATIVE
MCH RBC QN AUTO: 34.4 PG (ref 26–34)
MCHC RBC AUTO-ENTMCNC: 34.1 G/DL (ref 31–36)
MCV RBC AUTO: 100.8 FL (ref 80–100)
MICROSCOPIC EXAMINATION: YES
NITRITE, URINE: NEGATIVE
PDW BLD-RTO: 14.2 % (ref 12.4–15.4)
PH UA: 6 (ref 5–8)
PHOSPHORUS: 3.3 MG/DL (ref 2.5–4.9)
PLATELET # BLD: 48 K/UL (ref 135–450)
PMV BLD AUTO: 7.3 FL (ref 5–10.5)
POTASSIUM SERPL-SCNC: 3.6 MMOL/L (ref 3.5–5.1)
PROTEIN UA: 30 MG/DL
RBC # BLD: 2.43 M/UL (ref 4–5.2)
SODIUM BLD-SCNC: 138 MMOL/L (ref 136–145)
SPECIFIC GRAVITY UA: >=1.03 (ref 1–1.03)
URIC ACID, SERUM: 2.7 MG/DL (ref 2.6–6)
URINE TYPE: ABNORMAL
UROBILINOGEN, URINE: 1 E.U./DL
WBC # BLD: 0.1 K/UL (ref 4–11)

## 2022-06-19 PROCEDURE — 6370000000 HC RX 637 (ALT 250 FOR IP): Performed by: NURSE PRACTITIONER

## 2022-06-19 PROCEDURE — 6370000000 HC RX 637 (ALT 250 FOR IP): Performed by: INTERNAL MEDICINE

## 2022-06-19 PROCEDURE — 85025 COMPLETE CBC W/AUTO DIFF WBC: CPT

## 2022-06-19 PROCEDURE — 84100 ASSAY OF PHOSPHORUS: CPT

## 2022-06-19 PROCEDURE — 2580000003 HC RX 258: Performed by: INTERNAL MEDICINE

## 2022-06-19 PROCEDURE — 2580000003 HC RX 258: Performed by: NURSE PRACTITIONER

## 2022-06-19 PROCEDURE — 80048 BASIC METABOLIC PNL TOTAL CA: CPT

## 2022-06-19 PROCEDURE — 2060000000 HC ICU INTERMEDIATE R&B

## 2022-06-19 PROCEDURE — 84550 ASSAY OF BLOOD/URIC ACID: CPT

## 2022-06-19 PROCEDURE — 36592 COLLECT BLOOD FROM PICC: CPT

## 2022-06-19 PROCEDURE — 81001 URINALYSIS AUTO W/SCOPE: CPT

## 2022-06-19 RX ORDER — 0.9 % SODIUM CHLORIDE 0.9 %
1000 INTRAVENOUS SOLUTION INTRAVENOUS ONCE
Status: COMPLETED | OUTPATIENT
Start: 2022-06-19 | End: 2022-06-19

## 2022-06-19 RX ADMIN — ONDANSETRON 8 MG: 4 TABLET, ORALLY DISINTEGRATING ORAL at 15:30

## 2022-06-19 RX ADMIN — SODIUM CHLORIDE 1000 ML: 9 INJECTION, SOLUTION INTRAVENOUS at 20:57

## 2022-06-19 RX ADMIN — SODIUM CHLORIDE, PRESERVATIVE FREE 10 ML: 5 INJECTION INTRAVENOUS at 08:23

## 2022-06-19 RX ADMIN — PANTOPRAZOLE SODIUM 40 MG: 40 TABLET, DELAYED RELEASE ORAL at 06:28

## 2022-06-19 RX ADMIN — ONDANSETRON 8 MG: 4 TABLET, ORALLY DISINTEGRATING ORAL at 06:28

## 2022-06-19 RX ADMIN — LEVOFLOXACIN 500 MG: 250 TABLET, FILM COATED ORAL at 20:30

## 2022-06-19 RX ADMIN — DEXTROSE AND SODIUM CHLORIDE: 5; 450 INJECTION, SOLUTION INTRAVENOUS at 20:57

## 2022-06-19 RX ADMIN — ONDANSETRON 8 MG: 4 TABLET, ORALLY DISINTEGRATING ORAL at 22:36

## 2022-06-19 RX ADMIN — PROCHLORPERAZINE MALEATE 10 MG: 10 TABLET ORAL at 08:22

## 2022-06-19 RX ADMIN — VALACYCLOVIR HYDROCHLORIDE 500 MG: 500 TABLET, FILM COATED ORAL at 08:23

## 2022-06-19 RX ADMIN — LORAZEPAM 0.5 MG: 0.5 TABLET ORAL at 20:20

## 2022-06-19 RX ADMIN — VALACYCLOVIR HYDROCHLORIDE 500 MG: 500 TABLET, FILM COATED ORAL at 20:30

## 2022-06-19 RX ADMIN — SODIUM CHLORIDE 15 ML: 900 IRRIGANT IRRIGATION at 06:29

## 2022-06-19 RX ADMIN — FLUCONAZOLE 400 MG: 200 TABLET ORAL at 08:23

## 2022-06-19 RX ADMIN — SODIUM CHLORIDE 15 ML: 900 IRRIGANT IRRIGATION at 20:22

## 2022-06-19 RX ADMIN — LOPERAMIDE HYDROCHLORIDE 2 MG: 2 CAPSULE ORAL at 20:20

## 2022-06-19 RX ADMIN — DEXTROSE AND SODIUM CHLORIDE: 5; 450 INJECTION, SOLUTION INTRAVENOUS at 09:19

## 2022-06-19 RX ADMIN — CITALOPRAM HYDROBROMIDE 20 MG: 20 TABLET ORAL at 20:20

## 2022-06-19 RX ADMIN — MULTIPLE VITAMINS W/ MINERALS TAB 1 TABLET: TAB at 08:23

## 2022-06-19 RX ADMIN — SODIUM CHLORIDE, PRESERVATIVE FREE 10 ML: 5 INJECTION INTRAVENOUS at 20:20

## 2022-06-19 ASSESSMENT — PAIN SCALES - GENERAL
PAINLEVEL_OUTOF10: 0

## 2022-06-19 NOTE — PLAN OF CARE
Problem: Pain  Goal: Verbalizes/displays adequate comfort level or baseline comfort level  Outcome: Progressing  Flowsheets (Taken 6/14/2022 1645 by Itz Hughes RN)  Verbalizes/displays adequate comfort level or baseline comfort level: Encourage patient to monitor pain and request assistance  Note: Patient had complaint of stomach cramping at beginning of shift. Stool sent to rule out Cdiff and it was not detected. Patient was then given initial dose of imodium. Patient stated that stomach is no longer cramping at this time. Will continue to monitor comfort. Problem: Infection - Adult  Goal: Absence of fever/infection during anticipated neutropenic period  Outcome: Progressing  Flowsheets (Taken 6/19/2022 0646)  Absence of fever/infection during anticipated neutropenic period: Implement neutropenic guidelines  Note: Patient has been afebrile this shift. WBC are 0.1 this AM.  High touch wipe downs completed each shift. Patient educated on importance of good hand hygiene. Will continue to monitor for infection. Problem: Hematologic - Adult  Goal: Maintains hematologic stability  Flowsheets (Taken 6/19/2022 0646)  Maintains hematologic stability: Administer blood products/factors as ordered  Note: Patient's hemoglobin this AM:   Recent Labs     06/19/22  0430   HGB 8.4*     Patient's platelet count this AM:   Recent Labs     06/19/22  0430   PLT 48*    Thrombocytopenia Precautions in place. Patient showing no signs or symptoms of active bleeding. Transfusion not indicated at this time. Patient verbalizes understanding of all instructions. Will continue to assess and implement POC. Call light within reach and hourly rounding in place.

## 2022-06-19 NOTE — PROGRESS NOTES
Patient informed RN this evening that she loose watery stool all day. She had just had her 3rd one in the hat at shift change and was told that the next one would need to be sent for a sample. Stool collected, placed on ice and walked to lab. Result noted and Cdiff was not detected. Patient was removed from isolation and Imodium was started per BMT Cdiff/Diarrhea protocol.

## 2022-06-19 NOTE — PROGRESS NOTES
Dr. Moses Trinidad notified that patients Orthostatic BP this evening was positive. Lying  99/57 HR 59,  Sitting  72/57   and standing 70/42  . Patient declined any dizziness or symptoms at this time. Bed alarm activated for patient safety and she was educated on importance of using call light and to have someone with her while ambulating. New order to give NS bolus 1 liter over 2 hours, then to increase MIVF's to 100 ml/hr after bolus infused.

## 2022-06-19 NOTE — PROGRESS NOTES
1820 ml   Net 869 ml       Vital Signs:  /61   Pulse 99   Temp 98.5 °F (36.9 °C) (Oral)   Resp 18   Ht 5' 1\" (1.549 m)   Wt 173 lb 3.2 oz (78.6 kg)   SpO2 99%   BMI 32.73 kg/m²     Weight:    Wt Readings from Last 3 Encounters:   06/19/22 173 lb 3.2 oz (78.6 kg)   06/08/22 172 lb 12.8 oz (78.4 kg)   05/27/22 170 lb (77.1 kg)         General: Awake, alert and oriented. HEENT: normocephalic, PERRL, no scleral erythema or icterus, Oral mucosa moist and intact, throat clear  NECK: supple  BACK: Straight   SKIN: warm dry and intact without lesions rashes or masses  CHEST: CTA bilaterally without use of accessory muscles  CV: Normal S1 S2, RRR, no MRG  ABD: NT ND normoactive BS, no palpable masses or hepatosplenomegaly  EXTREMITIES: without edema, denies calf tenderness  NEURO: CN II - XII grossly intact  CATHETER: rt upper chest wall heath intact at the insertion site     Data    CBC:   Recent Labs     06/17/22 0312 06/18/22  0400 06/19/22  0430   WBC 1.6* 0.4* 0.1*   HGB 9.2* 9.1* 8.4*   HCT 27.7* 26.4* 24.5*   .1* 101.2* 100.8*   * 81* 48*     BMP/Mag:  Recent Labs     06/17/22 0312 06/18/22  0400 06/19/22  0430    135* 138   K 4.0 4.0 3.6    100 103   CO2 25 26 26   PHOS 3.3 3.4 3.3   BUN 12 10 10   CREATININE <0.5* 0.5* 0.6     LIVP:   Recent Labs     06/17/22 0312   AST 34   ALT 89*   BILIDIR <0.2   BILITOT 0.6   ALKPHOS 105     Coags:   No results for input(s): PROTIME, INR, APTT in the last 72 hours. Uric Acid   Recent Labs     06/17/22 0312 06/18/22  0400 06/19/22  0430   LABURIC 3.0 2.4* 2.7       PROBLEM LIST:             1. DLB NHL  2. COVID19 PNA  3. Anxiety / H/o Panic Attacks  4. HLD  5. CAD  6. Chronic systolic HF      TREATMENT:            1. R-CHOP x 6 cycles (2/21/21-6/4/21)  - Imbruvica added with cycle #2  2. R-ICE x 2 cycles 3/28/22-4/19/22  3. BEAM followed by Auto SCT on 6/15/22    ASSESSMENT AND PLAN:           1.  [facility] NHL: R/R, now in CR  - PET/CT 5/4/22: CR  - BM Bx/Asp 5/4/22: OMARI     Auto Day +4     2. ID:  Afebrile, no evidence of infection.    - Cont Diflucan & Valtrex ppx; cont acyclovir at discharge for VZV positive titer.    - Start Levaquin ppx 6/17     3. Heme: Pancytopenia 2/2 chemotherapy  - Transfuse for Hgb < 7 and Platelets < 52U  - No transfusion today     4. Metabolic: nl lytes + hyperglycemia   - Cont IVFs: D51/2 at 50mL/hr --> increased to 100ml/hr   - Replace potassium and magnesium per PRN orders     5. Cardiac: H/o CHF, CAD, HLD  - Cleared for transplant by Dr. Nomi Rico  HLD:   - Does not tolerate statins  - Consider Leqvio after transplant per Dr. Nomi Rico  CAD:   - Angiogram 2019 with borderline lesions but no PCI performed  - ASA STOPPED 6/85   Chronic Systolic HF:   - Echo 6/3/16 w/EF 45-50%  - Stress Test 5/26/22: No evidence of ischemia or scar. LVEF (73%) & LV wall motion is normal  - Toprol XL 25mg daily  --> change to 12 mg po bid (due to low normal BP) 6/18  --> STOPPED 6/18 in the evening   - Will likely benefit from jardiance - cardiology to assess following BMT     6. Pulmonary:   - Pre-Txp PFTs: FEV1/% / DLCOcor 83%  - Encourage IS & ambulation      7. GI / Nutrition:  Appetite and oral intake is good. - Cont low microbial diet   - Follow closely with dietary     8. Psych: H/o panic attacked (heart palpitations & chest discomfort) since 1988  - Cont buspar 15mg BID PRN  - Cont celexa 20mg nightly     9.  MSK:   - PT/OT consulted for prehabilitation / mobility program     - DVT Prophylaxis: Platelets >80,751 cells/dL, - daily lovenox prophylaxis ordered  Contraindications to pharmacologic prophylaxis: None  Contraindications to mechanical prophylaxis: None     - Disposition:  Once ANC >1 and recovered from toxicities of transplant    Agustín Tobar MD

## 2022-06-20 ENCOUNTER — APPOINTMENT (OUTPATIENT)
Dept: GENERAL RADIOLOGY | Age: 52
DRG: 016 | End: 2022-06-20
Attending: INTERNAL MEDICINE
Payer: COMMERCIAL

## 2022-06-20 LAB
ALBUMIN SERPL-MCNC: 3.2 G/DL (ref 3.4–5)
ALP BLD-CCNC: 98 U/L (ref 40–129)
ALT SERPL-CCNC: 35 U/L (ref 10–40)
ANION GAP SERPL CALCULATED.3IONS-SCNC: 11 MMOL/L (ref 3–16)
APTT: 32.3 SEC (ref 23–34.3)
AST SERPL-CCNC: 11 U/L (ref 15–37)
BACTERIA: ABNORMAL /HPF
BILIRUB SERPL-MCNC: 1.1 MG/DL (ref 0–1)
BILIRUBIN DIRECT: 0.7 MG/DL (ref 0–0.3)
BILIRUBIN URINE: NEGATIVE
BILIRUBIN, INDIRECT: 0.4 MG/DL (ref 0–1)
BLOOD, URINE: ABNORMAL
BUN BLDV-MCNC: 4 MG/DL (ref 7–20)
CALCIUM SERPL-MCNC: 8.1 MG/DL (ref 8.3–10.6)
CHLORIDE BLD-SCNC: 102 MMOL/L (ref 99–110)
CLARITY: CLEAR
CO2: 22 MMOL/L (ref 21–32)
COLOR: YELLOW
CREAT SERPL-MCNC: 0.5 MG/DL (ref 0.6–1.1)
EPITHELIAL CELLS, UA: ABNORMAL /HPF (ref 0–5)
EPITHELIAL CELLS, UA: NORMAL /HPF (ref 0–5)
GFR AFRICAN AMERICAN: >60
GFR NON-AFRICAN AMERICAN: >60
GLUCOSE BLD-MCNC: 155 MG/DL (ref 70–99)
GLUCOSE URINE: NEGATIVE MG/DL
HCT VFR BLD CALC: 22.6 % (ref 36–48)
HEMOGLOBIN: 7.7 G/DL (ref 12–16)
INR BLD: 1.26 (ref 0.87–1.14)
KETONES, URINE: NEGATIVE MG/DL
LACTATE DEHYDROGENASE: 135 U/L (ref 100–190)
LACTIC ACID: 1.1 MMOL/L (ref 0.4–2)
LEUKOCYTE ESTERASE, URINE: NEGATIVE
MAGNESIUM: 1.5 MG/DL (ref 1.8–2.4)
MCH RBC QN AUTO: 34.3 PG (ref 26–34)
MCHC RBC AUTO-ENTMCNC: 34.1 G/DL (ref 31–36)
MCV RBC AUTO: 100.7 FL (ref 80–100)
MICROSCOPIC EXAMINATION: YES
NITRITE, URINE: NEGATIVE
PDW BLD-RTO: 14 % (ref 12.4–15.4)
PH UA: 6 (ref 5–8)
PHOSPHORUS: 1.7 MG/DL (ref 2.5–4.9)
PLATELET # BLD: 16 K/UL (ref 135–450)
PMV BLD AUTO: 6.9 FL (ref 5–10.5)
POTASSIUM SERPL-SCNC: 3.5 MMOL/L (ref 3.5–5.1)
PROCALCITONIN: 0.25 NG/ML (ref 0–0.15)
PROTEIN UA: ABNORMAL MG/DL
PROTHROMBIN TIME: 15.8 SEC (ref 11.7–14.5)
RBC # BLD: 2.24 M/UL (ref 4–5.2)
RBC UA: ABNORMAL /HPF (ref 0–4)
RBC UA: NORMAL /HPF (ref 0–4)
RENAL EPITHELIAL, UA: ABNORMAL /HPF (ref 0–1)
SODIUM BLD-SCNC: 135 MMOL/L (ref 136–145)
SPECIFIC GRAVITY UA: 1.01 (ref 1–1.03)
TOTAL PROTEIN: 4.8 G/DL (ref 6.4–8.2)
URIC ACID, SERUM: 1.6 MG/DL (ref 2.6–6)
URINE TYPE: ABNORMAL
UROBILINOGEN, URINE: 0.2 E.U./DL
WBC # BLD: 0.1 K/UL (ref 4–11)
WBC UA: ABNORMAL /HPF (ref 0–5)
WBC UA: NORMAL /HPF (ref 0–5)

## 2022-06-20 PROCEDURE — 83735 ASSAY OF MAGNESIUM: CPT

## 2022-06-20 PROCEDURE — 97110 THERAPEUTIC EXERCISES: CPT

## 2022-06-20 PROCEDURE — 80048 BASIC METABOLIC PNL TOTAL CA: CPT

## 2022-06-20 PROCEDURE — 85610 PROTHROMBIN TIME: CPT

## 2022-06-20 PROCEDURE — 6370000000 HC RX 637 (ALT 250 FOR IP): Performed by: NURSE PRACTITIONER

## 2022-06-20 PROCEDURE — 87102 FUNGUS ISOLATION CULTURE: CPT

## 2022-06-20 PROCEDURE — 6360000002 HC RX W HCPCS: Performed by: NURSE PRACTITIONER

## 2022-06-20 PROCEDURE — 97530 THERAPEUTIC ACTIVITIES: CPT

## 2022-06-20 PROCEDURE — 2580000003 HC RX 258: Performed by: NURSE PRACTITIONER

## 2022-06-20 PROCEDURE — 80076 HEPATIC FUNCTION PANEL: CPT

## 2022-06-20 PROCEDURE — 87205 SMEAR GRAM STAIN: CPT

## 2022-06-20 PROCEDURE — 87103 BLOOD FUNGUS CULTURE: CPT

## 2022-06-20 PROCEDURE — 36592 COLLECT BLOOD FROM PICC: CPT

## 2022-06-20 PROCEDURE — 85025 COMPLETE CBC W/AUTO DIFF WBC: CPT

## 2022-06-20 PROCEDURE — 87040 BLOOD CULTURE FOR BACTERIA: CPT

## 2022-06-20 PROCEDURE — 87070 CULTURE OTHR SPECIMN AEROBIC: CPT

## 2022-06-20 PROCEDURE — 6360000002 HC RX W HCPCS: Performed by: INTERNAL MEDICINE

## 2022-06-20 PROCEDURE — 83605 ASSAY OF LACTIC ACID: CPT

## 2022-06-20 PROCEDURE — 84145 PROCALCITONIN (PCT): CPT

## 2022-06-20 PROCEDURE — 81001 URINALYSIS AUTO W/SCOPE: CPT

## 2022-06-20 PROCEDURE — 2060000000 HC ICU INTERMEDIATE R&B

## 2022-06-20 PROCEDURE — 71045 X-RAY EXAM CHEST 1 VIEW: CPT

## 2022-06-20 PROCEDURE — 85730 THROMBOPLASTIN TIME PARTIAL: CPT

## 2022-06-20 PROCEDURE — 97535 SELF CARE MNGMENT TRAINING: CPT

## 2022-06-20 PROCEDURE — 2500000003 HC RX 250 WO HCPCS: Performed by: NURSE PRACTITIONER

## 2022-06-20 PROCEDURE — 84100 ASSAY OF PHOSPHORUS: CPT

## 2022-06-20 PROCEDURE — 84550 ASSAY OF BLOOD/URIC ACID: CPT

## 2022-06-20 PROCEDURE — 6370000000 HC RX 637 (ALT 250 FOR IP): Performed by: INTERNAL MEDICINE

## 2022-06-20 PROCEDURE — 83615 LACTATE (LD) (LDH) ENZYME: CPT

## 2022-06-20 RX ORDER — SODIUM CHLORIDE 9 MG/ML
INJECTION, SOLUTION INTRAVENOUS CONTINUOUS
Status: DISCONTINUED | OUTPATIENT
Start: 2022-06-20 | End: 2022-06-22

## 2022-06-20 RX ADMIN — SODIUM CHLORIDE: 9 INJECTION, SOLUTION INTRAVENOUS at 12:47

## 2022-06-20 RX ADMIN — MULTIPLE VITAMINS W/ MINERALS TAB 1 TABLET: TAB at 08:58

## 2022-06-20 RX ADMIN — LOPERAMIDE HYDROCHLORIDE 2 MG: 2 CAPSULE ORAL at 08:58

## 2022-06-20 RX ADMIN — SODIUM CHLORIDE 15 ML: 900 IRRIGANT IRRIGATION at 06:43

## 2022-06-20 RX ADMIN — ONDANSETRON 8 MG: 4 TABLET, ORALLY DISINTEGRATING ORAL at 14:30

## 2022-06-20 RX ADMIN — VALACYCLOVIR HYDROCHLORIDE 500 MG: 500 TABLET, FILM COATED ORAL at 22:17

## 2022-06-20 RX ADMIN — FLUCONAZOLE 400 MG: 200 TABLET ORAL at 08:58

## 2022-06-20 RX ADMIN — VALACYCLOVIR HYDROCHLORIDE 500 MG: 500 TABLET, FILM COATED ORAL at 08:58

## 2022-06-20 RX ADMIN — SODIUM CHLORIDE, PRESERVATIVE FREE 10 ML: 5 INJECTION INTRAVENOUS at 22:17

## 2022-06-20 RX ADMIN — BUSPIRONE HYDROCHLORIDE 15 MG: 5 TABLET ORAL at 13:07

## 2022-06-20 RX ADMIN — CITALOPRAM HYDROBROMIDE 20 MG: 20 TABLET ORAL at 22:16

## 2022-06-20 RX ADMIN — LEVOFLOXACIN 500 MG: 250 TABLET, FILM COATED ORAL at 22:17

## 2022-06-20 RX ADMIN — MORPHINE SULFATE 2 MG: 2 INJECTION, SOLUTION INTRAMUSCULAR; INTRAVENOUS at 16:42

## 2022-06-20 RX ADMIN — BUSPIRONE HYDROCHLORIDE 15 MG: 5 TABLET ORAL at 22:17

## 2022-06-20 RX ADMIN — ONDANSETRON 8 MG: 4 TABLET, ORALLY DISINTEGRATING ORAL at 06:42

## 2022-06-20 RX ADMIN — MORPHINE SULFATE 2 MG: 2 INJECTION, SOLUTION INTRAMUSCULAR; INTRAVENOUS at 22:29

## 2022-06-20 RX ADMIN — POTASSIUM PHOSPHATE, MONOBASIC AND POTASSIUM PHOSPHATE, DIBASIC 30 MMOL: 224; 236 INJECTION, SOLUTION, CONCENTRATE INTRAVENOUS at 09:18

## 2022-06-20 RX ADMIN — LOPERAMIDE HYDROCHLORIDE 2 MG: 2 CAPSULE ORAL at 22:16

## 2022-06-20 RX ADMIN — SODIUM CHLORIDE, PRESERVATIVE FREE 10 ML: 5 INJECTION INTRAVENOUS at 09:00

## 2022-06-20 RX ADMIN — FILGRASTIM-AAFI 300 MCG: 300 INJECTION, SOLUTION SUBCUTANEOUS at 18:17

## 2022-06-20 RX ADMIN — SODIUM CHLORIDE: 9 INJECTION, SOLUTION INTRAVENOUS at 19:30

## 2022-06-20 RX ADMIN — ONDANSETRON 8 MG: 2 INJECTION INTRAMUSCULAR; INTRAVENOUS at 22:16

## 2022-06-20 RX ADMIN — PANTOPRAZOLE SODIUM 40 MG: 40 TABLET, DELAYED RELEASE ORAL at 06:42

## 2022-06-20 ASSESSMENT — PAIN DESCRIPTION - PAIN TYPE
TYPE: ACUTE PAIN
TYPE: ACUTE PAIN

## 2022-06-20 ASSESSMENT — PAIN SCALES - GENERAL
PAINLEVEL_OUTOF10: 7
PAINLEVEL_OUTOF10: 4
PAINLEVEL_OUTOF10: 7
PAINLEVEL_OUTOF10: 4
PAINLEVEL_OUTOF10: 5
PAINLEVEL_OUTOF10: 0

## 2022-06-20 ASSESSMENT — PAIN DESCRIPTION - DESCRIPTORS
DESCRIPTORS: CRAMPING

## 2022-06-20 ASSESSMENT — PAIN DESCRIPTION - LOCATION
LOCATION: ABDOMEN

## 2022-06-20 ASSESSMENT — PAIN - FUNCTIONAL ASSESSMENT
PAIN_FUNCTIONAL_ASSESSMENT: PREVENTS OR INTERFERES SOME ACTIVE ACTIVITIES AND ADLS
PAIN_FUNCTIONAL_ASSESSMENT: PREVENTS OR INTERFERES SOME ACTIVE ACTIVITIES AND ADLS

## 2022-06-20 ASSESSMENT — PAIN DESCRIPTION - ORIENTATION
ORIENTATION: MID
ORIENTATION: INNER;MID

## 2022-06-20 ASSESSMENT — PAIN DESCRIPTION - ONSET: ONSET: ON-GOING

## 2022-06-20 ASSESSMENT — PAIN SCALES - WONG BAKER: WONGBAKER_NUMERICALRESPONSE: 0

## 2022-06-20 ASSESSMENT — PAIN DESCRIPTION - FREQUENCY: FREQUENCY: INTERMITTENT

## 2022-06-20 NOTE — CARE COORDINATION
Autologous Transplant     Dx: Diffuse Large B Cell Lymphoma     Central Line:    5/27/22:  CVC Triple Lumen Right Subclavian    Hx:  CAD, HLD, Anxiety (history of panic attacks), COVID PNA    Treatment Plan:  BEAM   6/15/22:  Autologous Stem Cell       Update:Patient educated on stem cell transplant, counts dropping. Patient has been keeping busy and has brought snacks in to keep in her room. Patient encouraged to frequently wash her hands to prevent infection. Reviewed the use of Mouth Rinse and walking on the unit. 6/15/22:  Patient received cells today. Checked on patient during transplant pt reports all is going well is she is feeling good. Reviewed transplant timeline with patient. 6/20/22: On rounds patient reported feeling tired and pain in abdomen. Pt reports that she is drinking and eating small snacks throughout the day. Patient had BP issues over the weekend and received 1 l L Bolus. Blood cultures  Urine culture ordered by team. Patient encouraged to remain active getting up to the chair and walking. Patient attended prehab prior to admission. Psychologist will be rounding on patient during inpatient stay. Education provided on cryotherapy with Melphalan infusion. Discharge Plan: when counts recover patient is eating drinking and walking and afebrile. Pending: Patient will be provided lodging after transplant SW aware.

## 2022-06-20 NOTE — PLAN OF CARE
Problem: Safety - Adult  Goal: Free from fall injury  Flowsheets (Taken 6/20/2022 1935)  Free From Fall Injury: Instruct family/caregiver on patient safety  Note: Orthostatic vital signs obtained at start of shift - see flowsheet for details. Pt meets criteria for orthostasis. Pt is a Med fall risk. [facility] and ABCDS Injury Risk assessments.   + Screening for Orthostasis and/or + High Fall Risk per ROSALES/ABCDS: Explained fall risk precautions to pt and family and rationale behind their use to keep the patient safe. Pt bed is in low position, side rails up, call light and belongings are in reach. Fall wristband applied and present on pts wrist.  Bed alarm on. Pt encouraged to call for assistance. Will continue with hourly rounds for PO intake, pain needs, toileting and repositioning as needed. - Screening for Orthostasis AND not a [place] Risk per ROSALES/ABCDS: Pt bed is in low position, side rails up, call light and belongings are in reach. Fall risk light is on outside pts room. Pt encouraged to call for assistance as needed. Will continue with hourly rounds for PO intake, pain needs, toileting and repositioning as needed. Problem: ABCDS Injury Assessment  Goal: Absence of physical injury  Recent Flowsheet Documentation  Taken 6/20/2022 1935 by Belia Zarate RN  Absence of Physical Injury: Implement safety measures based on patient assessment     Problem: Pain  Goal: Verbalizes/displays adequate comfort level or baseline comfort level  Note: Client ranked pain 7 on 0-10 scale.   Treated with Prn morphine per MD order     Problem: Infection - Adult  Goal: Absence of infection during hospitalization  Flowsheets (Taken 6/20/2022 1936)  Absence of infection during hospitalization: Assess and monitor for signs and symptoms of infection     Problem: Hematologic - Adult  Goal: Maintains hematologic stability  Note: Patient's hemoglobin this AM:   Recent Labs     06/20/22  0402   HGB 7.7* Patient's platelet count this AM:   Recent Labs     06/20/22  0402   PLT 16*    Thrombocytopenia Precautions in place. Patient showing no signs or symptoms of active bleeding. Transfusion not indicated at this time. Patient verbalizes understanding of all instructions. Will continue to assess and implement POC. Call light within reach and hourly rounding in place.

## 2022-06-20 NOTE — PROGRESS NOTES
Occupational Therapy  Facility/Department: [address] CANCER [place]  Daily Treatment Note  NAME: Britni Roche  : 1970  MRN: [id number]    Date of Service: 2022    Assessment: [facility]. Noted today pt orthostatic BP, and diarrhea. pt being reassessed and was seen to have decreased activity tolerance for OOB activities. Pt completed functional mobility and self-care w/ supervision. pt completed yellow exercises seated. pt required rest breaks throughout. Anticipate pt to continue to make good progress throughout stay. Recommend pt return home w/ 24hr A. Increased pt POC throughout stay. Will follow new POC throughout stay. Discharge Recommendations: Britni Roche scored a 22/24 on the AM-PAC ADL Inpatient form. Current research shows that an AM-PAC score of 18 or greater is typically associated with a discharge to the patient's home setting. Based on the patient's AM-PAC score, and their current ADL deficits, it is recommended that the patient have 2-3 sessions per week of Occupational Therapy at d/c to increase the patient's independence. At this time, this patient demonstrates the endurance and safety to discharge home with 24hr A and a follow up treatment frequency of 2-3x/wk. Please see assessment section for further patient specific details. If patient discharges prior to next session this note will serve as a discharge summary. Please see below for the latest assessment towards goals. OT Equipment Recommendations  Equipment Needed: No  Other: continue to assess      Patient Diagnosis(es): There were no encounter diagnoses. Assessment      Activity Tolerance: Patient tolerated evaluation without incident;Patient limited by fatigue  Equipment Needed: No  Other: continue to assess      Plan   Plan  Times per Week: 2-5x  Current Treatment Recommendations: Strengthening;Balance training;Functional mobility training; Endurance training; Safety education & training;Self-Care / ADL; Home management training     Restrictions       Subjective   Subjective  Subjective: pt in bed upon entry. pt was pleasant and cooperative w/ therapy. pt reports being in bed most of the last few days. additional pertinent history: 45 y.o. F being admitted 6/9 for chemotherapy followed by Auto transplant. PMH: CAD, HLD, Chronic HFrEF, & anxiety  Pain: pt did not report pain  Orientation  Overall Orientation Status: Within Normal Limits  Cognition  Overall Cognitive Status: WNL        Objective    Vitals     Bed Mobility Training  Bed Mobility Training: Yes  Rolling: Independent  Supine to Sit: Supervision  Scooting: Modified independent    Balance  Sitting:  (independent sitting on toilet and eob. 2 min + 2 min)  Standing:  (supervision standing at EOB, toilet and sink 1 min + 1 min + 3 min)    Transfer Training  Transfer Training: Yes  Sit to Stand: Supervision  Stand to Sit: Supervision  Toilet Transfer: Supervision    Gait  Overall Level of Assistance: Supervision (to/from bathroom. pt holding on to IV pole for support. steady pace, pt fatigued w/ ambulation)     ADL  Feeding: Modified independent ;Setup (heated up soup for pt)  Grooming: Supervision (stood at sink for hand hygiene)  LE Dressing: Supervision  Toileting: Supervision  OT Exercises  A/AROM Exercises: Yellow home exercise program, 1 set of each. educated on red. pt demonstrated and verb understanding. Safety Devices  Type of Devices: Call light within reach; Left in bed; Chair alarm in place;Nurse notified     Patient Education  Education Given To: Patient  Education Provided: Role of Therapy;Plan of Care;Home Exercise Program  Education Provided Comments: education provided regarding BCC mobility program and activity log  Education Method: Verbal  Barriers to Learning: None  Education Outcome: Verbalized understanding    Goals  Short Term Goals  Time Frame for Short term goals: Discharge    Short Term

## 2022-06-20 NOTE — PLAN OF CARE
Problem: Safety - Adult  Goal: Free from fall injury  Outcome: Progressing  Note: Orthostatic vital signs obtained at start of shift - see flowsheet for details. Pt does not meet criteria for orthostasis. Pt is a Med fall risk. See [facility] Fall Score and ABCDS Injury Risk assessments. - Screening for Orthostasis AND not a [place] Risk per ROSALES/ABCDS: Pt bed is in low position, side rails up, call light and belongings are in reach. Fall risk light is on outside pts room. Pt encouraged to call for assistance as needed. Will continue with hourly rounds for PO intake, pain needs, toileting and repositioning as needed. Problem: Pain  Goal: Verbalizes/displays adequate comfort level or baseline comfort level  Outcome: Progressing  Note: No complaints of pain noted this shift. Will continue to monitor. Problem: Hematologic - Adult  Goal: Maintains hematologic stability  Outcome: Progressing  Note:   Patient's hemoglobin this AM: Recent Labs     06/20/22  0402   HGB 7.7*     Patient's platelet count this AM:   Recent Labs     06/20/22  0402   PLT 16*    Thrombocytopenia Precautions in place. Patient showing no signs or symptoms of active bleeding. Transfusion not indicated at this time. Patient verbalizes understanding of all instructions. Will continue to assess and implement POC. Call light within reach and hourly rounding in place.

## 2022-06-20 NOTE — PROGRESS NOTES
[address] [employer] Progress Note    2022     Abel Lazo    MRN: [id number]    : 1970    Referring MD: No referring provider defined for this encounter. SUBJECTIVE:     ECOG PS:  (1) Restricted in physically strenuous activity, ambulatory and able to do work of light nature    KPS: 80% Normal activity with effort; some signs or symptoms of disease    Isolation: None    Medications    Scheduled Meds:   levoFLOXacin  500 mg Oral Nightly    ondansetron  8 mg Oral 3 times per day    Or    ondansetron  8 mg IntraVENous 3 times per day    pantoprazole  40 mg Oral QAM AC    filgrastim-aafi  300 mcg SubCUTAneous QPM    sodium chloride flush  5-40 mL IntraVENous 2 times per day    Saline Mouthwash  15 mL Swish & Spit 4x Daily AC & HS    valACYclovir  500 mg Oral BID    fluconazole  400 mg Oral Daily    citalopram  20 mg Oral Daily    therapeutic multivitamin-minerals  1 tablet Oral Daily     Continuous Infusions:   sodium chloride      sodium chloride 20 mL/hr at 06/10/22 1838    sodium chloride      dextrose 5 % and 0.45 % NaCl 100 mL/hr at 22     PRN Meds:. [COMPLETED] loperamide **FOLLOWED BY** loperamide, calcium carbonate, morphine, sodium chloride, diphenhydrAMINE, sodium chloride, sodium chloride flush, sodium chloride, potassium chloride, magnesium sulfate, magnesium hydroxide, Saline Mouthwash, alteplase (CATHFLO) with sterile water injection, prochlorperazine **OR** prochlorperazine, LORazepam **OR** LORazepam, busPIRone, diphenhydrAMINE, hydrOXYzine pamoate    ROS:  As noted above, otherwise remainder of 10-point ROS negative    Physical Exam:     I&O:      Intake/Output Summary (Last 24 hours) at 2022 0800  Last data filed at 2022 0347  Gross per 24 hour   Intake 2069 ml   Output 2425 ml   Net -356 ml       Vital Signs:  BP (!) 108/54   Pulse 89   Temp 98.6 °F (37 °C) (Oral)   Resp 18   Ht 5' 1\" (1.549 m)   Wt 173 lb 3.2 oz (78.6 kg)   SpO2 98%   BMI 32.73 kg/m²     Weight:    Wt Readings from Last 3 Encounters:   06/19/22 173 lb 3.2 oz (78.6 kg)   06/08/22 172 lb 12.8 oz (78.4 kg)   05/27/22 170 lb (77.1 kg)     General: Awake, alert and oriented. HEENT: normocephalic, PERRL, no scleral erythema or icterus, Oral mucosa moist and intact, throat clear  NECK: supple  BACK: Straight   SKIN: warm dry and intact without lesions rashes or masses  CHEST: CTA bilaterally without use of accessory muscles  CV: Normal S1 S2, RRR, no MRG  ABD: mild tenderness to palpation, no rebound or guarsing, normoactive BS, no palpable masses or hepatosplenomegaly  EXTREMITIES: without edema, denies calf tenderness  NEURO: CN II - XII grossly intact  CATHETER: rt upper chest wall heath intact at the insertion site     Data    CBC:   Recent Labs     06/18/22 0400 06/19/22 0430 06/20/22 0402   WBC 0.4* 0.1* 0.1*   HGB 9.1* 8.4* 7.7*   HCT 26.4* 24.5* 22.6*   .2* 100.8* 100.7*   PLT 81* 48* 16*     BMP/Mag:  Recent Labs     06/18/22 0400 06/19/22 0430 06/20/22  0402   * 138 135*   K 4.0 3.6 3.5    103 102   CO2 26 26 22   PHOS 3.4 3.3 1.7*   BUN 10 10 4*   CREATININE 0.5* 0.6 0.5*   MG  --   --  1.50*     LIVP:   Recent Labs     06/20/22 0402   AST 11*   ALT 35   BILIDIR 0.7*   BILITOT 1.1*   ALKPHOS 98     Coags:   Recent Labs     06/20/22 0402   PROTIME 15.8*   INR 1.26*   APTT 32.3     Uric Acid   Recent Labs     06/18/22 0400 06/19/22 0430 06/20/22 0402   LABURIC 2.4* 2.7 1.6*       PROBLEM LIST:             1. DLBC NHL  2. COVID19 PNA  3. Anxiety / H/o Panic Attacks  4. HLD  5. CAD  6. Chronic systolic HF      TREATMENT:            1. R-CHOP x 6 cycles (2/21/21-6/4/21)  - Imbruvica added with cycle #2  2. R-ICE x 2 cycles 3/28/22-4/19/22  3. BEAM followed by Auto SCT on 6/15/22    ASSESSMENT AND PLAN:           1. DLBC NHL: R/R, now in CR  - PET/CT 5/4/22: CR  - BM Bx/Asp 5/4/22: OMARI     Auto Day +5     2.  ID:  Afebrile but low nl BPs overnight   - draw one set of blood Cx (empirically for low BPs)    - if fever will start zosyn     - Cont Diflucan & Valtrex ppx; cont acyclovir at discharge for VZV positive titer.    - Start Levaquin ppx 6/17  - UA w/bacteria & blood. Check UCx 6/20 - pending     3. Heme: Pancytopenia 2/2 chemotherapy  - Transfuse for Hgb < 7 and Platelets < 75S  - No transfusion today  - Start daily GCSF (5/36/94)     4. Metabolic: HypoNa, HypoMg, Hyperglycemia, HypoPhos  - Cont IVFs: D51/2 at 100ml/hr   - Replace K+, Mg, & Phos per PRN orders     5. Cardiac: H/o CHF, CAD, HLD  - Cleared for transplant by Dr. Kylie Beebe  HLD:   - Does not tolerate statins  - Consider Leqvio after transplant per Dr. Kylie Beebe  CAD:   - Angiogram 2019 with borderline lesions but no PCI performed  - ASA STOPPED 3/89   Chronic Systolic HF:   - Echo 0/5/06 w/EF 45-50%  - Stress Test 5/26/22: No evidence of ischemia or scar. LVEF (73%) & LV wall motion is normal  - HOLD Toprol XL 25mg daily   - Will likely benefit from jardiance - cardiology to assess following BMT     6. Pulmonary:   - Pre-Txp PFTs: FEV1/% / DLCOcor 83%  - Encourage IS & ambulation      7. GI / Nutrition:  Appetite and oral intake is good  - Cont low microbial diet   - Follow closely with dietary  - Cont MVI  Diarrhea:  - C.diff neg 6/18  - Imodium PRN  Nausea:   - Cont scheduled zofran  - Compazine PRN  - Cont PPI daily      8. Psych: H/o panic attacked (heart palpitations & chest discomfort) since 1988  - Cont buspar 15mg BID PRN  - Cont celexa 20mg daily     9. MSK:   - PT/OT consulted for prehabilitation / mobility program    - DVT Prophylaxis: Platelets <39,186 cells/dL - prophylactic lovenox on hold and mechanical prophylaxis with bilateral SCDs while in bed in place.   Contraindications to pharmacologic prophylaxis: Thrombocytopenia  Contraindications to mechanical prophylaxis: None      - Disposition:  Once ANC >1 and recovered from toxicities of transplant    Camille Sommer, [address] Annie Haas MD

## 2022-06-20 NOTE — PROGRESS NOTES
Behavioral Health Intervention Note    Patient expressed that she is doing well emotionally although she has had some challenging days in the past because she has not felt well. Patient stated that she has been trying to improve her mood by utilizing her social supports and following the treatment teams recommendations. Writer encouraged patient to continue to do so and remember her values. Interventions: Supportive listening, Assessment of current needs, Identifying values     --------------------------------------------------------------------------------------------    At initial encounter with patient, discussed role of psychologist including addressing emotional and behavioral needs while receiving care at the [facility] and [place] [address]. Discussed short-term nature of services. Also discussed with patient that a component of provider's role is completing the pre-surgical psychological evaluations for bone marrow transplant and CAR-T. Informed patient of psychologist's various roles in order for the patient to be fully informed when consenting to behavioral health treatment. Patient was agreeable to engaging in care with psychologist. Discussed limits of confidentiality including that psychologist coordinates with patient's treatment team and other limits of confidentiality were discussed as needed. Notified patient of supervisee status under the supervision of Tena Adam PsyD. Was provided with information on how to contact supervisor.

## 2022-06-21 ENCOUNTER — APPOINTMENT (OUTPATIENT)
Dept: GENERAL RADIOLOGY | Age: 52
DRG: 016 | End: 2022-06-21
Attending: INTERNAL MEDICINE
Payer: COMMERCIAL

## 2022-06-21 ENCOUNTER — APPOINTMENT (OUTPATIENT)
Dept: CT IMAGING | Age: 52
DRG: 016 | End: 2022-06-21
Attending: INTERNAL MEDICINE
Payer: COMMERCIAL

## 2022-06-21 LAB
ANION GAP SERPL CALCULATED.3IONS-SCNC: 10 MMOL/L (ref 3–16)
BLOOD BANK DISPENSE STATUS: NORMAL
BLOOD BANK DISPENSE STATUS: NORMAL
BLOOD BANK PRODUCT CODE: NORMAL
BLOOD BANK PRODUCT CODE: NORMAL
BPU ID: NORMAL
BPU ID: NORMAL
BUN BLDV-MCNC: 4 MG/DL (ref 7–20)
CALCIUM SERPL-MCNC: 7.6 MG/DL (ref 8.3–10.6)
CHLORIDE BLD-SCNC: 102 MMOL/L (ref 99–110)
CO2: 21 MMOL/L (ref 21–32)
CREAT SERPL-MCNC: <0.5 MG/DL (ref 0.6–1.1)
DESCRIPTION BLOOD BANK: NORMAL
DESCRIPTION BLOOD BANK: NORMAL
GFR AFRICAN AMERICAN: >60
GFR NON-AFRICAN AMERICAN: >60
GLUCOSE BLD-MCNC: 131 MG/DL (ref 70–99)
HCT VFR BLD CALC: 19.2 % (ref 36–48)
HCT VFR BLD CALC: 20.7 % (ref 36–48)
HEMOGLOBIN: 6.7 G/DL (ref 12–16)
HEMOGLOBIN: 7.1 G/DL (ref 12–16)
LACTIC ACID: 0.9 MMOL/L (ref 0.4–2)
MCH RBC QN AUTO: 34.3 PG (ref 26–34)
MCH RBC QN AUTO: 34.8 PG (ref 26–34)
MCHC RBC AUTO-ENTMCNC: 34.1 G/DL (ref 31–36)
MCHC RBC AUTO-ENTMCNC: 34.8 G/DL (ref 31–36)
MCV RBC AUTO: 100.2 FL (ref 80–100)
MCV RBC AUTO: 100.4 FL (ref 80–100)
PDW BLD-RTO: 13.6 % (ref 12.4–15.4)
PDW BLD-RTO: 13.7 % (ref 12.4–15.4)
PHOSPHORUS: 1.4 MG/DL (ref 2.5–4.9)
PLATELET # BLD: 14 K/UL (ref 135–450)
PLATELET # BLD: 6 K/UL (ref 135–450)
PMV BLD AUTO: 7.6 FL (ref 5–10.5)
PMV BLD AUTO: 8.4 FL (ref 5–10.5)
POTASSIUM SERPL-SCNC: 3.3 MMOL/L (ref 3.5–5.1)
PROCALCITONIN: 0.69 NG/ML (ref 0–0.15)
RBC # BLD: 1.92 M/UL (ref 4–5.2)
RBC # BLD: 2.06 M/UL (ref 4–5.2)
SODIUM BLD-SCNC: 133 MMOL/L (ref 136–145)
URIC ACID, SERUM: 1.3 MG/DL (ref 2.6–6)
WBC # BLD: 0 K/UL (ref 4–11)
WBC # BLD: 0 K/UL (ref 4–11)

## 2022-06-21 PROCEDURE — 74177 CT ABD & PELVIS W/CONTRAST: CPT

## 2022-06-21 PROCEDURE — 36430 TRANSFUSION BLD/BLD COMPNT: CPT

## 2022-06-21 PROCEDURE — 86850 RBC ANTIBODY SCREEN: CPT

## 2022-06-21 PROCEDURE — 86901 BLOOD TYPING SEROLOGIC RH(D): CPT

## 2022-06-21 PROCEDURE — 6360000004 HC RX CONTRAST MEDICATION: Performed by: INTERNAL MEDICINE

## 2022-06-21 PROCEDURE — 85025 COMPLETE CBC W/AUTO DIFF WBC: CPT

## 2022-06-21 PROCEDURE — 99223 1ST HOSP IP/OBS HIGH 75: CPT | Performed by: SURGERY

## 2022-06-21 PROCEDURE — 6360000002 HC RX W HCPCS: Performed by: INTERNAL MEDICINE

## 2022-06-21 PROCEDURE — 94761 N-INVAS EAR/PLS OXIMETRY MLT: CPT

## 2022-06-21 PROCEDURE — 93005 ELECTROCARDIOGRAM TRACING: CPT | Performed by: NURSE PRACTITIONER

## 2022-06-21 PROCEDURE — 84550 ASSAY OF BLOOD/URIC ACID: CPT

## 2022-06-21 PROCEDURE — 6370000000 HC RX 637 (ALT 250 FOR IP): Performed by: NURSE PRACTITIONER

## 2022-06-21 PROCEDURE — 36592 COLLECT BLOOD FROM PICC: CPT

## 2022-06-21 PROCEDURE — 86923 COMPATIBILITY TEST ELECTRIC: CPT

## 2022-06-21 PROCEDURE — 84145 PROCALCITONIN (PCT): CPT

## 2022-06-21 PROCEDURE — 2580000003 HC RX 258: Performed by: INTERNAL MEDICINE

## 2022-06-21 PROCEDURE — 74018 RADEX ABDOMEN 1 VIEW: CPT

## 2022-06-21 PROCEDURE — 2580000003 HC RX 258: Performed by: NURSE PRACTITIONER

## 2022-06-21 PROCEDURE — 86900 BLOOD TYPING SEROLOGIC ABO: CPT

## 2022-06-21 PROCEDURE — 2060000000 HC ICU INTERMEDIATE R&B

## 2022-06-21 PROCEDURE — 6360000002 HC RX W HCPCS: Performed by: NURSE PRACTITIONER

## 2022-06-21 PROCEDURE — 87086 URINE CULTURE/COLONY COUNT: CPT

## 2022-06-21 PROCEDURE — P9036 PLATELET PHERESIS IRRADIATED: HCPCS

## 2022-06-21 PROCEDURE — 0D9670Z DRAINAGE OF STOMACH WITH DRAINAGE DEVICE, VIA NATURAL OR ARTIFICIAL OPENING: ICD-10-PCS | Performed by: INTERNAL MEDICINE

## 2022-06-21 PROCEDURE — 80048 BASIC METABOLIC PNL TOTAL CA: CPT

## 2022-06-21 PROCEDURE — 2500000003 HC RX 250 WO HCPCS: Performed by: NURSE PRACTITIONER

## 2022-06-21 PROCEDURE — P9040 RBC LEUKOREDUCED IRRADIATED: HCPCS

## 2022-06-21 PROCEDURE — 83605 ASSAY OF LACTIC ACID: CPT

## 2022-06-21 PROCEDURE — 6370000000 HC RX 637 (ALT 250 FOR IP): Performed by: INTERNAL MEDICINE

## 2022-06-21 PROCEDURE — 84100 ASSAY OF PHOSPHORUS: CPT

## 2022-06-21 RX ORDER — MORPHINE SULFATE 4 MG/ML
4 INJECTION, SOLUTION INTRAMUSCULAR; INTRAVENOUS
Status: DISCONTINUED | OUTPATIENT
Start: 2022-06-21 | End: 2022-06-24

## 2022-06-21 RX ORDER — ACETAMINOPHEN 325 MG/1
650 TABLET ORAL EVERY 4 HOURS PRN
Status: DISCONTINUED | OUTPATIENT
Start: 2022-06-21 | End: 2022-06-27 | Stop reason: HOSPADM

## 2022-06-21 RX ORDER — SODIUM CHLORIDE 9 MG/ML
INJECTION, SOLUTION INTRAVENOUS PRN
Status: DISCONTINUED | OUTPATIENT
Start: 2022-06-21 | End: 2022-06-27 | Stop reason: HOSPADM

## 2022-06-21 RX ORDER — PANTOPRAZOLE SODIUM 40 MG/10ML
40 INJECTION, POWDER, LYOPHILIZED, FOR SOLUTION INTRAVENOUS DAILY
Status: DISCONTINUED | OUTPATIENT
Start: 2022-06-22 | End: 2022-06-24

## 2022-06-21 RX ORDER — MORPHINE SULFATE 2 MG/ML
2 INJECTION, SOLUTION INTRAMUSCULAR; INTRAVENOUS
Status: DISCONTINUED | OUTPATIENT
Start: 2022-06-21 | End: 2022-06-24

## 2022-06-21 RX ORDER — SODIUM CHLORIDE 9 MG/ML
INJECTION, SOLUTION INTRAVENOUS PRN
Status: DISCONTINUED | OUTPATIENT
Start: 2022-06-21 | End: 2022-06-24

## 2022-06-21 RX ADMIN — CITALOPRAM HYDROBROMIDE 20 MG: 20 TABLET ORAL at 22:51

## 2022-06-21 RX ADMIN — ACETAMINOPHEN 650 MG: 325 TABLET ORAL at 02:59

## 2022-06-21 RX ADMIN — MORPHINE SULFATE 2 MG: 2 INJECTION, SOLUTION INTRAMUSCULAR; INTRAVENOUS at 09:43

## 2022-06-21 RX ADMIN — SODIUM CHLORIDE, PRESERVATIVE FREE 10 ML: 5 INJECTION INTRAVENOUS at 09:14

## 2022-06-21 RX ADMIN — SODIUM CHLORIDE, PRESERVATIVE FREE 10 ML: 5 INJECTION INTRAVENOUS at 20:28

## 2022-06-21 RX ADMIN — ONDANSETRON 8 MG: 2 INJECTION INTRAMUSCULAR; INTRAVENOUS at 07:01

## 2022-06-21 RX ADMIN — MORPHINE SULFATE 2 MG: 2 INJECTION, SOLUTION INTRAMUSCULAR; INTRAVENOUS at 03:00

## 2022-06-21 RX ADMIN — ONDANSETRON 8 MG: 2 INJECTION INTRAMUSCULAR; INTRAVENOUS at 22:09

## 2022-06-21 RX ADMIN — FLUCONAZOLE 400 MG: 200 TABLET ORAL at 07:40

## 2022-06-21 RX ADMIN — MORPHINE SULFATE 2 MG: 2 INJECTION, SOLUTION INTRAMUSCULAR; INTRAVENOUS at 14:05

## 2022-06-21 RX ADMIN — POTASSIUM PHOSPHATE, MONOBASIC AND POTASSIUM PHOSPHATE, DIBASIC 30 MMOL: 224; 236 INJECTION, SOLUTION, CONCENTRATE INTRAVENOUS at 06:58

## 2022-06-21 RX ADMIN — PIPERACILLIN AND TAZOBACTAM 4500 MG: 4; .5 INJECTION, POWDER, LYOPHILIZED, FOR SOLUTION INTRAVENOUS at 20:23

## 2022-06-21 RX ADMIN — PIPERACILLIN AND TAZOBACTAM 4500 MG: 4; .5 INJECTION, POWDER, LYOPHILIZED, FOR SOLUTION INTRAVENOUS at 14:11

## 2022-06-21 RX ADMIN — VALACYCLOVIR HYDROCHLORIDE 500 MG: 500 TABLET, FILM COATED ORAL at 07:41

## 2022-06-21 RX ADMIN — POTASSIUM CHLORIDE 20 MEQ: 400 INJECTION, SOLUTION INTRAVENOUS at 07:05

## 2022-06-21 RX ADMIN — MORPHINE SULFATE 2 MG: 2 INJECTION, SOLUTION INTRAMUSCULAR; INTRAVENOUS at 22:51

## 2022-06-21 RX ADMIN — POTASSIUM CHLORIDE 20 MEQ: 400 INJECTION, SOLUTION INTRAVENOUS at 08:57

## 2022-06-21 RX ADMIN — FILGRASTIM-AAFI 300 MCG: 300 INJECTION, SOLUTION SUBCUTANEOUS at 17:01

## 2022-06-21 RX ADMIN — SODIUM CHLORIDE: 9 INJECTION, SOLUTION INTRAVENOUS at 02:37

## 2022-06-21 RX ADMIN — BUSPIRONE HYDROCHLORIDE 15 MG: 5 TABLET ORAL at 09:39

## 2022-06-21 RX ADMIN — LORAZEPAM 0.5 MG: 2 INJECTION INTRAMUSCULAR; INTRAVENOUS at 19:39

## 2022-06-21 RX ADMIN — ACYCLOVIR SODIUM 240 MG: 50 INJECTION, SOLUTION INTRAVENOUS at 20:22

## 2022-06-21 RX ADMIN — PANTOPRAZOLE SODIUM 40 MG: 40 TABLET, DELAYED RELEASE ORAL at 07:09

## 2022-06-21 RX ADMIN — MULTIPLE VITAMINS W/ MINERALS TAB 1 TABLET: TAB at 07:41

## 2022-06-21 RX ADMIN — ONDANSETRON 8 MG: 2 INJECTION INTRAMUSCULAR; INTRAVENOUS at 14:02

## 2022-06-21 RX ADMIN — POTASSIUM CHLORIDE 20 MEQ: 400 INJECTION, SOLUTION INTRAVENOUS at 14:13

## 2022-06-21 RX ADMIN — PIPERACILLIN AND TAZOBACTAM 4500 MG: 4; .5 INJECTION, POWDER, LYOPHILIZED, FOR SOLUTION INTRAVENOUS at 08:56

## 2022-06-21 RX ADMIN — IOPAMIDOL 80 ML: 755 INJECTION, SOLUTION INTRAVENOUS at 11:22

## 2022-06-21 RX ADMIN — CEFEPIME HYDROCHLORIDE 2000 MG: 2 INJECTION, POWDER, FOR SOLUTION INTRAVENOUS at 03:13

## 2022-06-21 RX ADMIN — IOHEXOL 50 ML: 240 INJECTION, SOLUTION INTRATHECAL; INTRAVASCULAR; INTRAVENOUS; ORAL at 11:33

## 2022-06-21 RX ADMIN — MORPHINE SULFATE 2 MG: 2 INJECTION, SOLUTION INTRAMUSCULAR; INTRAVENOUS at 17:01

## 2022-06-21 RX ADMIN — POTASSIUM CHLORIDE 20 MEQ: 400 INJECTION, SOLUTION INTRAVENOUS at 11:53

## 2022-06-21 ASSESSMENT — PAIN DESCRIPTION - LOCATION
LOCATION: ABDOMEN

## 2022-06-21 ASSESSMENT — PAIN DESCRIPTION - DESCRIPTORS
DESCRIPTORS: CRAMPING
DESCRIPTORS: CRAMPING;ACHING;DISCOMFORT
DESCRIPTORS: CRAMPING
DESCRIPTORS: ACHING;CRAMPING

## 2022-06-21 ASSESSMENT — PAIN DESCRIPTION - ORIENTATION
ORIENTATION: INNER;MID

## 2022-06-21 ASSESSMENT — PAIN DESCRIPTION - ONSET
ONSET: ON-GOING

## 2022-06-21 ASSESSMENT — PAIN DESCRIPTION - FREQUENCY
FREQUENCY: INTERMITTENT
FREQUENCY: CONTINUOUS
FREQUENCY: CONTINUOUS

## 2022-06-21 ASSESSMENT — PAIN - FUNCTIONAL ASSESSMENT
PAIN_FUNCTIONAL_ASSESSMENT: PREVENTS OR INTERFERES SOME ACTIVE ACTIVITIES AND ADLS

## 2022-06-21 ASSESSMENT — PAIN SCALES - GENERAL
PAINLEVEL_OUTOF10: 6
PAINLEVEL_OUTOF10: 5
PAINLEVEL_OUTOF10: 3
PAINLEVEL_OUTOF10: 5
PAINLEVEL_OUTOF10: 6
PAINLEVEL_OUTOF10: 7
PAINLEVEL_OUTOF10: 5

## 2022-06-21 ASSESSMENT — PAIN SCALES - WONG BAKER: WONGBAKER_NUMERICALRESPONSE: 2

## 2022-06-21 ASSESSMENT — PAIN DESCRIPTION - PAIN TYPE
TYPE: ACUTE PAIN

## 2022-06-21 NOTE — PROGRESS NOTES
[address] [employer] Progress Note    2022     Raheel Khan    MRN: [id number]    : 1970    Referring MD: No referring provider defined for this encounter. SUBJECTIVE: Feeling better since NG placed. BM last night.     ECOG PS:(2) Ambulatory and capable of self care, unable to carry out work activity, up and about > 50% or waking hours     KPS: 60% Requires occasional assistance, but is able to care for most of his personal needs    Isolation: None    Medications    Scheduled Meds:   piperacillin-tazobactam  4,500 mg IntraVENous Q6H    [START ON 2022] fluconazole  400 mg IntraVENous Q24H    [START ON 2022] pantoprazole  40 mg IntraVENous Daily    acyclovir  5 mg/kg (Ideal) IntraVENous Q12H    benzocaine   Mouth/Throat 4x Daily    ondansetron  8 mg IntraVENous 3 times per day    filgrastim-aafi  300 mcg SubCUTAneous QPM    sodium chloride flush  5-40 mL IntraVENous 2 times per day    Saline Mouthwash  15 mL Swish & Spit 4x Daily AC & HS    citalopram  20 mg Oral Daily     Continuous Infusions:   sodium chloride      sodium chloride 150 mL/hr at 22 0237    sodium chloride      sodium chloride 20 mL/hr at 06/10/22 1838    sodium chloride       PRN Meds:.acetaminophen, sodium chloride, morphine **OR** morphine, potassium phosphate IVPB, [COMPLETED] loperamide **FOLLOWED BY** loperamide, calcium carbonate, sodium chloride, diphenhydrAMINE, sodium chloride, sodium chloride flush, sodium chloride, potassium chloride, magnesium sulfate, magnesium hydroxide, Saline Mouthwash, alteplase (CATHFLO) with sterile water injection, prochlorperazine **OR** prochlorperazine, LORazepam **OR** LORazepam, busPIRone, diphenhydrAMINE, hydrOXYzine pamoate    ROS:  As noted above, otherwise remainder of 10-point ROS negative    Physical Exam:     I&O:      Intake/Output Summary (Last 24 hours) at 2022 1525  Last data filed at 2022 0949  Gross per 24 hour   Intake 2268 ml   Output 2905 ml Net -637 ml       Vital Signs:  /63   Pulse (!) 134   Temp 97.4 °F (36.3 °C) (Oral)   Resp 25   Ht 5' 1\" (1.549 m)   Wt 180 lb (81.6 kg)   SpO2 98%   BMI 34.01 kg/m²     Weight:    Wt Readings from Last 3 Encounters:   06/21/22 180 lb (81.6 kg)   06/08/22 172 lb 12.8 oz (78.4 kg)   05/27/22 170 lb (77.1 kg)     General: Awake, alert and oriented. HEENT: normocephalic, PERRL, no scleral erythema or icterus, Oral mucosa moist and intact, throat clear  NECK: supple  BACK: Straight   SKIN: warm dry and intact without lesions rashes or masses  CHEST: CTA bilaterally without use of accessory muscles  CV: Normal S1 S2, RRR, no MRG  ABD: not ditended and normal BS, no palpable masses or hepatosplenomegaly  EXTREMITIES: without edema, denies calf tenderness  NEURO: CN II - XII grossly intact  CATHETER: rt upper chest wall heath intact at the insertion site     Data    CBC:   Recent Labs     06/19/22  0430 06/20/22  0402 06/21/22  0300   WBC 0.1* 0.1* 0.0*   HGB 8.4* 7.7* 7.1*   HCT 24.5* 22.6* 20.7*   .8* 100.7* 100.4*   PLT 48* 16* 6*     BMP/Mag:  Recent Labs     06/19/22 0430 06/20/22  0402 06/21/22  0300    135* 133*   K 3.6 3.5 3.3*    102 102   CO2 26 22 21   PHOS 3.3 1.7* 1.4*   BUN 10 4* 4*   CREATININE 0.6 0.5* <0.5*   MG  --  1.50*  --      LIVP:   Recent Labs     06/20/22 0402   AST 11*   ALT 35   BILIDIR 0.7*   BILITOT 1.1*   ALKPHOS 98     Coags:   Recent Labs     06/20/22  0402   PROTIME 15.8*   INR 1.26*   APTT 32.3     Uric Acid   Recent Labs     06/19/22  0430 06/20/22  0402 06/21/22  0300   LABURIC 2.7 1.6* 1.3*       PROBLEM LIST:             1. [facility] NHL  2. COVID19 PNA  3. Anxiety / H/o Panic Attacks  4. HLD  5. CAD  6. Chronic systolic HF      TREATMENT:            1. R-CHOP x 6 cycles (2/21/21-6/4/21)  - Imbruvica added with cycle #2  2. R-ICE x 2 cycles 3/28/22-4/19/22  3. BEAM followed by Auto SCT on 6/15/22    ASSESSMENT AND PLAN:           1.  [facility] NHL: R/R, now in CR  - PET/CT 5/4/22: CR  - BM Bx/Asp 5/4/22: OMARI     Auto Day +7     2. ID:   6/21 early AM, likely GI source  - Cont Diflucan & Acyclovir ppx; cont acyclovir at discharge for VZV positive titer. - Cont Zosyn Day +2 (6/21/22)  - Blood Cxs 6/20 - NGTD (drawn prior to fever)  Sepsis: Hypotension, tachycardia, fever - improved, Afebrile and BP improved  - Recheck lactic acid & procalcitonin - stable  - Cont IVFs at 75mL/hr     3. Heme: Pancytopenia 2/2 chemotherapy  - Transfuse for Hgb < 7 and Platelets < 37F  - Plt transfusion last night prior to NGT insertion  - Cont daily GCSF (8/14/18)     4. Metabolic: HypoK  - Change IVFs: D5NS w/40KCl at 75ml/hr   - Replace K+, Mg, & Phos per PRN orders     5. Cardiac: H/o CHF, CAD, HLD  - Cleared for transplant by Dr. Bard Quach  HLD:   - Does not tolerate statins  - Consider Leqvio after transplant per Dr. Bard Quach  CAD:   - Angiogram 2019 with borderline lesions but no PCI performed  - ASA STOPPED 9/51   Chronic Systolic HF:   - Echo 8/9/49 w/EF 45-50%  - Stress Test 5/26/22: No evidence of ischemia or scar.  LVEF (73%) & LV wall motion is normal  - HOLD Toprol XL 25mg daily   - Will likely benefit from jardiance - cardiology to assess following BMT     6. Pulmonary:   - Pre-Txp PFTs: FEV1/% / DLCOcor 83%  - Encourage IS & ambulation      7. GI / Nutrition:   Nutrition:  - NPO at this time 2/2 partial SBO 6/21/22  - Follow closely with dietary, she was doing well with PO intake prior to this acute event so hopefully this will resolve quickly without need for dietary intervention  Diarrhea:  - C.diff neg 6/18  - D/c imodium with partial SBO   Nausea:   - Cont scheduled zofran (IV only)  - Compazine PRN  - Cont PPI daily   Partial SBO: terminal ileum 6/21/22  - Cont NPO except sips of water for now  - General surgery consulted3  - NGT placed 6/21 evening - 250mL output  - Monitor with serial abd exam/X-rays     8. Psych: H/o panic attacked (heart palpitations & chest discomfort) since 1988  - Cont buspar 15mg BID PRN  - Cont celexa 20mg daily     9. MSK:   - PT/OT consulted for prehabilitation / mobility program      - DVT Prophylaxis: Platelets <91,306 cells/dL - prophylactic lovenox on hold and mechanical prophylaxis with bilateral SCDs while in bed in place.   Contraindications to pharmacologic prophylaxis: Thrombocytopenia  Contraindications to mechanical prophylaxis: None      - Disposition:  Once ANC >1 and recovered from toxicities of transplant    ELEAZAR Warren - CNP   Sarwat Toro MD  [facility]  Please contact me through [address]

## 2022-06-21 NOTE — CONSULTS
Department of General Surgery - Adult  Surgical Service    Consult Note      Reason for Consult:  Partial SBO  Requesting Provider: Chandu Damon COMPLAINT:  Abdominal pain    History Obtained From:  patient, electronic medical record    HISTORY OF PRESENT ILLNESS:                The patient is a 45 y.o. female who presented to the hospital with Diffuse large B cell NHL  Who underwent consolidative treatment with BEAM chemotherapy and autologous stem cell transplant. She is now having new onset abdominal pain so she went for CT of the abd today and was found to have mild small bowel wall thickening perienteric inflammatory change. She has a transition point within the region of the distal ileum concerning for possible partial small bowel obstruction versus infectious or inflammatory enteritis. She describes her pain as sharp. It is located sina umbilical without any radiation. She denies any nausea/emesis. She states she had a colonoscopy at the age of 46.   It was normal.  She has been tolerating po intake but has noticed she is not eating as much as normal      Past Medical History:        Diagnosis Date    Hyperlipidemia     Hypertension     NSTEMI (non-ST elevated myocardial infarction) ([facility] [address]      Past Surgical History:        Procedure Laterality Date    CARPAL TUNNEL RELEASE       SECTION      IR TUNNELED CATHETER PLACEMENT GREATER THAN 5 YEARS  2022    IR TUNNELED CATHETER PLACEMENT GREATER THAN 5 YEARS 2022 [facility]    LITHOTRIPSY      TUBAL LIGATION       Current Medications:   Current Facility-Administered Medications: acetaminophen (TYLENOL) tablet 650 mg, 650 mg, Oral, Q4H PRN  0.9 % sodium chloride infusion, , IntraVENous, PRN  piperacillin-tazobactam (ZOSYN) 4,500 mg in dextrose 5 % 100 mL IVPB (mini-bag), 4,500 mg, IntraVENous, Q6H  morphine (PF) injection 2 mg, 2 mg, IntraVENous, Q2H PRN **OR** morphine injection 4 mg, 4 mg, IntraVENous, Q2H PRN  potassium phosphate 30 mmol in dextrose 5 % 250 mL IVPB, 30 mmol, IntraVENous, PRN  0.9 % sodium chloride infusion, , IntraVENous, Continuous  [COMPLETED] loperamide (IMODIUM) capsule 4 mg, 4 mg, Oral, Once **FOLLOWED BY** loperamide (IMODIUM) capsule 2 mg, 2 mg, Oral, 4x Daily PRN  ondansetron (ZOFRAN-ODT) disintegrating tablet 8 mg, 8 mg, Oral, 3 times per day **OR** ondansetron (ZOFRAN) injection 8 mg, 8 mg, IntraVENous, 3 times per day  calcium carbonate (TUMS) chewable tablet 500 mg, 500 mg, Oral, TID PRN  pantoprazole (PROTONIX) tablet 40 mg, 40 mg, Oral, QAM AC  0.9 % sodium chloride infusion, , IntraVENous, Continuous PRN  filgrastim-aafi (NIVESTYM) injection 300 mcg, 300 mcg, SubCUTAneous, QPM  diphenhydrAMINE (BENADRYL) tablet 25 mg, 25 mg, Oral, Nightly PRN  0.9 % sodium chloride infusion, , IntraVENous, Continuous PRN  sodium chloride flush 0.9 % injection 5-40 mL, 5-40 mL, IntraVENous, 2 times per day  sodium chloride flush 0.9 % injection 5-40 mL, 5-40 mL, IntraVENous, PRN  0.9 % sodium chloride infusion, 25 mL, IntraVENous, PRN  potassium chloride 20 mEq/50 mL IVPB (Central Line), 20 mEq, IntraVENous, PRN  magnesium sulfate 4000 mg in 100 mL IVPB premix, 4,000 mg, IntraVENous, PRN  magnesium hydroxide (MILK OF MAGNESIA) 400 MG/5ML suspension 10 mL, 10 mL, Oral, Daily PRN  Saline Mouthwash 15 mL, 15 mL, Swish & Spit, 4x Daily AC & HS  Saline Mouthwash 15 mL, 15 mL, Swish & Spit, Q4H PRN  alteplase (CATHFLO) 2 mg in sterile water 2 mL injection, 2 mg, IntraCATHeter, PRN  valACYclovir (VALTREX) tablet 500 mg, 500 mg, Oral, BID  fluconazole (DIFLUCAN) tablet 400 mg, 400 mg, Oral, Daily  prochlorperazine (COMPAZINE) tablet 10 mg, 10 mg, Oral, Q4H PRN **OR** prochlorperazine (COMPAZINE) injection 10 mg, 10 mg, IntraVENous, Q4H PRN  LORazepam (ATIVAN) tablet 0.5 mg, 0.5 mg, Oral, Q4H PRN **OR** LORazepam (ATIVAN) injection 0.5 mg, 0.5 mg, IntraVENous, Q4H PRN  busPIRone (BUSPAR) tablet 15 mg, 15 mg, Oral, BID PRN  citalopram (CELEXA) tablet 20 mg, 20 mg, Oral, Daily  diphenhydrAMINE (BENADRYL) tablet 25 mg, 25 mg, Oral, Q6H PRN  hydrOXYzine pamoate (VISTARIL) capsule 25 mg, 25 mg, Oral, BID PRN  therapeutic multivitamin-minerals 1 tablet, 1 tablet, Oral, Daily  Allergies:  Albuterol    Social History:   TOBACCO:  Never used tobacco  ETOH:  no  DRUGS:  Never used recreational drugs  Family History:       Problem Relation Age of Onset    Hypertension Mother     High Cholesterol Mother     Heart Surgery Mother     Dementia Father        REVIEW OF SYSTEMS:    Review of systems are considered negative except as listed below    PHYSICAL EXAM:    VITALS:  /63   Pulse (!) 134   Temp 97.4 °F (36.3 °C) (Oral)   Resp 25   Ht 5' 1\" (1.549 m)   Wt 180 lb (81.6 kg)   SpO2 98%   BMI 34.01 kg/m²   24HR INTAKE/OUTPUT:    Intake/Output Summary (Last 24 hours) at 6/21/2022 1317  Last data filed at 6/21/2022 0949  Gross per 24 hour   Intake 2268 ml   Output 3405 ml   Net -1137 ml     CONSTITUTIONAL:  Awake, alert  LUNGS: symmetrical chest rise  CARDIOVASCULAR:  Normal apical impulse, regular rate and rhythm, normal S1 and S2, no S3 or S4, and no murmur noted  ABDOMEN:  Soft, distended.  Tender to palpation sina umbilical. No guarding, no rigidity, non peritoneal  SKIN:  Pale, no bruising, no active bleeding  DATA:    CBC:   Lab Results   Component Value Date    WBC 0.0 06/21/2022    RBC 2.06 06/21/2022    HGB 7.1 06/21/2022    HCT 20.7 06/21/2022    .4 06/21/2022    MCH 34.3 06/21/2022    MCHC 34.1 06/21/2022    RDW 13.6 06/21/2022    PLT 6 06/21/2022    MPV 8.4 06/21/2022     CMP:    Lab Results   Component Value Date     06/21/2022    K 3.3 06/21/2022     06/21/2022    CO2 21 06/21/2022    BUN 4 06/21/2022    CREATININE <0.5 06/21/2022    GFRAA >60 06/21/2022    AGRATIO 1.8 06/01/2022    LABGLOM >60 06/21/2022    GLUCOSE 131 06/21/2022    PROT 4.8 06/20/2022    LABALBU 3.2 06/20/2022    CALCIUM transition point within the region of the distal ileum concerning for possible partial small bowel obstruction versus infectious or inflammatory enteritis. WBC 0.0 with platelet count of 6.     - Ct film reviewed with radiologist. pSBO present. Majority of contrast remains in the stomach  - patient without a WBC with platelet of 6. Will NOT place an NGT at this time  - recommend complete bowel rest  - will order KUB to be obtained at 6:00pm to evaluate progression of contrast  - case discussed with Dr. Margarito Hsu.  Joanna Boykin NP-C  [phone]  Resident Support Staff

## 2022-06-21 NOTE — PROGRESS NOTES
[address] [employer] Progress Note    2022     Eliot Guevara    MRN: [id number]    : 1970    Referring MD: No referring provider defined for this encounter. SUBJECTIVE: she cont to report abd discomfort/pain, mild to mod. She denied n/v. Her po intake is fair.      Overnight Events: Spiked temperature of 102 overnight, continues to have mild hypotension, and increased abdominal pain    ECOG PS:(2) Ambulatory and capable of self care, unable to carry out work activity, up and about > 50% or waking hours     KPS: 60% Requires occasional assistance, but is able to care for most of his personal needs    Isolation: None    Medications    Scheduled Meds:   piperacillin-tazobactam  4,500 mg IntraVENous Q6H    ondansetron  8 mg Oral 3 times per day    Or    ondansetron  8 mg IntraVENous 3 times per day    pantoprazole  40 mg Oral QAM AC    filgrastim-aafi  300 mcg SubCUTAneous QPM    sodium chloride flush  5-40 mL IntraVENous 2 times per day    Saline Mouthwash  15 mL Swish & Spit 4x Daily AC & HS    valACYclovir  500 mg Oral BID    fluconazole  400 mg Oral Daily    citalopram  20 mg Oral Daily    therapeutic multivitamin-minerals  1 tablet Oral Daily     Continuous Infusions:   sodium chloride      sodium chloride 150 mL/hr at 22 6957    sodium chloride      sodium chloride 20 mL/hr at 06/10/22 1838    sodium chloride       PRN Meds:.acetaminophen, sodium chloride, potassium phosphate IVPB, [COMPLETED] loperamide **FOLLOWED BY** loperamide, calcium carbonate, sodium chloride, diphenhydrAMINE, sodium chloride, sodium chloride flush, sodium chloride, potassium chloride, magnesium sulfate, magnesium hydroxide, Saline Mouthwash, alteplase (CATHFLO) with sterile water injection, prochlorperazine **OR** prochlorperazine, LORazepam **OR** LORazepam, busPIRone, diphenhydrAMINE, hydrOXYzine pamoate    ROS:  As noted above, otherwise remainder of 10-point ROS negative    Physical Exam:     I&O: Intake/Output Summary (Last 24 hours) at 6/21/2022 0807  Last data filed at 6/21/2022 0631  Gross per 24 hour   Intake 2028 ml   Output 3305 ml   Net -1277 ml       Vital Signs:  BP (!) 86/50   Pulse (!) 122   Temp 98.1 °F (36.7 °C) (Oral)   Resp 14   Ht 5' 1\" (1.549 m)   Wt 173 lb (78.5 kg)   SpO2 94%   BMI 32.69 kg/m²     Weight:    Wt Readings from Last 3 Encounters:   06/20/22 173 lb (78.5 kg)   06/08/22 172 lb 12.8 oz (78.4 kg)   05/27/22 170 lb (77.1 kg)     General: Awake, alert and oriented. HEENT: normocephalic, PERRL, no scleral erythema or icterus, Oral mucosa moist and intact, throat clear  NECK: supple  BACK: Straight   SKIN: warm dry and intact without lesions rashes or masses  CHEST: CTA bilaterally without use of accessory muscles  CV: Normal S1 S2, RRR, no MRG  ABD: mild tenderness to palpation & mild guarding, mild abd distention, normoactive BS, no palpable masses or hepatosplenomegaly  EXTREMITIES: without edema, denies calf tenderness  NEURO: CN II - XII grossly intact  CATHETER: rt upper chest wall heath intact at the insertion site     Data    CBC:   Recent Labs     06/19/22  0430 06/20/22  0402 06/21/22  0300   WBC 0.1* 0.1* 0.0*   HGB 8.4* 7.7* 7.1*   HCT 24.5* 22.6* 20.7*   .8* 100.7* 100.4*   PLT 48* 16* 6*     BMP/Mag:  Recent Labs     06/19/22  0430 06/20/22  0402 06/21/22  0300    135* 133*   K 3.6 3.5 3.3*    102 102   CO2 26 22 21   PHOS 3.3 1.7* 1.4*   BUN 10 4* 4*   CREATININE 0.6 0.5* <0.5*   MG  --  1.50*  --      LIVP:   Recent Labs     06/20/22  0402   AST 11*   ALT 35   BILIDIR 0.7*   BILITOT 1.1*   ALKPHOS 98     Coags:   Recent Labs     06/20/22  0402   PROTIME 15.8*   INR 1.26*   APTT 32.3     Uric Acid   Recent Labs     06/19/22  0430 06/20/22  0402 06/21/22  0300   LABURIC 2.7 1.6* 1.3*       PROBLEM LIST:             1. DLBC NHL  2. COVID19 PNA  3. Anxiety / H/o Panic Attacks  4. HLD  5. CAD  6.  Chronic systolic HF      TREATMENT:          1. R-CHOP x 6 cycles (2/21/21-6/4/21)  - Imbruvica added with cycle #2  2. R-ICE x 2 cycles 3/28/22-4/19/22  3. BEAM followed by Auto SCT on 6/15/22    ASSESSMENT AND PLAN:           1. DLBC NHL: R/R, now in CR  - PET/CT 5/4/22: CR  - BM Bx/Asp 5/4/22: OMARI     Auto Day +6     2. ID:   6/21 early AM, likely GI source  - Cont Diflucan & Valtrex ppx; cont acyclovir at discharge for VZV positive titer.    - Start Zosyn Day +1 (6/21/22)  - Blood Cxs 6/20 - pending (drawn prior to fever)  Sepsis: Hypotension, tachycardia, fever  - Recheck lactic acid & procalcitonin  - Cont IVFs at 150mL/hr     3. Heme: Pancytopenia 2/2 chemotherapy  - Transfuse for Hgb < 7 and Platelets < 29C  - Plt transfusion today  - Cont daily GCSF (4/02/84)     4. Metabolic: HypoNa, HypoK, Hyperglycemia, HypoPhos  - Cont IVFs: NS at 150ml/hr   - Replace K+, Mg, & Phos per PRN orders     5. Cardiac: H/o CHF, CAD, HLD  - Cleared for transplant by Dr. Audie Armenta  HLD:   - Does not tolerate statins  - Consider Leqvio after transplant per Dr. Audie Armenta  CAD:   - Angiogram 2019 with borderline lesions but no PCI performed  - ASA STOPPED 4/25   Chronic Systolic HF:   - Echo 0/1/05 w/EF 45-50%  - Stress Test 5/26/22: No evidence of ischemia or scar. LVEF (73%) & LV wall motion is normal  - HOLD Toprol XL 25mg daily   - Will likely benefit from jardiance - cardiology to assess following BMT     6. Pulmonary:   - Pre-Txp PFTs: FEV1/% / DLCOcor 83%  - Encourage IS & ambulation      7. GI / Nutrition:  Appetite and oral intake is good  - Cont low microbial diet   - Follow closely with dietary  - Cont MVI  Diarrhea:  - C.diff neg 6/18  - Imodium PRN  Nausea:   - Cont scheduled zofran  - Compazine PRN  - Cont PPI daily      8. Psych: H/o panic attacked (heart palpitations & chest discomfort) since 1988  - Cont buspar 15mg BID PRN  - Cont celexa 20mg daily     9.  MSK:   - PT/OT consulted for prehabilitation / mobility program    - DVT Prophylaxis: Platelets <67,735 cells/dL - prophylactic lovenox on hold and mechanical prophylaxis with bilateral SCDs while in bed in place.   Contraindications to pharmacologic prophylaxis: Thrombocytopenia  Contraindications to mechanical prophylaxis: None      - Disposition:  Once ANC >1 and recovered from toxicities of transplant    Meredith Dakins, APRN - CNP     Eric Camacho MD

## 2022-06-21 NOTE — PROGRESS NOTES
Physical Therapy/Occupational Therapy  HOLD    Came to see pt for PT/OT. Pt presently off floor in CT scan. OT also attempted earlier in AM, but pt with low BP, high HR. Will return later as schedule allows.     [address], OTR/L, 2411

## 2022-06-21 NOTE — PLAN OF CARE
Problem: Safety - Adult  Goal: Free from fall injury  6/21/2022 0452 by Otis Durham RN  Outcome: Progressing  Note: Pt is a High fall risk. See Charity Jump Fall Score and ABCDS Injury Risk assessments.    + High Fall Risk per ROSALES/ABCDS: Explained fall risk precautions to pt and family and rationale behind their use to keep the patient safe. Pt bed is in low position, side rails up, call light and belongings are in reach. Fall wristband applied and present on pts wrist.  Bed alarm on. Pt encouraged to call for assistance. Will continue with hourly rounds for PO intake, pain needs, toileting and repositioning as needed.         Problem: Pain  Goal: Verbalizes/displays adequate comfort level or baseline comfort level  6/21/2022 0452 by Otis Durham RN  Outcome: Progressing  Flowsheets (Taken 6/21/2022 7679)  Verbalizes/displays adequate comfort level or baseline comfort level:   Encourage patient to monitor pain and request assistance   Assess pain using appropriate pain scale   Administer analgesics based on type and severity of pain and evaluate response   Implement non-pharmacological measures as appropriate and evaluate response

## 2022-06-21 NOTE — PROGRESS NOTES
Physical Therapy  Refusal    Pt is a pt being followed for mobility program.  Nursing notes indicate pt may be showing increased need for physical assistance. Came to see pt for resumption of PT treatment and pt presently declining, stating she just returned to bed and is awaiting \"a tube to go down my nose\". Will return 6/22/22.   [facility] 9847

## 2022-06-22 LAB
ABO/RH: NORMAL
ALBUMIN SERPL-MCNC: 2.7 G/DL (ref 3.4–5)
ALP BLD-CCNC: 89 U/L (ref 40–129)
ALT SERPL-CCNC: 19 U/L (ref 10–40)
ANION GAP SERPL CALCULATED.3IONS-SCNC: 9 MMOL/L (ref 3–16)
ANTIBODY SCREEN: NORMAL
AST SERPL-CCNC: 9 U/L (ref 15–37)
BILIRUB SERPL-MCNC: 1.4 MG/DL (ref 0–1)
BILIRUBIN DIRECT: 0.9 MG/DL (ref 0–0.3)
BILIRUBIN, INDIRECT: 0.5 MG/DL (ref 0–1)
BLOOD BANK DISPENSE STATUS: NORMAL
BLOOD BANK PRODUCT CODE: NORMAL
BPU ID: NORMAL
BUN BLDV-MCNC: 5 MG/DL (ref 7–20)
CALCIUM SERPL-MCNC: 7.9 MG/DL (ref 8.3–10.6)
CHLORIDE BLD-SCNC: 107 MMOL/L (ref 99–110)
CO2: 21 MMOL/L (ref 21–32)
CREAT SERPL-MCNC: <0.5 MG/DL (ref 0.6–1.1)
DESCRIPTION BLOOD BANK: NORMAL
EKG ATRIAL RATE: 124 BPM
EKG DIAGNOSIS: NORMAL
EKG P AXIS: 61 DEGREES
EKG P-R INTERVAL: 130 MS
EKG Q-T INTERVAL: 298 MS
EKG QRS DURATION: 82 MS
EKG QTC CALCULATION (BAZETT): 428 MS
EKG R AXIS: 15 DEGREES
EKG T AXIS: 38 DEGREES
EKG VENTRICULAR RATE: 124 BPM
GFR AFRICAN AMERICAN: >60
GFR NON-AFRICAN AMERICAN: >60
GLUCOSE BLD-MCNC: 110 MG/DL (ref 70–99)
HCT VFR BLD CALC: 21.2 % (ref 36–48)
HEMOGLOBIN: 7.4 G/DL (ref 12–16)
LACTATE DEHYDROGENASE: 139 U/L (ref 100–190)
MCH RBC QN AUTO: 33.5 PG (ref 26–34)
MCHC RBC AUTO-ENTMCNC: 35.1 G/DL (ref 31–36)
MCV RBC AUTO: 95.6 FL (ref 80–100)
PDW BLD-RTO: 17.4 % (ref 12.4–15.4)
PHOSPHORUS: 2 MG/DL (ref 2.5–4.9)
PLATELET # BLD: 60 K/UL (ref 135–450)
PMV BLD AUTO: 7.5 FL (ref 5–10.5)
POTASSIUM SERPL-SCNC: 3 MMOL/L (ref 3.5–5.1)
RBC # BLD: 2.22 M/UL (ref 4–5.2)
SODIUM BLD-SCNC: 137 MMOL/L (ref 136–145)
THROAT CULTURE: NORMAL
TOTAL PROTEIN: 4.7 G/DL (ref 6.4–8.2)
URIC ACID, SERUM: 0.8 MG/DL (ref 2.6–6)
WBC # BLD: 0.1 K/UL (ref 4–11)

## 2022-06-22 PROCEDURE — C9113 INJ PANTOPRAZOLE SODIUM, VIA: HCPCS | Performed by: NURSE PRACTITIONER

## 2022-06-22 PROCEDURE — 99232 SBSQ HOSP IP/OBS MODERATE 35: CPT | Performed by: SURGERY

## 2022-06-22 PROCEDURE — 2500000003 HC RX 250 WO HCPCS: Performed by: NURSE PRACTITIONER

## 2022-06-22 PROCEDURE — 97116 GAIT TRAINING THERAPY: CPT

## 2022-06-22 PROCEDURE — 2580000003 HC RX 258: Performed by: NURSE PRACTITIONER

## 2022-06-22 PROCEDURE — 93010 ELECTROCARDIOGRAM REPORT: CPT | Performed by: INTERNAL MEDICINE

## 2022-06-22 PROCEDURE — 80048 BASIC METABOLIC PNL TOTAL CA: CPT

## 2022-06-22 PROCEDURE — 85025 COMPLETE CBC W/AUTO DIFF WBC: CPT

## 2022-06-22 PROCEDURE — 84550 ASSAY OF BLOOD/URIC ACID: CPT

## 2022-06-22 PROCEDURE — 36592 COLLECT BLOOD FROM PICC: CPT

## 2022-06-22 PROCEDURE — 6370000000 HC RX 637 (ALT 250 FOR IP): Performed by: STUDENT IN AN ORGANIZED HEALTH CARE EDUCATION/TRAINING PROGRAM

## 2022-06-22 PROCEDURE — 6360000002 HC RX W HCPCS: Performed by: INTERNAL MEDICINE

## 2022-06-22 PROCEDURE — 84100 ASSAY OF PHOSPHORUS: CPT

## 2022-06-22 PROCEDURE — 83615 LACTATE (LD) (LDH) ENZYME: CPT

## 2022-06-22 PROCEDURE — 6360000002 HC RX W HCPCS: Performed by: NURSE PRACTITIONER

## 2022-06-22 PROCEDURE — 2060000000 HC ICU INTERMEDIATE R&B

## 2022-06-22 PROCEDURE — 6370000000 HC RX 637 (ALT 250 FOR IP): Performed by: NURSE PRACTITIONER

## 2022-06-22 PROCEDURE — 80076 HEPATIC FUNCTION PANEL: CPT

## 2022-06-22 PROCEDURE — 97110 THERAPEUTIC EXERCISES: CPT

## 2022-06-22 PROCEDURE — 36430 TRANSFUSION BLD/BLD COMPNT: CPT

## 2022-06-22 RX ORDER — FLUCONAZOLE 2 MG/ML
400 INJECTION, SOLUTION INTRAVENOUS EVERY 24 HOURS
Status: DISCONTINUED | OUTPATIENT
Start: 2022-06-22 | End: 2022-06-24

## 2022-06-22 RX ADMIN — POTASSIUM CHLORIDE 20 MEQ: 400 INJECTION, SOLUTION INTRAVENOUS at 06:48

## 2022-06-22 RX ADMIN — POTASSIUM PHOSPHATE, MONOBASIC AND POTASSIUM PHOSPHATE, DIBASIC 30 MMOL: 224; 236 INJECTION, SOLUTION, CONCENTRATE INTRAVENOUS at 06:50

## 2022-06-22 RX ADMIN — CITALOPRAM HYDROBROMIDE 20 MG: 20 TABLET ORAL at 20:56

## 2022-06-22 RX ADMIN — POTASSIUM CHLORIDE 20 MEQ: 400 INJECTION, SOLUTION INTRAVENOUS at 12:48

## 2022-06-22 RX ADMIN — SODIUM CHLORIDE 15 ML: 900 IRRIGANT IRRIGATION at 15:14

## 2022-06-22 RX ADMIN — MORPHINE SULFATE 2 MG: 2 INJECTION, SOLUTION INTRAMUSCULAR; INTRAVENOUS at 01:22

## 2022-06-22 RX ADMIN — SODIUM CHLORIDE, PRESERVATIVE FREE 10 ML: 5 INJECTION INTRAVENOUS at 21:07

## 2022-06-22 RX ADMIN — PANTOPRAZOLE SODIUM 40 MG: 40 INJECTION, POWDER, FOR SOLUTION INTRAVENOUS at 09:26

## 2022-06-22 RX ADMIN — BUSPIRONE HYDROCHLORIDE 15 MG: 5 TABLET ORAL at 20:56

## 2022-06-22 RX ADMIN — PIPERACILLIN AND TAZOBACTAM 4500 MG: 4; .5 INJECTION, POWDER, LYOPHILIZED, FOR SOLUTION INTRAVENOUS at 21:01

## 2022-06-22 RX ADMIN — PIPERACILLIN AND TAZOBACTAM 4500 MG: 4; .5 INJECTION, POWDER, LYOPHILIZED, FOR SOLUTION INTRAVENOUS at 03:02

## 2022-06-22 RX ADMIN — FILGRASTIM-AAFI 300 MCG: 300 INJECTION, SOLUTION SUBCUTANEOUS at 18:17

## 2022-06-22 RX ADMIN — ONDANSETRON 8 MG: 2 INJECTION INTRAMUSCULAR; INTRAVENOUS at 15:14

## 2022-06-22 RX ADMIN — ONDANSETRON 8 MG: 2 INJECTION INTRAMUSCULAR; INTRAVENOUS at 21:47

## 2022-06-22 RX ADMIN — ACYCLOVIR SODIUM 240 MG: 50 INJECTION, SOLUTION INTRAVENOUS at 21:01

## 2022-06-22 RX ADMIN — MORPHINE SULFATE 2 MG: 2 INJECTION, SOLUTION INTRAMUSCULAR; INTRAVENOUS at 20:56

## 2022-06-22 RX ADMIN — PIPERACILLIN AND TAZOBACTAM 4500 MG: 4; .5 INJECTION, POWDER, LYOPHILIZED, FOR SOLUTION INTRAVENOUS at 09:34

## 2022-06-22 RX ADMIN — POTASSIUM CHLORIDE 20 MEQ: 400 INJECTION, SOLUTION INTRAVENOUS at 11:39

## 2022-06-22 RX ADMIN — ONDANSETRON 8 MG: 2 INJECTION INTRAMUSCULAR; INTRAVENOUS at 05:30

## 2022-06-22 RX ADMIN — POTASSIUM CHLORIDE 20 MEQ: 400 INJECTION, SOLUTION INTRAVENOUS at 05:30

## 2022-06-22 RX ADMIN — SODIUM CHLORIDE 15 ML: 900 IRRIGANT IRRIGATION at 21:01

## 2022-06-22 RX ADMIN — ACYCLOVIR SODIUM 240 MG: 50 INJECTION, SOLUTION INTRAVENOUS at 09:36

## 2022-06-22 RX ADMIN — PIPERACILLIN AND TAZOBACTAM 4500 MG: 4; .5 INJECTION, POWDER, LYOPHILIZED, FOR SOLUTION INTRAVENOUS at 15:20

## 2022-06-22 RX ADMIN — FLUCONAZOLE 400 MG: 2 INJECTION, SOLUTION INTRAVENOUS at 10:44

## 2022-06-22 RX ADMIN — POTASSIUM CHLORIDE: 2 INJECTION, SOLUTION, CONCENTRATE INTRAVENOUS at 09:29

## 2022-06-22 RX ADMIN — MORPHINE SULFATE 2 MG: 2 INJECTION, SOLUTION INTRAMUSCULAR; INTRAVENOUS at 03:18

## 2022-06-22 RX ADMIN — BENZOCAINE: 200 SPRAY DENTAL; ORAL; PERIODONTAL at 01:32

## 2022-06-22 ASSESSMENT — PAIN DESCRIPTION - ORIENTATION
ORIENTATION: INNER
ORIENTATION_2: MID
ORIENTATION: INNER;MID
ORIENTATION: INNER

## 2022-06-22 ASSESSMENT — PAIN DESCRIPTION - INTENSITY
RATING_2: 4
RATING_2: 0

## 2022-06-22 ASSESSMENT — PAIN DESCRIPTION - FREQUENCY
FREQUENCY: CONTINUOUS
FREQUENCY: INTERMITTENT
FREQUENCY: CONTINUOUS

## 2022-06-22 ASSESSMENT — PAIN SCALES - GENERAL
PAINLEVEL_OUTOF10: 4
PAINLEVEL_OUTOF10: 3
PAINLEVEL_OUTOF10: 4
PAINLEVEL_OUTOF10: 4

## 2022-06-22 ASSESSMENT — PAIN DESCRIPTION - DESCRIPTORS
DESCRIPTORS: ACHING
DESCRIPTORS_2: ACHING
DESCRIPTORS: ACHING
DESCRIPTORS: ACHING

## 2022-06-22 ASSESSMENT — PAIN - FUNCTIONAL ASSESSMENT
PAIN_FUNCTIONAL_ASSESSMENT: PREVENTS OR INTERFERES SOME ACTIVE ACTIVITIES AND ADLS
PAIN_FUNCTIONAL_ASSESSMENT: ACTIVITIES ARE NOT PREVENTED
PAIN_FUNCTIONAL_ASSESSMENT: ACTIVITIES ARE NOT PREVENTED
PAIN_FUNCTIONAL_ASSESSMENT_SITE2: ACTIVITIES ARE NOT PREVENTED

## 2022-06-22 ASSESSMENT — PAIN DESCRIPTION - ONSET
ONSET: ON-GOING

## 2022-06-22 ASSESSMENT — PAIN DESCRIPTION - LOCATION
LOCATION: ABDOMEN
LOCATION: ABDOMEN
LOCATION_2: THROAT
LOCATION: ABDOMEN

## 2022-06-22 ASSESSMENT — PAIN DESCRIPTION - PAIN TYPE
TYPE: ACUTE PAIN

## 2022-06-22 ASSESSMENT — PAIN SCALES - WONG BAKER
WONGBAKER_NUMERICALRESPONSE: 0

## 2022-06-22 NOTE — PROGRESS NOTES
Surgery Daily Progress Note      CC: partial SBO    SUBJECTIVE:  NG placed overnight with significant relief of discomfort. ROS:   A 14 point review of systems was conducted, significant findings as noted above. All other systems negative. OBJECTIVE:    PHYSICAL EXAM:  Vitals:    06/22/22 0141 06/22/22 0152 06/22/22 0315 06/22/22 0348   BP: 91/60  124/76    Pulse: (!) 121  (!) 118    Resp: 20 22 20 19   Temp: 97.9 °F (36.6 °C)  97.8 °F (36.6 °C)    TempSrc: Oral  Oral    SpO2: 94%  94%    Weight:       Height:           General appearance: alert, resting in bed, in NAD  Neuro: A&Ox3, no focal deficits, sensation intact  HEENT: NG tube to LWS, with thick gastric output  Chest/Lungs: normal effort, no accessory muscle use, on RA  Cardiovascular: tachycardic regular rhythm  Abdomen: soft, minimally distended improved from prior , non-tender, no guarding or rigidity    Extremities: no edema, no cyanosis      ASSESSMENT & PLAN:   This is a 45 y.o. female with a diagnosis of Diffuse Large B Cell Lymphoma s/p BEAM chemotherapy and autologous stem cell transplant with evidence of partial small bowel obstruction    - clamp trial this AM, if residual less than 200 in 4 hr, will take NG out and start clear liquid diet  - Continue transfusion per primary.  Received pRBC and plt last night  - Replace potassium   - PT/OT  - Further management per primary team      Kenna Bhat DO  06/22/22  6:31 AM

## 2022-06-22 NOTE — DISCHARGE INSTR - COC
Continuity of Care Form    Patient Name: Elier Garcia   :  1970  MRN:  [id number]    Admit date:  2022  Discharge date:  ***    Code Status Order: Full Code   Advance Directives:      Admitting Physician:  Eugenio Leary MD  PCP: Yecenia Rivero MD    Discharging Nurse: [facility] Unit/Room#: 8695/2703-65  Discharging Unit Phone Number: ***    Emergency Contact:   Extended Emergency Contact Information  Primary Emergency Contact: [facility] Phone: [phone]  Mobile Phone: [phone]  Relation: Spouse    Past Surgical History:  Past Surgical History:   Procedure Laterality Date    CARPAL TUNNEL RELEASE       SECTION      IR TUNNELED CATHETER PLACEMENT GREATER THAN 5 YEARS  2022    IR TUNNELED CATHETER PLACEMENT GREATER THAN 5 YEARS 2022 [facility]    LITHOTRIPSY      TUBAL LIGATION         Immunization History: There is no immunization history on file for this patient.     Active Problems:  Patient Active Problem List   Diagnosis Code    Nonocclusive coronary atherosclerosis of native coronary artery I25.10    Chronic systolic congestive heart failure (HCC) I50.22    Mixed hyperlipidemia E78.2    Autologous donor of stem cells Z52.011    Diffuse large B cell lymphoma (HCC) C83.30    GVHD (graft versus host disease) ([facility] [address] D89.813    Ileus ([facility] [address] K56.7       Isolation/Infection:   Isolation            No Isolation          Patient Infection Status       Infection Onset Added Last Indicated Last Indicated By Review Planned Expiration Resolved Resolved By    None active    Resolved    C-diff Rule Out 22 Clostridium difficile toxin/antigen (Ordered)   22 Rule-Out Test Resulted            Nurse Assessment:  Last Vital Signs: BP (!) 104/58   Pulse (!) 128   Temp 97.4 °F (36.3 °C) (Oral)   Resp 22   Ht 5' 1\" (1.549 m)   Wt 184 lb 9.6 oz (83.7 kg)   SpO2 95%   BMI 34.88 kg/m²     Last documented pain score (0-10 scale): Pain Level: 3  Last Weight:   Wt Readings from Last 1 Encounters:   22 184 lb 9.6 oz (83.7 kg)     Mental Status:  {IP PT MENTAL STATUS:39313}    IV Access:  { ISABELA IV ACCESS:436914856}    Nursing Mobility/ADLs:  Walking   {CHP DME NQDY:324258520}  Transfer  {CHP DME PVPO:434329013}  Bathing  {CHP DME JDDL:600166191}  Dressing  {CHP DME FVAD:615086489}  Toileting  {CHP DME GGBM:433995200}  Feeding  {CHP DME BXPR:844233960}  Med Admin  {CHP DME CNEO:736417498}  Med Delivery   { ISABELA MED Delivery:294335412}    Wound Care Documentation and Therapy:        Elimination:  Continence: Bowel: {YES / GH:81426}  Bladder: {YES / VR:99842}  Urinary Catheter: {Urinary Catheter:837361704}   Colostomy/Ileostomy/Ileal Conduit: {YES / CQ:26486}       Date of Last BM: ***    Intake/Output Summary (Last 24 hours) at 2022 1204  Last data filed at 2022 0801  Gross per 24 hour   Intake 1725 ml   Output 1350 ml   Net 375 ml     I/O last 3 completed shifts: In: 2300 [P.O.:600; I.V.:1700]  Out: 6941 [Urine:2605; Emesis/NG output:250; Other:1000]    Safety Concerns:     [address] Safety Concerns:041954844}    Impairments/Disabilities:      [address] Impairments/Disabilities:262423915}    Nutrition Therapy:  Current Nutrition Therapy:   [address] Diet List:701020348}    Routes of Feeding: {P DME Other Feedings:427658811}  Liquids: {Slp liquid thickness:61606}  Daily Fluid Restriction: {CHP DME Yes amt example:007113997}  Last Modified Barium Swallow with Video (Video Swallowing Test): {Done Not Done JZEL:083149286}    Treatments at the Time of Hospital Discharge:   Respiratory Treatments: ***  Oxygen Therapy:  {Therapy; copd oxygen:67082}  Ventilator:    { CC Vent RMB}    Heart Failure Instructions for Daily Management  Patient was treated for chronic diastolic heart failure. she  will require the following:    Please weigh daily on the same scale and approximately the same time of day.   Report weight gain of 3 pounds/day or 5 pounds/week to : [address] [phone]. Please use hospital discharge weight as baseline reference. Please monitor for signs and symptoms of and report to MD:  Worsening Heart Failure: sudden weight gain, shortness of breath, lower extremity or general edema/swelling, abdominal bloating/swelling, inability to lie flat, intolerance to usual activity, or cough (especially at night). Report these finding even if no increase in weight. Dehydration:  having difficulty or a decrease in urination, dizziness, worsening fatigue, or new onset/worsening of generalized weakness. Please continue a LOW SODIUM diet and LIMIT fluid intake to 48 - 64 ounces ( 1.5 - 2 liters) per day. Call [address] [phone] with any questions or concerns. Please continue heart failure education to patient and family/support system. See After Visit Summary for hospital follow up appointment details. Consider spiritual care referral for support and/or completion of advance directives . Consider: [place] [address] telehealth program if patient agreeable and able to participate.   Patient's primary cardiologist is Dr Michela Dalal        Rehab Therapies: {THERAPEUTIC INTERVENTION:3597467741}  Weight Bearing Status/Restrictions: [facility] Weight Bearin}  Other Medical Equipment (for information only, NOT a DME order):  {EQUIPMENT:376061164}  Other Treatments: ***    Patient's personal belongings (please select all that are sent with patient):  {CHP DME Belongings:193619049}    RN SIGNATURE:  {Esignature:136171993}    CASE MANAGEMENT/SOCIAL WORK SECTION    Inpatient Status Date: ***    Readmission Risk Assessment Score:  Readmission Risk              Risk of Unplanned Readmission:  27           Discharging to Facility/ Agency   Name:   Address:  Phone:  Fax:    Dialysis Facility (if applicable)   Name:  Address:  Dialysis Schedule:  Phone:  Fax:    / signature: {Esignature:479706809}    PHYSICIAN SECTION    Prognosis: {Prognosis:7781607334}    Condition at Discharge: Jerad Pedraza Patient Condition:179746888}    Rehab Potential (if transferring to Rehab): {Prognosis:9909789032}    Recommended Labs or Other Treatments After Discharge: ***    Physician Certification: I certify the above information and transfer of Pauline Celaya  is necessary for the continuing treatment of the diagnosis listed and that she requires {Admit to Appropriate Level of Care:69229} for {GREATER/LESS:714693561} 30 days.      Update Admission H&P: {CHP DME Changes in XLYVY:863084919}    PHYSICIAN SIGNATURE:  {Esignature:514058093}

## 2022-06-22 NOTE — PROGRESS NOTES
Physical Therapy  Facility/Department: [address]  Physical Therapy Treatment    Name: Casandra Pascual  : 1970  MRN: [id number]  Date of Service: 2022    Discharge Recommendations: Casandra Pascual scored a 20/24 on the AM-PAC short mobility form. Current research shows that an AM-PAC score of 18 or greater is typically associated with a discharge to the patient's home setting. If patient discharges prior to next session this note will serve as a discharge summary. Please see below for the latest assessment towards goals. PT Equipment Recommendations  Equipment Needed: No      Patient Diagnosis(es): There were no encounter diagnoses. Past Medical History:  has a past medical history of Hyperlipidemia, Hypertension, and NSTEMI (non-ST elevated myocardial infarction) ([facility] [address] Past Surgical History:  has a past surgical history that includes Carpal tunnel release;  section; Tubal ligation; Lithotripsy; and IR TUNNELED CVC PLACE WO SQ PORT/PUMP > 5 YEARS (2022). Assessment   Assessment: Pt with a slight decline in functional mobility & NG tubed just removed this AM.  Pt currently on bed/chair alarms. Will increase frequency of therapy for now to 2-5x/week to get pt back to her baseline status. Currently demo elev HR with mobility & some SOB but gait was steady. Will follow. Treatment Diagnosis: deconditioning  Therapy Prognosis: Good  Requires PT Follow-Up: Yes  Activity Tolerance  Activity Tolerance: Patient limited by endurance  Activity Tolerance Comments: HR increased from 120 to 140 with ambulation- RN aware. Min-mod SOB with activity.      Plan   Plan  Plan:  (2-5)  Current Treatment Recommendations: Balance training,Strengthening,Functional mobility training,Transfer training,Gait training  Safety Devices  Type of Devices: Call light within reach,Left in bed,Chair alarm in place,Nurse notified     Restrictions  Position Activity Restriction  Other position/activity restrictions: up as tolerated     Subjective   General  Chart Reviewed: Yes  Additional Pertinent Hx: PMH: CAD, CHF, NHL. pt admitted for auto SCT. Family / Caregiver Present: Yes (arrived at end of session)  Referring Practitioner: Deedee Jackson  Diagnosis: NHL  Follows Commands: Within Functional Limits  Subjective  Subjective: Found in bed, eager to get up & walk. Just had NG tube removed. Currently on bed alarm. States she hasn't walked in serna in over a week. RN reports HR has been high & wants portable monitor on while walking. Cognition   Orientation  Overall Orientation Status: Within Normal Limits     Objective                            Bed mobility  Supine to Sit: Independent  Transfers  Sit to Stand: Supervision  Stand to sit: Supervision  Ambulation  Surface: level tile  Device:  (holding onto IV pole)  Assistance: Stand by assistance  Quality of Gait: no loss of balance; min-mod SOB noted; took 1 standing rest break  Distance: 200'        A/AROM Exercises: Performed 15 reps seated marches indep; 10 reps LAQ indep. AM-PAC Score  AM-PAC Inpatient Mobility Raw Score : 20 (06/22/22 [facility])  AM-PAC Inpatient T-Scale Score : 47.67 (06/22/22 1157)  Mobility Inpatient CMS 0-100% Score: 35.83 (06/22/22 115)  Mobility Inpatient CMS G-Code Modifier : CJ (06/22/22 1157)          Goals  Short Term Goals  Time Frame for Short term goals: D/C  Short term goal 1: Pt will report and demonstrate independence with daily HEP- Discontinue 6/22. Revised goal #1:  Demo sitting or standing HEP 10-15 reps indep. Short term goal 2: Pt will maintain functional independence with transfers and gait throughout hospitalization- Discontinue 6/22. Revised goal #2:  Ambulate 400' independent without holding IV pole & without LOB.   Short term goal 3: Sit<>stand independent  Patient Goals   Patient goals : to go home       Education  Patient Education  Education Given To: Patient  Education Provided: Role of Therapy;Home Exercise Program  Education Provided Comments: performing seated exercises now since on bed/chair alarms & not walking on her own  Education Outcome: Verbalized understanding;Demonstrated understanding      Therapy Time   Individual Concurrent Group Co-treatment   Time In 1116         Time Out 1140         Minutes 24           Timed Code Treatment Minutes:   24    Total Treatment Minutes:  [facility], [facility]

## 2022-06-22 NOTE — PROGRESS NOTES
General Surgery update    Patients NGT was clamped this morning   Residual checked at 1100. Minimal green gastric contents ~ 25cc. NGT removed without difficulty.  Patient tolerated well   CLD started     ELEAZAR Forman CNP 6/22/2022 11:23 AM   Available via [employer] [phone]

## 2022-06-22 NOTE — CARE COORDINATION
Case Management Assessment           Daily Note                 Date/ Time of Note: 6/22/2022 11:17 AM         Note completed by: HORTENSIA Frey    Patient Name: Raheel Khan  YOB: 1970    Diagnosis:Diffuse large b-cell lymphoma, lymph nodes of head, face, and neck [C83.31]  Diffuse large B cell lymphoma ([address] [C83.30]  Patient Admission Status: Inpatient    Date of Admission:6/9/2022  9:19 AM Length of Stay: 13 GLOS: GMLOS: 17.1    Current Plan of Care: staying at [facility] with [address]  ________________________________________________________________________________________  PT AM-PAC: 24 / 24 per last evaluation on: 6/21/2022    OT AM-PAC: 25 / 24 per last evaluation on: 6/20/2022    DME Needs for discharge: none  ________________________________________________________________________________________  Discharge Plan: Other: staying at [facility]    Tentative discharge date: tbd    Current barriers to discharge: recovery from auto transplant    Referrals completed: Not Applicable    Resources/ information provided: Not indicated at this time  ________________________________________________________________________________________  Case Management Notes: Patient now with NG tube in place. Partial small bowel obstruction. Day +7 from auto transplant. Following for possible needs at discharge. Kallie Herr and her family were provided with choice of provider; she and her family are in agreement with the discharge plan.     Care Transition Patient: Bertha Barnard  Case Management Department  Ph: [phone]  Fax: [phone]

## 2022-06-22 NOTE — PLAN OF CARE
Problem: Safety - Adult  Goal: Free from fall injury  6/21/2022 0452 by Romero Costello RN  Outcome: Progressing  Note: Pt is a High fall risk. See Germahnazean Diver Fall Score and ABCDS Injury Risk assessments.    + High Fall Risk per ROSALES/ABCDS: Explained fall risk precautions to pt and family and rationale behind their use to keep the patient safe. Pt bed is in low position, side rails up, call light and belongings are in reach. Fall wristband applied and present on pts wrist.  Bed alarm on. Pt encouraged to call for assistance. Will continue with hourly rounds for PO intake, pain needs, toileting and repositioning as needed.         Problem: Pain  Goal: Verbalizes/displays adequate comfort level or baseline comfort level  6/21/2022 0452 by Romero Costello RN  Outcome: Progressing  Flowsheets (Taken 6/21/2022 7002)  Verbalizes/displays adequate comfort level or baseline comfort level:   Encourage patient to monitor pain and request assistance   Assess pain using appropriate pain scale   Administer analgesics based on type and severity of pain and evaluate response   Implement non-pharmacological measures as appropriate and evaluate response

## 2022-06-22 NOTE — PLAN OF CARE
Problem: Safety - Adult  Goal: Free from fall injury  Outcome: Progressing  Pt in bed with bed alarm on, instructed to call for assistance. Verbalized understanding. All fall precautions in place. Problem: ABCDS Injury Assessment  Goal: Absence of physical injury  Outcome:  Pt is a Med fall risk. See Flaco Irene Fall Score and ABCDS Injury Risk assessments. - Screening for Orthostasis AND not a [place] Risk per ROSALES/ABCDS: Pt bed is in low position, side rails up, call light and belongings are in reach. Fall risk light is on outside pts room. Pt encouraged to call for assistance as needed. Will continue with hourly rounds for PO intake, pain needs, toileting and repositioning as needed. Orthostatic vital signs obtained at start of shift - see flowsheet for details. Pt meets criteria for orthostasis. Pt is a Med fall risk. See Flaco Irene Fall Score and ABCDS Injury Risk assessments. Patient is on a bed alarm due to being in sinus tachycardia. Will continue to monitor. Problem: Pain  Goal: Verbalizes/displays adequate comfort level or baseline comfort level  Outcome: Progressing    Patient had pain 4/10 earlier in shift, pain relieved once NG removed and pt had a BM. Will continue to monitor. Problem: Infection - Adult  Goal: Absence of infection during hospitalization  Outcome: Progressing  Pt afebrile. standard / neutropenic precautions in place; site and dressing remain c/d/i. Lines flush well with good blood return; Tegaderm and Biopatch in place. Lines pinned per protocol. Will continue to monitor. CVC site remains free of signs/symptoms of infection. No drainage, edema, erythema, pain, or warmth noted at site. Dressing changes continue per protocol and on an as needed basis - see flowsheet. Compliant with BCC Bath Protocol:  Performed CHG bath today per BCC protocol utilizing CHG solution in the shower.   CVC site cleansed with CHG wipe over dressing, skin surrounding dressing, and first 6\" of IV tubing. Pt tolerated well. Continued to encourage daily CHG bathing per [facility] protocol. Problem: Hematologic - Adult  Goal: Maintains hematologic stability  Outcome: Progressing  Patient's hemoglobin this AM:   Recent Labs     06/22/22  0415   HGB 7.4*     Patient's platelet count this AM:   Recent Labs     06/22/22  0415   PLT 60*    Thrombocytopenia not present at this time. Patient showing no signs or symptoms of active bleeding. Transfusion not indicated at this time. Patient verbalizes understanding of all instructions. Will continue to assess and implement POC. Call light within reach and hourly rounding in place.   Patient's hemoglobin this AM:

## 2022-06-23 LAB
ANION GAP SERPL CALCULATED.3IONS-SCNC: 11 MMOL/L (ref 3–16)
APTT: 38.2 SEC (ref 23–34.3)
BUN BLDV-MCNC: 3 MG/DL (ref 7–20)
CALCIUM SERPL-MCNC: 7.8 MG/DL (ref 8.3–10.6)
CHLORIDE BLD-SCNC: 109 MMOL/L (ref 99–110)
CO2: 19 MMOL/L (ref 21–32)
CREAT SERPL-MCNC: <0.5 MG/DL (ref 0.6–1.1)
GFR AFRICAN AMERICAN: >60
GFR NON-AFRICAN AMERICAN: >60
GLUCOSE BLD-MCNC: 134 MG/DL (ref 70–99)
HCT VFR BLD CALC: 21.7 % (ref 36–48)
HEMOGLOBIN: 7.6 G/DL (ref 12–16)
INR BLD: 1.54 (ref 0.87–1.14)
MAGNESIUM: 1.6 MG/DL (ref 1.8–2.4)
MCH RBC QN AUTO: 33.5 PG (ref 26–34)
MCHC RBC AUTO-ENTMCNC: 34.8 G/DL (ref 31–36)
MCV RBC AUTO: 96.2 FL (ref 80–100)
PDW BLD-RTO: 17.7 % (ref 12.4–15.4)
PHOSPHORUS: 1.3 MG/DL (ref 2.5–4.9)
PLATELET # BLD: 36 K/UL (ref 135–450)
PMV BLD AUTO: 8.2 FL (ref 5–10.5)
POTASSIUM SERPL-SCNC: 3.3 MMOL/L (ref 3.5–5.1)
PROTHROMBIN TIME: 18.4 SEC (ref 11.7–14.5)
RBC # BLD: 2.26 M/UL (ref 4–5.2)
SODIUM BLD-SCNC: 139 MMOL/L (ref 136–145)
URIC ACID, SERUM: 0.8 MG/DL (ref 2.6–6)
WBC # BLD: 0.1 K/UL (ref 4–11)

## 2022-06-23 PROCEDURE — 83735 ASSAY OF MAGNESIUM: CPT

## 2022-06-23 PROCEDURE — 99231 SBSQ HOSP IP/OBS SF/LOW 25: CPT | Performed by: SURGERY

## 2022-06-23 PROCEDURE — 97535 SELF CARE MNGMENT TRAINING: CPT

## 2022-06-23 PROCEDURE — 6370000000 HC RX 637 (ALT 250 FOR IP): Performed by: NURSE PRACTITIONER

## 2022-06-23 PROCEDURE — 84100 ASSAY OF PHOSPHORUS: CPT

## 2022-06-23 PROCEDURE — 6360000002 HC RX W HCPCS: Performed by: INTERNAL MEDICINE

## 2022-06-23 PROCEDURE — C9113 INJ PANTOPRAZOLE SODIUM, VIA: HCPCS | Performed by: NURSE PRACTITIONER

## 2022-06-23 PROCEDURE — 85730 THROMBOPLASTIN TIME PARTIAL: CPT

## 2022-06-23 PROCEDURE — 84550 ASSAY OF BLOOD/URIC ACID: CPT

## 2022-06-23 PROCEDURE — 2060000000 HC ICU INTERMEDIATE R&B

## 2022-06-23 PROCEDURE — 6360000002 HC RX W HCPCS: Performed by: NURSE PRACTITIONER

## 2022-06-23 PROCEDURE — 97530 THERAPEUTIC ACTIVITIES: CPT

## 2022-06-23 PROCEDURE — 85610 PROTHROMBIN TIME: CPT

## 2022-06-23 PROCEDURE — 36592 COLLECT BLOOD FROM PICC: CPT

## 2022-06-23 PROCEDURE — 2500000003 HC RX 250 WO HCPCS: Performed by: NURSE PRACTITIONER

## 2022-06-23 PROCEDURE — 2580000003 HC RX 258: Performed by: NURSE PRACTITIONER

## 2022-06-23 PROCEDURE — 6370000000 HC RX 637 (ALT 250 FOR IP): Performed by: INTERNAL MEDICINE

## 2022-06-23 PROCEDURE — 85025 COMPLETE CBC W/AUTO DIFF WBC: CPT

## 2022-06-23 PROCEDURE — 80048 BASIC METABOLIC PNL TOTAL CA: CPT

## 2022-06-23 RX ADMIN — ACYCLOVIR SODIUM 240 MG: 50 INJECTION, SOLUTION INTRAVENOUS at 09:17

## 2022-06-23 RX ADMIN — ONDANSETRON 8 MG: 2 INJECTION INTRAMUSCULAR; INTRAVENOUS at 21:55

## 2022-06-23 RX ADMIN — FILGRASTIM-AAFI 300 MCG: 300 INJECTION, SOLUTION SUBCUTANEOUS at 18:48

## 2022-06-23 RX ADMIN — SODIUM CHLORIDE, PRESERVATIVE FREE 10 ML: 5 INJECTION INTRAVENOUS at 20:28

## 2022-06-23 RX ADMIN — PIPERACILLIN AND TAZOBACTAM 4500 MG: 4; .5 INJECTION, POWDER, LYOPHILIZED, FOR SOLUTION INTRAVENOUS at 02:57

## 2022-06-23 RX ADMIN — PIPERACILLIN AND TAZOBACTAM 4500 MG: 4; .5 INJECTION, POWDER, LYOPHILIZED, FOR SOLUTION INTRAVENOUS at 20:33

## 2022-06-23 RX ADMIN — SODIUM CHLORIDE 15 ML: 900 IRRIGANT IRRIGATION at 11:51

## 2022-06-23 RX ADMIN — SODIUM CHLORIDE 15 ML: 900 IRRIGANT IRRIGATION at 20:37

## 2022-06-23 RX ADMIN — POTASSIUM CHLORIDE: 2 INJECTION, SOLUTION, CONCENTRATE INTRAVENOUS at 15:56

## 2022-06-23 RX ADMIN — POTASSIUM CHLORIDE 20 MEQ: 400 INJECTION, SOLUTION INTRAVENOUS at 04:45

## 2022-06-23 RX ADMIN — POTASSIUM CHLORIDE: 2 INJECTION, SOLUTION, CONCENTRATE INTRAVENOUS at 01:01

## 2022-06-23 RX ADMIN — ACYCLOVIR SODIUM 240 MG: 50 INJECTION, SOLUTION INTRAVENOUS at 20:30

## 2022-06-23 RX ADMIN — PROCHLORPERAZINE MALEATE 10 MG: 10 TABLET ORAL at 18:47

## 2022-06-23 RX ADMIN — ONDANSETRON 8 MG: 2 INJECTION INTRAMUSCULAR; INTRAVENOUS at 15:13

## 2022-06-23 RX ADMIN — MORPHINE SULFATE 2 MG: 2 INJECTION, SOLUTION INTRAMUSCULAR; INTRAVENOUS at 20:33

## 2022-06-23 RX ADMIN — PANTOPRAZOLE SODIUM 40 MG: 40 INJECTION, POWDER, FOR SOLUTION INTRAVENOUS at 08:58

## 2022-06-23 RX ADMIN — POTASSIUM CHLORIDE 20 MEQ: 400 INJECTION, SOLUTION INTRAVENOUS at 05:51

## 2022-06-23 RX ADMIN — POTASSIUM PHOSPHATE, MONOBASIC AND POTASSIUM PHOSPHATE, DIBASIC 30 MMOL: 224; 236 INJECTION, SOLUTION, CONCENTRATE INTRAVENOUS at 06:50

## 2022-06-23 RX ADMIN — PIPERACILLIN AND TAZOBACTAM 4500 MG: 4; .5 INJECTION, POWDER, LYOPHILIZED, FOR SOLUTION INTRAVENOUS at 15:19

## 2022-06-23 RX ADMIN — MORPHINE SULFATE 2 MG: 2 INJECTION, SOLUTION INTRAMUSCULAR; INTRAVENOUS at 02:59

## 2022-06-23 RX ADMIN — MORPHINE SULFATE 2 MG: 2 INJECTION, SOLUTION INTRAMUSCULAR; INTRAVENOUS at 09:19

## 2022-06-23 RX ADMIN — POTASSIUM CHLORIDE 20 MEQ: 400 INJECTION, SOLUTION INTRAVENOUS at 08:56

## 2022-06-23 RX ADMIN — PROCHLORPERAZINE MALEATE 10 MG: 10 TABLET ORAL at 20:28

## 2022-06-23 RX ADMIN — FLUCONAZOLE 400 MG: 2 INJECTION, SOLUTION INTRAVENOUS at 11:51

## 2022-06-23 RX ADMIN — CITALOPRAM HYDROBROMIDE 20 MG: 20 TABLET ORAL at 20:28

## 2022-06-23 RX ADMIN — ONDANSETRON 8 MG: 2 INJECTION INTRAMUSCULAR; INTRAVENOUS at 06:48

## 2022-06-23 RX ADMIN — PIPERACILLIN AND TAZOBACTAM 4500 MG: 4; .5 INJECTION, POWDER, LYOPHILIZED, FOR SOLUTION INTRAVENOUS at 09:04

## 2022-06-23 RX ADMIN — POTASSIUM CHLORIDE 20 MEQ: 400 INJECTION, SOLUTION INTRAVENOUS at 06:54

## 2022-06-23 ASSESSMENT — PAIN SCALES - GENERAL
PAINLEVEL_OUTOF10: 4
PAINLEVEL_OUTOF10: 4
PAINLEVEL_OUTOF10: 0
PAINLEVEL_OUTOF10: 4

## 2022-06-23 ASSESSMENT — PAIN DESCRIPTION - FREQUENCY
FREQUENCY: INTERMITTENT

## 2022-06-23 ASSESSMENT — PAIN DESCRIPTION - ONSET
ONSET: ON-GOING

## 2022-06-23 ASSESSMENT — PAIN - FUNCTIONAL ASSESSMENT
PAIN_FUNCTIONAL_ASSESSMENT: ACTIVITIES ARE NOT PREVENTED
PAIN_FUNCTIONAL_ASSESSMENT: PREVENTS OR INTERFERES SOME ACTIVE ACTIVITIES AND ADLS

## 2022-06-23 ASSESSMENT — PAIN DESCRIPTION - DESCRIPTORS
DESCRIPTORS: ACHING
DESCRIPTORS: ACHING;DULL

## 2022-06-23 ASSESSMENT — PAIN DESCRIPTION - ORIENTATION
ORIENTATION: RIGHT;LEFT;UPPER
ORIENTATION: UPPER;LEFT;RIGHT
ORIENTATION: INNER
ORIENTATION: RIGHT;LEFT;UPPER

## 2022-06-23 ASSESSMENT — PAIN DESCRIPTION - PAIN TYPE
TYPE: ACUTE PAIN

## 2022-06-23 ASSESSMENT — PAIN DESCRIPTION - LOCATION
LOCATION: ABDOMEN

## 2022-06-23 ASSESSMENT — PAIN SCALES - WONG BAKER: WONGBAKER_NUMERICALRESPONSE: 0

## 2022-06-23 NOTE — PROGRESS NOTES
Occupational Therapy  Facility/Department: [address] CANCER [place]  Daily Treatment Note  NAME: Israel Mims  : 1970  MRN: [id number]    Date of Service: 2022    Discharge Recommendations:  Israel Mims scored a 21/24 on the AM-PAC ADL Inpatient form. Current research shows that an AM-PAC score of 18 or greater is typically associated with a discharge to the patient's home setting. Based on the patient's AM-PAC score, and their current ADL deficits, it is recommended that the patient have 2-3 sessions per week of Occupational Therapy at d/c to increase the patient's independence. At this time, this patient demonstrates the endurance and safety to discharge home with home services and a follow up treatment frequency of 2-3x/wk. Please see assessment section for further patient specific details. If patient discharges prior to next session this note will serve as a discharge summary. Please see below for the latest assessment towards goals. OT Equipment Recommendations  Other: continue to assess      Patient Diagnosis(es): There were no encounter diagnoses. Assessment    Assessment: Functioning at Supervision level for functional transfers/mobility and ADLs. Pt w/ c/o abd distention and discomfort - although not functionally limiting pt. HR elevated throughout session - 130s-140s. Tolerated 14 minutes of standing. Anticipate pt to continue to make good progress throughout stay. Recommend 24 hr A. Continue per POC. Other: continue to assess      Plan   Plan  Times per Week: 2-5x  Times per Day: Daily  Current Treatment Recommendations: Strengthening;Balance training;Functional mobility training; Endurance training; Safety education & training;Self-Care / ADL; Home management training     Restrictions   Up as Tolerated    Subjective   Subjective  Subjective: In bed on arrival. Gisselle Díaz we going to take a walk. \" \"Dr. Angela Marie says if I don't get out of this bed, he's going to send me to a nursing Frame for Short term goals: Discharge  Short Term Goal 1: pt will maintain functional independence with ADLs during hospital stay  Short Term Goal 2: pt will verbalize / demonstrate independence with self report activity log and HEP  Short Term Goal 3: New goal 6/20: Increase activity tolerance in stance for adls to 8 min- not met; revised goal: increase activity tolerance to 20 minutes for ADLs (not met)  Patient Goals   Patient goals : Go home. Be independent.        Therapy Time   Individual Concurrent Group Co-treatment   Time In 1046         Time Out 1116         Minutes 30                 Kelly Mar OTR/L #[id number]

## 2022-06-23 NOTE — PROGRESS NOTES
Nutrition Note       NUTRITION RECOMMENDATIONS:   1. PO Diet: Continue clear liquid diet per MD   2. ONS: Start Ensure Clear TID     NUTRITION ASSESSMENT:  Nutritional summary & status: Diet upgraded yesterday to clear liquids. NGT removed yesterday. Noted some abdominal discomfort, no nausea. Will send Ensure clear to increase po intakes. Wt stable since adm. Pt with good po intakes prior to being made NPO. Hopeful po will improve again.  Admission/PMH: auto 6/15/22 / HLD, HTN, CAD, HF     MALNUTRITION ASSESSMENT  Context of Malnutrition: Acute Illness (on chronic)   Malnutrition Status: At risk for malnutrition (Comment) (clears)    NUTRITION DIAGNOSIS   Increased nutrient needs related to catabolic illness as evidenced by other (comment) (BMT)    [address] and/or Nutrient Delivery:  Continue Current Diet  Nutrition Education/Counseling:  Education completed       The patient will still be monitored per nutrition standards of care. Consult dietitian if nutrition interventions essential to patient care is needed.      Liliya Montoya, [address] [place] [address], [address] [place] [address] Drive:  [phone]  Office:  [phone]

## 2022-06-23 NOTE — PLAN OF CARE
Problem: Safety - Adult  Goal: Free from fall injury  6/23/2022 0114 by Toshia Gonzalez RN  Outcome: Progressing  Note: Pt is a High fall risk. See Ngoc Lux Fall Score and ABCDS Injury Risk assessments.   + Screening for Orthostasis and/or + High Fall Risk per ROSALES/ABCDS: Explained fall risk precautions to pt and rationale behind their use to keep the patient safe. Pt bed is in low position, side rails up, call light and belongings are in reach. Fall wristband applied and present on pts wrist.  Bed alarm on. Pt encouraged to call for assistance. Will continue with hourly rounds for PO intake, pain needs, toileting and repositioning as needed. Problem: ABCDS Injury Assessment  Goal: Absence of physical injury  6/23/2022 0114 by Toshia Gonzalez RN  Outcome: Progressing     Problem: Pain  Goal: Verbalizes/displays adequate comfort level or baseline comfort level  6/23/2022 0114 by Toshia Gonzalez RN  Outcome: Progressing  Note: Pt complaining of 4/10 abdomen pain. Pt medicated with PRN morphine per orders (see eMAR). Pt instructed to call for new/increasing pain levels. Pt verbalized understanding. Will continue to monitor.       Problem: Chronic Conditions and Co-morbidities  Goal: Patient's chronic conditions and co-morbidity symptoms are monitored and maintained or improved  6/23/2022 0114 by Toshia Gonzalez RN  Outcome: Progressing     Problem: Infection - Adult  Goal: Absence of infection during hospitalization  6/23/2022 0114 by Toshia Gonzalez RN  Outcome: Progressing     Problem: Metabolic/Fluid and Electrolytes - Adult  Goal: Hemodynamic stability and optimal renal function maintained  6/23/2022 0114 by Toshia Gonzalez RN  Outcome: Progressing     Problem: Hematologic - Adult  Goal: Maintains hematologic stability  6/23/2022 0114 by Toshia Gonzalez RN  Outcome: Progressing  Note:   Patient's hemoglobin this AM: Recent Labs     06/23/22  0335   HGB 7.6*     Patient's platelet count this AM:   Recent Labs     06/23/22  0335   PLT 36*    Thrombocytopenia Precautions in place. Patient showing no signs or symptoms of active bleeding. Transfusion not indicated at this time. Patient verbalizes understanding of all instructions. Will continue to assess and implement POC. Call light within reach and hourly rounding in place.

## 2022-06-23 NOTE — FLOWSHEET NOTE
06/23/22 1144   Encounter Summary   Encounter Overview/Reason  Spiritual/Emotional Needs   Service Provided For: Patient   Referral/Consult From: [address] Family members   Last Encounter    (es 6/23)   Complexity of Encounter Low   Begin Time 1110   End Time  1116   Total Time Calculated 6 min   Assessment/Intervention/Outcome   Assessment Calm;Coping; Hopeful   Intervention Active listening;Nurtured Hope   Outcome Engaged in conversation; Optimistic;Receptive   Plan and Referrals   Plan/Referrals Other (Comment)  (as needed)

## 2022-06-23 NOTE — PROGRESS NOTES
[address] [employer] Progress Note    2022     Sylvia Ashford    MRN: [id number]    : 1970    Referring MD: No referring provider defined for this encounter. SUBJECTIVE: BM this AM, but the abdomen is tender.     ECOG PS:(2) Ambulatory and capable of self care, unable to carry out work activity, up and about > 50% or waking hours     KPS: 60% Requires occasional assistance, but is able to care for most of his personal needs    Isolation: None    Medications    Scheduled Meds:   fluconazole  400 mg IntraVENous Q24H    piperacillin-tazobactam  4,500 mg IntraVENous Q6H    pantoprazole  40 mg IntraVENous Daily    acyclovir  5 mg/kg (Ideal) IntraVENous Q12H    benzocaine   Mouth/Throat 4x Daily    ondansetron  8 mg IntraVENous 3 times per day    filgrastim-aafi  300 mcg SubCUTAneous QPM    sodium chloride flush  5-40 mL IntraVENous 2 times per day    Saline Mouthwash  15 mL Swish & Spit 4x Daily AC & HS    citalopram  20 mg Oral Daily     Continuous Infusions:   IV infusion builder 75 mL/hr at 22 0101    sodium chloride      sodium chloride      sodium chloride      sodium chloride      sodium chloride 20 mL/hr at 06/10/22 1838    sodium chloride       PRN Meds:.acetaminophen, sodium chloride, morphine **OR** morphine, sodium chloride, sodium chloride, potassium phosphate IVPB, calcium carbonate, sodium chloride, diphenhydrAMINE, sodium chloride, sodium chloride flush, sodium chloride, potassium chloride, magnesium sulfate, magnesium hydroxide, Saline Mouthwash, alteplase (CATHFLO) with sterile water injection, prochlorperazine **OR** prochlorperazine, LORazepam **OR** LORazepam, busPIRone, diphenhydrAMINE, hydrOXYzine pamoate    ROS:  As noted above, otherwise remainder of 10-point ROS negative    Physical Exam:     I&O:      Intake/Output Summary (Last 24 hours) at 2022 0739  Last data filed at 2022 0651  Gross per 24 hour   Intake 3560.3 ml   Output 700 ml   Net 2860.3 ml Vital Signs:  /64   Pulse (!) 127   Temp 98.1 °F (36.7 °C) (Oral)   Resp 20   Ht 5' 1\" (1.549 m)   Wt 184 lb 9.6 oz (83.7 kg)   SpO2 94%   BMI 34.88 kg/m²     Weight:    Wt Readings from Last 3 Encounters:   06/22/22 184 lb 9.6 oz (83.7 kg)   06/08/22 172 lb 12.8 oz (78.4 kg)   05/27/22 170 lb (77.1 kg)     General: Awake, alert and oriented. HEENT: normocephalic, PERRL, no scleral erythema or icterus, Oral mucosa moist and intact, throat clear  NECK: supple  BACK: Straight   SKIN: warm dry and intact without lesions rashes or masses  CHEST: CTA bilaterally without use of accessory muscles  CV: Normal S1 S2, RRR, no MRG  ABD: mildly distended, tender to percussion with increased tympany, no palpable masses or hepatosplenomegaly  EXTREMITIES: without edema, denies calf tenderness  NEURO: CN II - XII grossly intact  CATHETER: rt upper chest wall heath intact at the insertion site     Data    CBC:   Recent Labs     06/21/22 2036 06/22/22 0415 06/23/22  0335   WBC 0.0* 0.1* 0.1*   HGB 6.7* 7.4* 7.6*   HCT 19.2* 21.2* 21.7*   .2* 95.6 96.2   PLT 14* 60* 36*     BMP/Mag:  Recent Labs     06/21/22  0300 06/22/22  0415 06/23/22  0335   * 137 139   K 3.3* 3.0* 3.3*    107 109   CO2 21 21 19*   PHOS 1.4* 2.0* 1.3*   BUN 4* 5* 3*   CREATININE <0.5* <0.5* <0.5*   MG  --   --  1.60*     LIVP:   Recent Labs     06/22/22  0415   AST 9*   ALT 19   BILIDIR 0.9*   BILITOT 1.4*   ALKPHOS 89     Coags:   Recent Labs     06/23/22  0335   PROTIME 18.4*   INR 1.54*   APTT 38.2*     Uric Acid   Recent Labs     06/21/22  0300 06/22/22  0415 06/23/22  0335   LABURIC 1.3* 0.8* 0.8*       PROBLEM LIST:             1. DLBC NHL  2. COVID19 PNA  3. Anxiety / H/o Panic Attacks  4. HLD  5. CAD  6. Chronic systolic HF      TREATMENT:            1. R-CHOP x 6 cycles (2/21/21-6/4/21)  - Imbruvica added with cycle #2  2. R-ICE x 2 cycles 3/28/22-4/19/22  3.  BEAM followed by Auto SCT on 6/15/22    ASSESSMENT AND PLAN:           1. [facility] NHL: R/R, now in CR  - PET/CT 5/4/22: CR  - BM Bx/Asp 5/4/22: OMARI     Auto Day +8     2. ID:   6/21 early AM, likely GI source, currently Afebrile   - Cont Diflucan & Acyclovir ppx; cont acyclovir at discharge for VZV positive titer. - Cont Zosyn Day +2 (6/21/22)  - Blood Cxs 6/20 - NGTD (drawn prior to fever)  Sepsis: Hypotension, tachycardia, fever - improved, Afebrile and BP improved  - Recheck lactic acid & procalcitonin - stable  - Cont IVFs at 75mL/hr     3. Heme: Pancytopenia 2/2 chemotherapy  - Transfuse for Hgb < 7 and Platelets < 41Q  - No transfusion today  - Cont daily GCSF (5/70/09)     4. Metabolic: HypoK, HypoMag & HypoPhos, Renal fx stable   - Change IVFs: D5NS w/40KCl at 75ml/hr   - Replace K+, Mg, & Phos per PRN orders     5. Cardiac: H/o CHF, CAD, HLD  - Cleared for transplant by Dr. Anton Walsh  HLD:   - Does not tolerate statins  - Consider Leqvio after transplant per Dr. Anton Walsh  CAD:   - Angiogram 2019 with borderline lesions but no PCI performed  - ASA STOPPED 7/10   Chronic Systolic HF:   - Echo 7/9/26 w/EF 45-50%  - Stress Test 5/26/22: No evidence of ischemia or scar.  LVEF (73%) & LV wall motion is normal  - HOLD Toprol XL 25mg daily   - Will likely benefit from jardiance - cardiology to assess following BMT     6. Pulmonary:   - Pre-Txp PFTs: FEV1/% / DLCOcor 83%  - Encourage IS & ambulation      7. GI / Nutrition:   Nutrition: NGT removed without difficulty, CLD started 6/22/22  - NPO at this time 2/2 partial SBO 6/21/22  - Follow closely with dietary, she was doing well with PO intake prior to this acute event so hopefully this will resolve quickly without need for dietary intervention  Diarrhea:  - C.diff neg 6/18  - D/c imodium with partial SBO   Nausea:   - Cont scheduled zofran (IV only)  - Compazine PRN  - Cont PPI daily   Partial SBO: terminal ileum 6/21/22  - Cont CLD  - General surgery consulted3  - NGT placed 6/21 evening - 250mL output - NGT removed 6/22/22  - Monitor with serial abd exam/X-rays     8. Psych: H/o panic attacked (heart palpitations & chest discomfort) since 1988  - Cont buspar 15mg BID PRN  - Cont celexa 20mg daily     9. MSK:   - PT/OT consulted for prehabilitation / mobility program      - DVT Prophylaxis: Platelets <00,682 cells/dL - prophylactic lovenox on hold and mechanical prophylaxis with bilateral SCDs while in bed in place.   Contraindications to pharmacologic prophylaxis: Thrombocytopenia  Contraindications to mechanical prophylaxis: None      - Disposition:  Once ANC >1 and recovered from toxicities of transplant    Julian Chau MD  [facility]  Please contact me through [facility]

## 2022-06-23 NOTE — PLAN OF CARE
Problem: Safety - Adult  Goal: Free from fall injury  Outcome: Progressing   Pt remains free of falls; up ad dana with steady gait. Patient's BP was orthostatic negative this shift. Bed in lowest position, wheels locked, side rails up 2/4. Possessions and call light within reach; pt uses call light appropriately. Will continue to monitor. Patient able to walk in room but will need to call out to nurse if wants to wlk in hallways. Problem: Pain  Goal: Verbalizes/displays adequate comfort level or baseline comfort level  Outcome: Progressing   Patient complained 4/10 for abdominal pain. Patient will continue to call out to request pain medication to stay on top of pain management. Problem: Infection - Adult  Goal: Absence of infection during hospitalization  Outcome: Progressing   CVC site remains free of signs/symptoms of infection. No drainage, edema, erythema, pain, or warmth noted at site. Dressing changes continue per protocol and on an as needed basis - see flowsheet. Compliant with BCC Bath Protocol:  Performed CHG bath today per [facility] protocol utilizing CHG solution in the shower. CVC site cleansed with CHG wipe over dressing, skin surrounding dressing, and first 6\" of IV tubing. Pt tolerated well. Continued to encourage daily CHG bathing per [facility] protocol. Problem: Hematologic - Adult  Goal: Maintains hematologic stability  Outcome: Progressing  Patient's hemoglobin this AM:   Recent Labs     06/23/22  0335   HGB 7.6*     Patient's platelet count this AM:   Recent Labs     06/23/22  0335   PLT 36*    Thrombocytopenia not present at this time. Patient showing no signs or symptoms of active bleeding. Transfusion not indicated at this time. Patient verbalizes understanding of all instructions. Will continue to assess and implement POC. Call light within reach and hourly rounding in place. Patient's hemoglobin this      Patient continues to be SR tackycardia, denies any cardiac s/s. Patient was able to tolerate walking in the serna ways but HR increased to 140's but decreased to 120's pt's baseline with rest. NP kristy consulted, no new orders placed. Will continue to monitor.

## 2022-06-23 NOTE — PROGRESS NOTES
Surgery Daily Progress Note      CC: partial SBO    SUBJECTIVE:  No acute events overnight, remains tachycardic but HDS. NG removed yesterday. Tolerated CLD. Having bowel movements. Still complaining of some abdominal discomfort, no nausea. ROS:   A 14 point review of systems was conducted, significant findings as noted above. All other systems negative.     OBJECTIVE:    PHYSICAL EXAM:  Vitals:    06/22/22 1525 06/22/22 2042 06/22/22 2300 06/23/22 0300   BP: 119/74 113/74 116/72 111/64   Pulse: (!) 130 (!) 132 (!) 128 (!) 127   Resp: 20 18 23 20   Temp: 97.8 °F (36.6 °C) 97.9 °F (36.6 °C) 98.7 °F (37.1 °C) 98.1 °F (36.7 °C)   TempSrc: Oral Oral Oral Oral   SpO2: 95% 97% 96% 94%   Weight:       Height:           General appearance: alert, resting in bed, in NAD  Neuro: A&Ox3, no focal deficits, sensation intact  HEENT: NCAT  Chest/Lungs: normal effort, no accessory muscle use, on RA  Cardiovascular: tachycardic regular rhythm  Abdomen: soft, minimally distended improved from prior , non-tender, no guarding or rigidity  Extremities: no edema, no cyanosis      ASSESSMENT & PLAN:   This is a 45 y.o. female with a diagnosis of Diffuse Large B Cell Lymphoma s/p BEAM chemotherapy and autologous stem cell transplant with evidence of partial small bowel obstruction    - continue CLD, ADAT per primary team  - Replace potassium, magnesium to optimize bowel function  - PT/OT  - Further management per primary team      Jana Infante DO  06/23/22  7:15 AM

## 2022-06-24 ENCOUNTER — APPOINTMENT (OUTPATIENT)
Dept: GENERAL RADIOLOGY | Age: 52
DRG: 016 | End: 2022-06-24
Attending: INTERNAL MEDICINE
Payer: COMMERCIAL

## 2022-06-24 LAB
ALBUMIN SERPL-MCNC: 2.5 G/DL (ref 3.4–5)
ALP BLD-CCNC: 101 U/L (ref 40–129)
ALT SERPL-CCNC: 13 U/L (ref 10–40)
ANION GAP SERPL CALCULATED.3IONS-SCNC: 10 MMOL/L (ref 3–16)
ANISOCYTOSIS: ABNORMAL
AST SERPL-CCNC: 7 U/L (ref 15–37)
BACTERIA: ABNORMAL /HPF
BANDED NEUTROPHILS RELATIVE PERCENT: 12 % (ref 0–7)
BASOPHILS ABSOLUTE: 0 K/UL (ref 0–0.2)
BASOPHILS RELATIVE PERCENT: 0 %
BILIRUB SERPL-MCNC: 0.6 MG/DL (ref 0–1)
BILIRUBIN DIRECT: 0.4 MG/DL (ref 0–0.3)
BILIRUBIN URINE: NEGATIVE
BILIRUBIN, INDIRECT: 0.2 MG/DL (ref 0–1)
BLOOD CULTURE, ROUTINE: NORMAL
BLOOD, URINE: ABNORMAL
BUN BLDV-MCNC: <2 MG/DL (ref 7–20)
CALCIUM SERPL-MCNC: 7.6 MG/DL (ref 8.3–10.6)
CHLORIDE BLD-SCNC: 105 MMOL/L (ref 99–110)
CLARITY: CLEAR
CO2: 20 MMOL/L (ref 21–32)
COLOR: YELLOW
CREAT SERPL-MCNC: <0.5 MG/DL (ref 0.6–1.1)
CULTURE, BLOOD 2: NORMAL
EOSINOPHILS ABSOLUTE: 0 K/UL (ref 0–0.6)
EOSINOPHILS RELATIVE PERCENT: 0 %
EPITHELIAL CELLS, UA: ABNORMAL /HPF (ref 0–5)
GFR AFRICAN AMERICAN: >60
GFR NON-AFRICAN AMERICAN: >60
GLUCOSE BLD-MCNC: 107 MG/DL (ref 70–99)
GLUCOSE URINE: NEGATIVE MG/DL
HCT VFR BLD CALC: 21.9 % (ref 36–48)
HEMOGLOBIN: 7.3 G/DL (ref 12–16)
KETONES, URINE: NEGATIVE MG/DL
LACTATE DEHYDROGENASE: 113 U/L (ref 100–190)
LEUKOCYTE ESTERASE, URINE: NEGATIVE
LYMPHOCYTES ABSOLUTE: 0.1 K/UL (ref 1–5.1)
LYMPHOCYTES RELATIVE PERCENT: 13 %
MACROCYTES: ABNORMAL
MAGNESIUM: 1.4 MG/DL (ref 1.8–2.4)
MCH RBC QN AUTO: 32.7 PG (ref 26–34)
MCHC RBC AUTO-ENTMCNC: 33.2 G/DL (ref 31–36)
MCV RBC AUTO: 98.8 FL (ref 80–100)
MICROCYTES: ABNORMAL
MICROSCOPIC EXAMINATION: YES
MONOCYTES ABSOLUTE: 0.1 K/UL (ref 0–1.3)
MONOCYTES RELATIVE PERCENT: 19 %
MUCUS: ABNORMAL /LPF
MYELOCYTE PERCENT: 1 %
NEUTROPHILS ABSOLUTE: 0.4 K/UL (ref 1.7–7.7)
NEUTROPHILS RELATIVE PERCENT: 55 %
NITRITE, URINE: NEGATIVE
PDW BLD-RTO: 17.8 % (ref 12.4–15.4)
PH UA: 6 (ref 5–8)
PHOSPHORUS: 1.7 MG/DL (ref 2.5–4.9)
PLATELET # BLD: 23 K/UL (ref 135–450)
PLATELET SLIDE REVIEW: ABNORMAL
PMV BLD AUTO: 8 FL (ref 5–10.5)
POTASSIUM SERPL-SCNC: 3.3 MMOL/L (ref 3.5–5.1)
PROTEIN UA: NEGATIVE MG/DL
RBC # BLD: 2.22 M/UL (ref 4–5.2)
RBC UA: ABNORMAL /HPF (ref 0–4)
RENAL EPITHELIAL, UA: ABNORMAL /HPF (ref 0–1)
SCHISTOCYTES: ABNORMAL
SLIDE REVIEW: ABNORMAL
SODIUM BLD-SCNC: 135 MMOL/L (ref 136–145)
SPECIFIC GRAVITY UA: 1.02 (ref 1–1.03)
TEAR DROP CELLS: ABNORMAL
TOTAL PROTEIN: 4.3 G/DL (ref 6.4–8.2)
URIC ACID, SERUM: 0.7 MG/DL (ref 2.6–6)
URINE TYPE: ABNORMAL
UROBILINOGEN, URINE: 0.2 E.U./DL
WBC # BLD: 0.6 K/UL (ref 4–11)
WBC UA: ABNORMAL /HPF (ref 0–5)

## 2022-06-24 PROCEDURE — 6360000002 HC RX W HCPCS: Performed by: NURSE PRACTITIONER

## 2022-06-24 PROCEDURE — 36592 COLLECT BLOOD FROM PICC: CPT

## 2022-06-24 PROCEDURE — 6370000000 HC RX 637 (ALT 250 FOR IP): Performed by: INTERNAL MEDICINE

## 2022-06-24 PROCEDURE — 87205 SMEAR GRAM STAIN: CPT

## 2022-06-24 PROCEDURE — 87106 FUNGI IDENTIFICATION YEAST: CPT

## 2022-06-24 PROCEDURE — 6360000002 HC RX W HCPCS: Performed by: INTERNAL MEDICINE

## 2022-06-24 PROCEDURE — 2500000003 HC RX 250 WO HCPCS: Performed by: NURSE PRACTITIONER

## 2022-06-24 PROCEDURE — 2580000003 HC RX 258: Performed by: NURSE PRACTITIONER

## 2022-06-24 PROCEDURE — 99231 SBSQ HOSP IP/OBS SF/LOW 25: CPT | Performed by: SURGERY

## 2022-06-24 PROCEDURE — 87070 CULTURE OTHR SPECIMN AEROBIC: CPT

## 2022-06-24 PROCEDURE — 2060000000 HC ICU INTERMEDIATE R&B

## 2022-06-24 PROCEDURE — 87252 VIRUS INOCULATION TISSUE: CPT

## 2022-06-24 PROCEDURE — 87040 BLOOD CULTURE FOR BACTERIA: CPT

## 2022-06-24 PROCEDURE — 83735 ASSAY OF MAGNESIUM: CPT

## 2022-06-24 PROCEDURE — 80048 BASIC METABOLIC PNL TOTAL CA: CPT

## 2022-06-24 PROCEDURE — 83615 LACTATE (LD) (LDH) ENZYME: CPT

## 2022-06-24 PROCEDURE — 81001 URINALYSIS AUTO W/SCOPE: CPT

## 2022-06-24 PROCEDURE — 87103 BLOOD FUNGUS CULTURE: CPT

## 2022-06-24 PROCEDURE — 87086 URINE CULTURE/COLONY COUNT: CPT

## 2022-06-24 PROCEDURE — 97116 GAIT TRAINING THERAPY: CPT

## 2022-06-24 PROCEDURE — 94761 N-INVAS EAR/PLS OXIMETRY MLT: CPT

## 2022-06-24 PROCEDURE — 71045 X-RAY EXAM CHEST 1 VIEW: CPT

## 2022-06-24 PROCEDURE — 84100 ASSAY OF PHOSPHORUS: CPT

## 2022-06-24 PROCEDURE — 6360000002 HC RX W HCPCS: Performed by: STUDENT IN AN ORGANIZED HEALTH CARE EDUCATION/TRAINING PROGRAM

## 2022-06-24 PROCEDURE — 80076 HEPATIC FUNCTION PANEL: CPT

## 2022-06-24 PROCEDURE — 87253 VIRUS INOCULATE TISSUE ADDL: CPT

## 2022-06-24 PROCEDURE — 97530 THERAPEUTIC ACTIVITIES: CPT

## 2022-06-24 PROCEDURE — 6370000000 HC RX 637 (ALT 250 FOR IP): Performed by: NURSE PRACTITIONER

## 2022-06-24 PROCEDURE — 87102 FUNGUS ISOLATION CULTURE: CPT

## 2022-06-24 PROCEDURE — 84550 ASSAY OF BLOOD/URIC ACID: CPT

## 2022-06-24 PROCEDURE — 85025 COMPLETE CBC W/AUTO DIFF WBC: CPT

## 2022-06-24 RX ORDER — PANTOPRAZOLE SODIUM 40 MG/1
40 TABLET, DELAYED RELEASE ORAL
Status: DISCONTINUED | OUTPATIENT
Start: 2022-06-24 | End: 2022-06-27 | Stop reason: HOSPADM

## 2022-06-24 RX ORDER — VALACYCLOVIR HYDROCHLORIDE 500 MG/1
500 TABLET, FILM COATED ORAL 2 TIMES DAILY
Status: DISCONTINUED | OUTPATIENT
Start: 2022-06-24 | End: 2022-06-27 | Stop reason: HOSPADM

## 2022-06-24 RX ORDER — MAGNESIUM SULFATE IN WATER 40 MG/ML
4000 INJECTION, SOLUTION INTRAVENOUS ONCE
Status: COMPLETED | OUTPATIENT
Start: 2022-06-24 | End: 2022-06-24

## 2022-06-24 RX ORDER — FLUCONAZOLE 200 MG/1
400 TABLET ORAL DAILY
Status: DISCONTINUED | OUTPATIENT
Start: 2022-06-24 | End: 2022-06-25

## 2022-06-24 RX ORDER — OXYCODONE HYDROCHLORIDE 5 MG/1
5 TABLET ORAL EVERY 4 HOURS PRN
Status: DISCONTINUED | OUTPATIENT
Start: 2022-06-24 | End: 2022-06-26

## 2022-06-24 RX ORDER — PANTOPRAZOLE SODIUM 40 MG/1
40 TABLET, DELAYED RELEASE ORAL
Status: DISCONTINUED | OUTPATIENT
Start: 2022-06-25 | End: 2022-06-24

## 2022-06-24 RX ADMIN — FILGRASTIM-AAFI 300 MCG: 300 INJECTION, SOLUTION SUBCUTANEOUS at 18:11

## 2022-06-24 RX ADMIN — PIPERACILLIN AND TAZOBACTAM 4500 MG: 4; .5 INJECTION, POWDER, LYOPHILIZED, FOR SOLUTION INTRAVENOUS at 03:09

## 2022-06-24 RX ADMIN — SODIUM CHLORIDE 15 ML: 900 IRRIGANT IRRIGATION at 07:04

## 2022-06-24 RX ADMIN — MORPHINE SULFATE 2 MG: 2 INJECTION, SOLUTION INTRAMUSCULAR; INTRAVENOUS at 03:33

## 2022-06-24 RX ADMIN — FLUCONAZOLE 400 MG: 200 TABLET ORAL at 09:25

## 2022-06-24 RX ADMIN — POTASSIUM CHLORIDE: 2 INJECTION, SOLUTION, CONCENTRATE INTRAVENOUS at 05:32

## 2022-06-24 RX ADMIN — SODIUM CHLORIDE, PRESERVATIVE FREE 10 ML: 5 INJECTION INTRAVENOUS at 20:53

## 2022-06-24 RX ADMIN — PIPERACILLIN AND TAZOBACTAM 4500 MG: 4; .5 INJECTION, POWDER, LYOPHILIZED, FOR SOLUTION INTRAVENOUS at 09:01

## 2022-06-24 RX ADMIN — PIPERACILLIN AND TAZOBACTAM 4500 MG: 4; .5 INJECTION, POWDER, LYOPHILIZED, FOR SOLUTION INTRAVENOUS at 20:53

## 2022-06-24 RX ADMIN — POTASSIUM CHLORIDE 20 MEQ: 400 INJECTION, SOLUTION INTRAVENOUS at 05:01

## 2022-06-24 RX ADMIN — POTASSIUM CHLORIDE 20 MEQ: 400 INJECTION, SOLUTION INTRAVENOUS at 09:59

## 2022-06-24 RX ADMIN — BUSPIRONE HYDROCHLORIDE 15 MG: 5 TABLET ORAL at 19:14

## 2022-06-24 RX ADMIN — MAGNESIUM SULFATE HEPTAHYDRATE 4000 MG: 4 INJECTION, SOLUTION INTRAVENOUS at 15:42

## 2022-06-24 RX ADMIN — POTASSIUM CHLORIDE: 2 INJECTION, SOLUTION, CONCENTRATE INTRAVENOUS at 13:17

## 2022-06-24 RX ADMIN — CITALOPRAM HYDROBROMIDE 20 MG: 20 TABLET ORAL at 20:47

## 2022-06-24 RX ADMIN — ONDANSETRON 8 MG: 2 INJECTION INTRAMUSCULAR; INTRAVENOUS at 05:01

## 2022-06-24 RX ADMIN — ONDANSETRON 8 MG: 2 INJECTION INTRAMUSCULAR; INTRAVENOUS at 15:49

## 2022-06-24 RX ADMIN — METOPROLOL TARTRATE 12.5 MG: 25 TABLET, FILM COATED ORAL at 20:47

## 2022-06-24 RX ADMIN — PANTOPRAZOLE SODIUM 40 MG: 40 TABLET, DELAYED RELEASE ORAL at 09:55

## 2022-06-24 RX ADMIN — POTASSIUM CHLORIDE 20 MEQ: 400 INJECTION, SOLUTION INTRAVENOUS at 12:43

## 2022-06-24 RX ADMIN — PIPERACILLIN AND TAZOBACTAM 4500 MG: 4; .5 INJECTION, POWDER, LYOPHILIZED, FOR SOLUTION INTRAVENOUS at 15:44

## 2022-06-24 RX ADMIN — VALACYCLOVIR HYDROCHLORIDE 500 MG: 500 TABLET, FILM COATED ORAL at 09:25

## 2022-06-24 RX ADMIN — POTASSIUM PHOSPHATE, MONOBASIC AND POTASSIUM PHOSPHATE, DIBASIC 30 MMOL: 224; 236 INJECTION, SOLUTION, CONCENTRATE INTRAVENOUS at 05:47

## 2022-06-24 RX ADMIN — SODIUM CHLORIDE 15 ML: 900 IRRIGANT IRRIGATION at 20:54

## 2022-06-24 RX ADMIN — VALACYCLOVIR HYDROCHLORIDE 500 MG: 500 TABLET, FILM COATED ORAL at 20:48

## 2022-06-24 RX ADMIN — ACETAMINOPHEN 650 MG: 325 TABLET ORAL at 04:42

## 2022-06-24 RX ADMIN — POTASSIUM CHLORIDE 20 MEQ: 400 INJECTION, SOLUTION INTRAVENOUS at 06:17

## 2022-06-24 RX ADMIN — ONDANSETRON 8 MG: 2 INJECTION INTRAMUSCULAR; INTRAVENOUS at 20:47

## 2022-06-24 ASSESSMENT — PAIN SCALES - WONG BAKER: WONGBAKER_NUMERICALRESPONSE: 0

## 2022-06-24 ASSESSMENT — PAIN SCALES - GENERAL
PAINLEVEL_OUTOF10: 0
PAINLEVEL_OUTOF10: 5
PAINLEVEL_OUTOF10: 0
PAINLEVEL_OUTOF10: 0

## 2022-06-24 ASSESSMENT — PAIN DESCRIPTION - ORIENTATION: ORIENTATION: RIGHT;LEFT;UPPER

## 2022-06-24 ASSESSMENT — PAIN DESCRIPTION - PAIN TYPE: TYPE: ACUTE PAIN

## 2022-06-24 ASSESSMENT — PAIN DESCRIPTION - ONSET: ONSET: ON-GOING

## 2022-06-24 ASSESSMENT — PAIN DESCRIPTION - DESCRIPTORS: DESCRIPTORS: DISCOMFORT;CRAMPING

## 2022-06-24 ASSESSMENT — PAIN - FUNCTIONAL ASSESSMENT: PAIN_FUNCTIONAL_ASSESSMENT: PREVENTS OR INTERFERES SOME ACTIVE ACTIVITIES AND ADLS

## 2022-06-24 ASSESSMENT — PAIN DESCRIPTION - LOCATION
LOCATION: ABDOMEN
LOCATION: ABDOMEN

## 2022-06-24 ASSESSMENT — PAIN DESCRIPTION - FREQUENCY: FREQUENCY: INTERMITTENT

## 2022-06-24 NOTE — PLAN OF CARE
Problem: Safety - Adult  Goal: Free from fall injury  Outcome: Progressing  Pt remains free of falls; up ad dana with steady gait. Patient's BP was orthostatic negative this shift. Bed in lowest position, wheels locked, side rails up 2/4. Possessions and call light within reach; pt uses call light appropriately. Will continue to monitor. Stable/No isolation:  Pt with activity orders for up ad dana. Encouraged pt to be up OOB as much as possible throughout the day and for all meals. Encouraged frequent short naps as necessary to preserve energy but instructed that while awake, pt should be OOB. Continued to encourage activity in room as much as possible. Pt is visualized to be OOB 51-75% of the time this shift. Will continue to encourage frequent activity. Problem: Infection - Adult  Goal: Absence of infection during hospitalization  Outcome: Progressing    CVC site remains free of signs/symptoms of infection. No drainage, edema, erythema, pain, or warmth noted at site. Dressing changes continue per protocol and on an as needed basis - see flowsheet. Compliant with BCC Bath Protocol:  Performed CHG bath today per [facility] protocol utilizing CHG solution in the shower. CVC site cleansed with CHG wipe over dressing, skin surrounding dressing, and first 6\" of IV tubing. Pt tolerated well. Continued to encourage daily CHG bathing per [facility] [address] protocol. Problem: Infection - Adult  Goal: Absence of fever/infection during anticipated neutropenic period  Outcome: Progressing      Will continue to monitor for s/s of fever as in flushing, diaphoresis, and confusion. Will continue to take temp q 4 hours. Problem: Metabolic/Fluid and Electrolytes - Adult  Goal: Electrolytes maintained within normal limits  Outcome: Progressing   Will continue to monitor labs and replace electrolytes as needed. K+- 3.3 - replacement per orders given. Phosphate - 1.7 - replacement per orders given.    Mag+-1.40 replacement given per order. Problem: Metabolic/Fluid and Electrolytes - Adult  Goal: Hemodynamic stability and optimal renal function maintained  Outcome: Progressing   Patient's hemoglobin this AM:   Recent Labs     06/24/22  0313   HGB 7.3*     Patient's platelet count this AM:   Recent Labs     06/24/22  0313   PLT 23*    Thrombocytopenia not present at this time. Patient showing no signs or symptoms of active bleeding. Transfusion not indicated at this time. Patient verbalizes understanding of all instructions. Will continue to assess and implement POC. Call light within reach and hourly rounding in place.   Patient's hemoglobin this

## 2022-06-24 NOTE — PROGRESS NOTES
ambulation; decreased to 133 within few min after walk; RN aware (pt on portable monitor for gait in serna)     Plan   Plan  Plan:  (2-5)  Current Treatment Recommendations: Balance training,Strengthening,Functional mobility training,Transfer training,Gait training  Safety Devices  Type of Devices: Call light within reach,Left in chair,Nurse notified     Restrictions  Position Activity Restriction  Other position/activity restrictions: up as tolerated     Subjective   General  Chart Reviewed: Yes  Additional Pertinent Hx: PMH: CAD, CHF, NHL. pt admitted for auto SCT. Family / Caregiver Present: No  Referring Practitioner: Terra Alegria  Diagnosis: NHL  Follows Commands: Within Functional Limits  Subjective  Subjective: Found in bed, agreeable to PT. \"I was hoping you'd come! \"  RN reports pt has been up ad dana past 24 hrs. Reports possible dc home next week. Objective                            Bed mobility  Supine to Sit: Independent  Sit to Supine: Independent  Transfers  Sit to Stand: Supervision  Stand to sit: Supervision  Ambulation  Surface: level tile  Device:  (holding IV for first 100')  Assistance: Supervision  Quality of Gait: steady gait, appears confident while walking, no loss of balance  Distance: 200'  Comments: min-mod SOB with activity     Balance  Sitting - Static: Good                                                           AM-PAC Score  AM-PAC Inpatient Mobility Raw Score : 20 (06/24/22 1615)  AM-PAC Inpatient T-Scale Score : 47.67 (06/24/22 1615)  Mobility Inpatient CMS 0-100% Score: 35.83 (06/24/22 1615)  Mobility Inpatient CMS G-Code Modifier : CJ (06/24/22 1615)          Goals  Short Term Goals  Time Frame for Short term goals: D/C  Short term goal 1: Pt will report and demonstrate independence with daily HEP- Discontinue 6/22. Revised goal #1:  Demo sitting or standing HEP 10-15 reps indep.   Short term goal 2: Pt will maintain functional independence with transfers and gait throughout hospitalization- Discontinue 6/22. Revised goal #2:  Ambulate 400' independent without holding IV pole & without LOB.   Short term goal 3: Sit<>stand independent  Patient Goals   Patient goals : to go home       Education  Patient Education  Education Given To: Patient  Education Provided: Role of Therapy;Plan of Care  Education Outcome: Verbalized understanding      Therapy Time   Individual Concurrent Group Co-treatment   Time In 1541         Time Out 1610         Minutes 29            Timed Code Treatment Minutes:  29 Minutes    Total Treatment Minutes:  15 [address], [facility]

## 2022-06-24 NOTE — PROGRESS NOTES
[facility] [employer] Progress Note    2022     Anisha Banerjee    MRN: [id number]    : 1970    Referring MD: No referring provider defined for this encounter. SUBJECTIVE: Fever this AM.  No symptoms today or infection and abdominal pain improved.     ECOG PS: (2) Ambulatory and capable of self care, unable to carry out work activity, up and about > 50% or waking hours     KPS: 70% Cares for self; unable to carry on normal activity or to do active work    Isolation: None    Medications    Scheduled Meds:   fluconazole  400 mg IntraVENous Q24H    piperacillin-tazobactam  4,500 mg IntraVENous Q6H    pantoprazole  40 mg IntraVENous Daily    acyclovir  5 mg/kg (Ideal) IntraVENous Q12H    ondansetron  8 mg IntraVENous 3 times per day    filgrastim-aafi  300 mcg SubCUTAneous QPM    sodium chloride flush  5-40 mL IntraVENous 2 times per day    Saline Mouthwash  15 mL Swish & Spit 4x Daily AC & HS    citalopram  20 mg Oral Daily     Continuous Infusions:   IV infusion builder 75 mL/hr at 22 0532    sodium chloride      sodium chloride      sodium chloride      sodium chloride      sodium chloride 20 mL/hr at 06/10/22 1838    sodium chloride       PRN Meds:.acetaminophen, sodium chloride, morphine **OR** morphine, sodium chloride, sodium chloride, potassium phosphate IVPB, calcium carbonate, sodium chloride, diphenhydrAMINE, sodium chloride, sodium chloride flush, sodium chloride, potassium chloride, magnesium sulfate, magnesium hydroxide, Saline Mouthwash, alteplase (CATHFLO) with sterile water injection, prochlorperazine **OR** prochlorperazine, LORazepam **OR** LORazepam, busPIRone, diphenhydrAMINE, hydrOXYzine pamoate    ROS:  As noted above, otherwise remainder of 10-point ROS negative    Physical Exam:     I&O:      Intake/Output Summary (Last 24 hours) at 2022 0831  Last data filed at 2022 0724  Gross per 24 hour   Intake 4195 ml   Output 3150 ml   Net 1045 ml       Vital Signs:  BP 110/66   Pulse (!) 128   Temp 98.9 °F (37.2 °C) (Oral)   Resp 23   Ht 5' 1\" (1.549 m)   Wt 183 lb (83 kg)   SpO2 92%   BMI 34.58 kg/m²     Weight:    Wt Readings from Last 3 Encounters:   06/23/22 183 lb (83 kg)   06/08/22 172 lb 12.8 oz (78.4 kg)   05/27/22 170 lb (77.1 kg)     General: Awake, alert and oriented. HEENT: normocephalic, PERRL, no scleral erythema or icterus, Oral mucosa moist and intact, throat clear  NECK: supple  BACK: Straight   SKIN: warm dry and intact without lesions rashes or masses  CHEST: CTA bilaterally without use of accessory muscles  CV: Normal S1 S2, RRR, no MRG  ABD: soft nontender and not distended, no palpable masses or hepatosplenomegaly  EXTREMITIES: without edema, denies calf tenderness  NEURO: CN II - XII grossly intact  CATHETER: rt upper chest wall heath intact at the insertion site     Data    CBC:   Recent Labs     06/22/22 0415 06/23/22 0335 06/24/22 0313   WBC 0.1* 0.1* 0.6*   HGB 7.4* 7.6* 7.3*   HCT 21.2* 21.7* 21.9*   MCV 95.6 96.2 98.8   PLT 60* 36* 23*     BMP/Mag:  Recent Labs     06/22/22 0415 06/23/22 0335 06/24/22 0313    139 135*   K 3.0* 3.3* 3.3*    109 105   CO2 21 19* 20*   PHOS 2.0* 1.3* 1.7*   BUN 5* 3* <2*   CREATININE <0.5* <0.5* <0.5*   MG  --  1.60*  --      LIVP:   Recent Labs     06/22/22  0415 06/24/22 0313   AST 9* 7*   ALT 19 13   BILIDIR 0.9* 0.4*   BILITOT 1.4* 0.6   ALKPHOS 89 101     Coags:   Recent Labs     06/23/22  0335   PROTIME 18.4*   INR 1.54*   APTT 38.2*     Uric Acid   Recent Labs     06/22/22  0415 06/23/22  0335 06/24/22  0313   LABURIC 0.8* 0.8* 0.7*       PROBLEM LIST:             1. [facility] NHL  2. COVID19 PNA  3. Anxiety / H/o Panic Attacks  4. HLD  5. CAD  6. Chronic systolic HF      TREATMENT:            1. R-CHOP x 6 cycles (2/21/21-6/4/21)  - Imbruvica added with cycle #2  2. R-ICE x 2 cycles 3/28/22-4/19/22  3. BEAM followed by Auto SCT on 6/15/22    ASSESSMENT AND PLAN:           1.  [facility] NHL: R/R, now in CR  - PET/CT 5/4/22: CR  - BM Bx/Asp 5/4/22: OMARI     Auto Day +9     2. ID:   6/21 early AM, likely GI source, Tmax 100.7 this morning  - Cont Diflucan & Acyclovir ppx; cont acyclovir at discharge for VZV positive titer. - Cont Zosyn Day +3 (6/21/22)  - Blood Cxs 6/20 - NG (drawn prior to fever); Repeat Cxs 6/24 - pending  - CXR 6/24: Ill-defined pulmonary opacities in the right and left lower lobe have slightly progressed since the prior study. Sepsis: Hypotension, tachycardia, fever - improved, BP improved  - Recheck lactic acid & procalcitonin - stable  - Cont IVFs at 75mL/hr     3. Heme: Pancytopenia 2/2 chemotherapy  - Transfuse for Hgb < 7 and Platelets < 17F  - No transfusion today  - Cont daily GCSF (8/67/06)     4. Metabolic: HypoNa, HypoK, & HypoPhos, Renal fx stable   - Change IVFs: D5NS w/40KCl & 20mmol KPhos at 75ml/hr   - Replace K+, Mg, & Phos per PRN orders     5. Cardiac: H/o CHF, CAD, HLD  - Cleared for transplant by Dr. Tru Stephen  HLD:   - Does not tolerate statins  - Consider Leqvio after transplant per Dr. Tru Stephen  CAD:   - Angiogram 2019 with borderline lesions but no PCI performed  - ASA STOPPED 6/03   Chronic Systolic HF:   - Echo 2/3/48 w/EF 45-50%  - Stress Test 5/26/22: No evidence of ischemia or scar.  LVEF (73%) & LV wall motion is normal  - HOLD Toprol XL 25mg daily   - Will likely benefit from jardiance - cardiology to assess following BMT     6. Pulmonary:   - Pre-Txp PFTs: FEV1/% / DLCOcor 83%  - Encourage IS & ambulation      7. GI / Nutrition:   Nutrition: Low microbial diet - regular diet  - Diet advanced per surgery  - Follow closely with dietary  Diarrhea:  - C.diff neg 6/18  - D/c imodium with partial SBO   Nausea:   - Cont scheduled zofran  - Compazine PRN  - Cont PPI daily   Partial SBO: terminal ileum 6/21/22  - General surgery consulted3  - NGT removed 6/22/22  - Monitor with serial abd exam/X-rays  - Oxycodone PRN     8. Psych: H/o panic attacked (heart palpitations & chest discomfort) since 1988  - Cont buspar 15mg BID PRN  - Cont celexa 20mg daily     9. MSK:   - PT/OT consulted for prehabilitation / mobility program      - DVT Prophylaxis: Platelets <20,311 cells/dL - prophylactic lovenox on hold and mechanical prophylaxis with bilateral SCDs while in bed in place.   Contraindications to pharmacologic prophylaxis: Thrombocytopenia  Contraindications to mechanical prophylaxis: None      - Disposition:  Once ANC >1 and recovered from toxicities of transplant      ELEAZAR Warren - CNP     Josefina Arango MD  [facility]  Please contact me through [facility]

## 2022-06-24 NOTE — PROGRESS NOTES
Surgery Daily Progress Note      CC: partial SBO    SUBJECTIVE:  Febrile to 100.7F overnight, tachycardia slightly improved to 110s. Tolerating diet, no nausea/vomiting. No abdominal pain. Having BMs.    ROS:   A 14 point review of systems was conducted, significant findings as noted above. All other systems negative.     OBJECTIVE:    PHYSICAL EXAM:  Vitals:    06/24/22 0306 06/24/22 0400 06/24/22 0427 06/24/22 0615   BP: 110/66      Pulse: (!) 130 (!) 128     Resp: 22 23     Temp: 99.6 °F (37.6 °C) 100.1 °F (37.8 °C) (!) 100.7 °F (38.2 °C) 98.9 °F (37.2 °C)   TempSrc: Oral Oral Oral Oral   SpO2: 92%      Weight:       Height:           General appearance: alert, resting in bed, in NAD  HEENT: NCAT  Chest/Lungs: normal effort, no accessory muscle use, on RA  Cardiovascular: tachycardic regular rhythm  Abdomen: soft, minimally distended improved from prior, non-tender, no guarding or rigidity  Extremities: no edema, no cyanosis  Neuro: A&Ox3, no focal deficits, sensation intact      ASSESSMENT & PLAN:   This is a 45 y.o. female with a diagnosis of Diffuse Large B Cell Lymphoma s/p BEAM chemotherapy and autologous stem cell transplant (6/15) with evidence of partial small bowel obstruction    - continue regular diet  - replace electrolytes as indicated to optimize bowel function  - PT/OT  - Further management per primary team      Zhao Bates MD  06/24/22  7:09 AM

## 2022-06-25 LAB
ANION GAP SERPL CALCULATED.3IONS-SCNC: 11 MMOL/L (ref 3–16)
ANISOCYTOSIS: ABNORMAL
BANDED NEUTROPHILS RELATIVE PERCENT: 4 % (ref 0–7)
BASOPHILS ABSOLUTE: 0 K/UL (ref 0–0.2)
BASOPHILS RELATIVE PERCENT: 0 %
BUN BLDV-MCNC: <2 MG/DL (ref 7–20)
CALCIUM SERPL-MCNC: 7.8 MG/DL (ref 8.3–10.6)
CHLORIDE BLD-SCNC: 106 MMOL/L (ref 99–110)
CO2: 22 MMOL/L (ref 21–32)
CREAT SERPL-MCNC: 0.5 MG/DL (ref 0.6–1.1)
EOSINOPHILS ABSOLUTE: 0 K/UL (ref 0–0.6)
EOSINOPHILS RELATIVE PERCENT: 0 %
GFR AFRICAN AMERICAN: >60
GFR NON-AFRICAN AMERICAN: >60
GLUCOSE BLD-MCNC: 98 MG/DL (ref 70–99)
HCT VFR BLD CALC: 22 % (ref 36–48)
HEMOGLOBIN: 7.6 G/DL (ref 12–16)
LYMPHOCYTES ABSOLUTE: 0.2 K/UL (ref 1–5.1)
LYMPHOCYTES RELATIVE PERCENT: 5 %
MACROCYTES: ABNORMAL
MCH RBC QN AUTO: 33.7 PG (ref 26–34)
MCHC RBC AUTO-ENTMCNC: 34.8 G/DL (ref 31–36)
MCV RBC AUTO: 96.8 FL (ref 80–100)
MICROCYTES: ABNORMAL
MONOCYTES ABSOLUTE: 0.5 K/UL (ref 0–1.3)
MONOCYTES RELATIVE PERCENT: 13 %
NEUTROPHILS ABSOLUTE: 2.9 K/UL (ref 1.7–7.7)
NEUTROPHILS RELATIVE PERCENT: 78 %
PDW BLD-RTO: 17.4 % (ref 12.4–15.4)
PHOSPHORUS: 2.7 MG/DL (ref 2.5–4.9)
PLATELET # BLD: 15 K/UL (ref 135–450)
PLATELET SLIDE REVIEW: ABNORMAL
PMV BLD AUTO: 8.2 FL (ref 5–10.5)
POLYCHROMASIA: ABNORMAL
POTASSIUM SERPL-SCNC: 3.9 MMOL/L (ref 3.5–5.1)
RBC # BLD: 2.27 M/UL (ref 4–5.2)
SCHISTOCYTES: ABNORMAL
SLIDE REVIEW: ABNORMAL
SODIUM BLD-SCNC: 139 MMOL/L (ref 136–145)
TEAR DROP CELLS: ABNORMAL
URIC ACID, SERUM: 0.8 MG/DL (ref 2.6–6)
URINE CULTURE, ROUTINE: NORMAL
URINE CULTURE, ROUTINE: NORMAL
WBC # BLD: 3.5 K/UL (ref 4–11)

## 2022-06-25 PROCEDURE — 99231 SBSQ HOSP IP/OBS SF/LOW 25: CPT | Performed by: SURGERY

## 2022-06-25 PROCEDURE — 6370000000 HC RX 637 (ALT 250 FOR IP): Performed by: NURSE PRACTITIONER

## 2022-06-25 PROCEDURE — 36592 COLLECT BLOOD FROM PICC: CPT

## 2022-06-25 PROCEDURE — 2580000003 HC RX 258: Performed by: NURSE PRACTITIONER

## 2022-06-25 PROCEDURE — 80048 BASIC METABOLIC PNL TOTAL CA: CPT

## 2022-06-25 PROCEDURE — 85025 COMPLETE CBC W/AUTO DIFF WBC: CPT

## 2022-06-25 PROCEDURE — 84100 ASSAY OF PHOSPHORUS: CPT

## 2022-06-25 PROCEDURE — 2060000000 HC ICU INTERMEDIATE R&B

## 2022-06-25 PROCEDURE — 2500000003 HC RX 250 WO HCPCS: Performed by: NURSE PRACTITIONER

## 2022-06-25 PROCEDURE — 84550 ASSAY OF BLOOD/URIC ACID: CPT

## 2022-06-25 PROCEDURE — 6360000002 HC RX W HCPCS: Performed by: NURSE PRACTITIONER

## 2022-06-25 RX ADMIN — DIPHENHYDRAMINE HYDROCHLORIDE 25 MG: 25 TABLET ORAL at 03:42

## 2022-06-25 RX ADMIN — METOPROLOL TARTRATE 12.5 MG: 25 TABLET, FILM COATED ORAL at 09:35

## 2022-06-25 RX ADMIN — ONDANSETRON 8 MG: 2 INJECTION INTRAMUSCULAR; INTRAVENOUS at 06:42

## 2022-06-25 RX ADMIN — SODIUM CHLORIDE 15 ML: 900 IRRIGANT IRRIGATION at 06:43

## 2022-06-25 RX ADMIN — PIPERACILLIN AND TAZOBACTAM 4500 MG: 4; .5 INJECTION, POWDER, LYOPHILIZED, FOR SOLUTION INTRAVENOUS at 03:42

## 2022-06-25 RX ADMIN — VALACYCLOVIR HYDROCHLORIDE 500 MG: 500 TABLET, FILM COATED ORAL at 09:35

## 2022-06-25 RX ADMIN — DIPHENHYDRAMINE HYDROCHLORIDE 25 MG: 25 TABLET ORAL at 18:13

## 2022-06-25 RX ADMIN — ONDANSETRON 8 MG: 2 INJECTION INTRAMUSCULAR; INTRAVENOUS at 18:12

## 2022-06-25 RX ADMIN — SODIUM CHLORIDE, PRESERVATIVE FREE 10 ML: 5 INJECTION INTRAVENOUS at 20:12

## 2022-06-25 RX ADMIN — PANTOPRAZOLE SODIUM 40 MG: 40 TABLET, DELAYED RELEASE ORAL at 06:43

## 2022-06-25 RX ADMIN — METOPROLOL TARTRATE 12.5 MG: 25 TABLET, FILM COATED ORAL at 20:12

## 2022-06-25 RX ADMIN — POTASSIUM CHLORIDE: 2 INJECTION, SOLUTION, CONCENTRATE INTRAVENOUS at 18:44

## 2022-06-25 RX ADMIN — CITALOPRAM HYDROBROMIDE 20 MG: 20 TABLET ORAL at 20:13

## 2022-06-25 RX ADMIN — ONDANSETRON 8 MG: 2 INJECTION INTRAMUSCULAR; INTRAVENOUS at 22:42

## 2022-06-25 RX ADMIN — VALACYCLOVIR HYDROCHLORIDE 500 MG: 500 TABLET, FILM COATED ORAL at 20:12

## 2022-06-25 RX ADMIN — BUSPIRONE HYDROCHLORIDE 15 MG: 5 TABLET ORAL at 18:13

## 2022-06-25 RX ADMIN — POTASSIUM CHLORIDE: 2 INJECTION, SOLUTION, CONCENTRATE INTRAVENOUS at 03:24

## 2022-06-25 ASSESSMENT — PAIN SCALES - WONG BAKER
WONGBAKER_NUMERICALRESPONSE: 0

## 2022-06-25 ASSESSMENT — PAIN SCALES - GENERAL: PAINLEVEL_OUTOF10: 0

## 2022-06-25 NOTE — PLAN OF CARE
Problem: Safety - Adult  Goal: Free from fall injury  Outcome: Progressing  Note: Pt up ad dana with steady gait and reports feeling generally stronger today. Pt calls out appropriately. No falls to note at this time. Will continue to monitor. Problem: Pain  Goal: Verbalizes/displays adequate comfort level or baseline comfort level  Outcome: Progressing  Note: No reports of pain at this time. Will continue to monitor. Problem: Infection - Adult  Goal: Absence of infection during hospitalization  Outcome: Progressing  Note: CVC site remains free of signs/symptoms of infection. No drainage, edema, erythema, pain, or warmth noted at site. Dressing changes continue per protocol and on an as needed basis - see flowsheet. Compliant with [facility] Bath Protocol:  Performed CHG bath today per [facility] protocol utilizing CHG solution in the shower. CVC site cleansed with CHG wipe over dressing, skin surrounding dressing, and first 6\" of IV tubing. Pt tolerated well. Continued to encourage daily CHG bathing per [facility] protocol.

## 2022-06-25 NOTE — PROGRESS NOTES
Surgery Daily Progress Note      CC: partial SBO    SUBJECTIVE:   Afebrile but persistently tachycardic overnight. Did not take in much PO yesterday because of lack of appetite. Reports abdominal pain and distention are improved. Denies nausea or vomiting. Continues to have bowel movements. ROS:   A 14 point review of systems was conducted, significant findings as noted above. All other systems negative. OBJECTIVE:    PHYSICAL EXAM:  Vitals:    06/24/22 1545 06/24/22 2041 06/25/22 0011 06/25/22 0336   BP: 112/82 114/69 111/61 109/64   Pulse: (!) 122 (!) 135 (!) 117 (!) 105   Resp: 27 22 25 21   Temp: 98.4 °F (36.9 °C) 97.9 °F (36.6 °C) 98.5 °F (36.9 °C) 97.3 °F (36.3 °C)   TempSrc: Oral Oral Oral Oral   SpO2: 97% 95% 92% 95%   Weight:       Height:           General appearance: alert, resting in bed, in NAD  HEENT: NCAT  Chest/Lungs: normal effort, no accessory muscle use, on RA  Cardiovascular: tachycardic regular rhythm  Abdomen: soft, minimally distended improved from prior, non-tender, no guarding or rigidity  Extremities: no edema, no cyanosis  Neuro: A&Ox3, no focal deficits, sensation intact      ASSESSMENT & PLAN:   This is a 45 y.o. female with a diagnosis of Diffuse Large B Cell Lymphoma s/p BEAM chemotherapy and autologous stem cell transplant (6/15) with evidence of partial small bowel obstruction    - continue regular diet, patient educated to self-regulate  - replace electrolytes as indicated to optimize bowel function  - PT/OT  - Further management per primary team    Britany Block DO, MS  PGY1, General Surgery  06/25/22  5:49 AM    I am post-call today and will not be on campus. Please contact Dr. Enio Reid at [phone] for questions or concerns regarding this patient.

## 2022-06-25 NOTE — PROGRESS NOTES
[facility] Progress Note    2022     Frida Brown    MRN: [id number]    : 1970    Referring MD: No referring provider defined for this encounter. SUBJECTIVE: Up and eating breakfast. Over 3L stool overnight, patient states they were loose to liquid. Nausea controlled with scheduled zofran.     ECOG PS: (2) Ambulatory and capable of self care, unable to carry out work activity, up and about > 50% or waking hours     KPS: 70% Cares for self; unable to carry on normal activity or to do active work    Isolation: None    Medications    Scheduled Meds:   valACYclovir  500 mg Oral BID    fluconazole  400 mg Oral Daily    metoprolol tartrate  12.5 mg Oral BID    pantoprazole  40 mg Oral QAM AC    piperacillin-tazobactam  4,500 mg IntraVENous Q6H    ondansetron  8 mg IntraVENous 3 times per day    filgrastim-aafi  300 mcg SubCUTAneous QPM    sodium chloride flush  5-40 mL IntraVENous 2 times per day    Saline Mouthwash  15 mL Swish & Spit 4x Daily AC & HS    citalopram  20 mg Oral Daily     Continuous Infusions:   IV infusion builder 75 mL/hr at 22 0324    sodium chloride      sodium chloride      sodium chloride      sodium chloride 20 mL/hr at 06/10/22 1838    sodium chloride       PRN Meds:.oxyCODONE, acetaminophen, sodium chloride, sodium chloride, potassium phosphate IVPB, calcium carbonate, sodium chloride, diphenhydrAMINE, sodium chloride, sodium chloride flush, sodium chloride, potassium chloride, magnesium sulfate, magnesium hydroxide, Saline Mouthwash, alteplase (CATHFLO) with sterile water injection, prochlorperazine **OR** prochlorperazine, LORazepam **OR** LORazepam, busPIRone, diphenhydrAMINE, hydrOXYzine pamoate    ROS:  As noted above, otherwise remainder of 10-point ROS negative    Physical Exam:     I&O:      Intake/Output Summary (Last 24 hours) at 2022 0640  Last data filed at 2022 0347  Gross per 24 hour   Intake 2200 ml   Output 5800 ml   Net -3600 ml Vital Signs:  /64   Pulse (!) 105   Temp 97.3 °F (36.3 °C) (Oral)   Resp 21   Ht 5' 1\" (1.549 m)   Wt 187 lb (84.8 kg)   SpO2 95%   BMI 35.33 kg/m²     Weight:    Wt Readings from Last 3 Encounters:   06/24/22 187 lb (84.8 kg)   06/08/22 172 lb 12.8 oz (78.4 kg)   05/27/22 170 lb (77.1 kg)     General: Awake, alert and oriented. HEENT: normocephalic, PERRL, no scleral erythema or icterus, Oral mucosa moist and intact, throat clear  NECK: supple  BACK: Straight   SKIN: warm dry and intact without lesions rashes or masses  CHEST: CTA bilaterally without use of accessory muscles  CV: Normal S1 S2, RRR, no MRG  ABD: soft nontender and not distended, no palpable masses or hepatosplenomegaly  EXTREMITIES: without edema, denies calf tenderness  NEURO: CN II - XII grossly intact  CATHETER: rt upper chest wall heath intact at the insertion site     Data    CBC:   Recent Labs     06/23/22  0335 06/24/22 0313 06/25/22  0355   WBC 0.1* 0.6* 3.5*   HGB 7.6* 7.3* 7.6*   HCT 21.7* 21.9* 22.0*   MCV 96.2 98.8 96.8   PLT 36* 23* 15*     BMP/Mag:  Recent Labs     06/23/22  0335 06/24/22  0313 06/24/22  0314 06/25/22  0355    135*  --  139   K 3.3* 3.3*  --  3.9    105  --  106   CO2 19* 20*  --  22   PHOS 1.3* 1.7*  --  2.7   BUN 3* <2*  --  <2*   CREATININE <0.5* <0.5*  --  0.5*   MG 1.60*  --  1.40*  --      LIVP:   Recent Labs     06/24/22 0313   AST 7*   ALT 13   BILIDIR 0.4*   BILITOT 0.6   ALKPHOS 101     Coags:   Recent Labs     06/23/22 0335   PROTIME 18.4*   INR 1.54*   APTT 38.2*     Uric Acid   Recent Labs     06/23/22  0335 06/24/22  0313 06/25/22  0355   LABURIC 0.8* 0.7* 0.8*       PROBLEM LIST:             1. DLBC NHL  2. COVID19 PNA  3. Anxiety / H/o Panic Attacks  4. HLD  5. CAD  6. Chronic systolic HF      TREATMENT:            1. R-CHOP x 6 cycles (2/21/21-6/4/21)  - Imbruvica added with cycle #2  2. R-ICE x 2 cycles 3/28/22-4/19/22  3.  BEAM followed by Auto SCT on 6/15/22    ASSESSMENT AND PLAN:           1. [facility] NHL: R/R, now in CR  - PET/CT 5/4/22: CR  - BM Bx/Asp 5/4/22: OMARI     Auto Day +10     2. ID:   6/21 early AM, likely GI source, Afebrile  - Cont Acyclovir ppx; cont acyclovir at discharge for VZV positive titer. - Cont Zosyn Day +4 (6/21/22). Stop 6/25/22  - Blood Cxs 6/20 - NG (drawn prior to fever); Repeat Cxs 6/24 - NGTD  - CXR 6/24: Ill-defined pulmonary opacities in the right and left lower lobe have slightly progressed since the prior study. Sepsis: Hypotension, tachycardia, fever - improved, BP improved  - Recheck lactic acid & procalcitonin - stable  - Cont IVFs at 75mL/hr     3. Heme: Pancytopenia 2/2 chemotherapy  - Transfuse for Hgb < 7 and Platelets < 18B  - No transfusion today  - Cont daily GCSF (6/11/37)     4. Metabolic: HypoNa, HypoK, & HypoPhos, Renal fx stable   - Change IVFs: D5NS w/40KCl & 20mmol KPhos at 75ml/hr   - Replace K+, Mg, & Phos per PRN orders     5. Cardiac: H/o CHF, CAD, HLD  - Cleared for transplant by Dr. Donte Benito  HLD:   - Does not tolerate statins  - Consider Leqvio after transplant per Dr. Donte Benito  CAD:   - Angiogram 2019 with borderline lesions but no PCI performed  - ASA STOPPED 5/64   Chronic Systolic HF:   - Echo 6/4/63 w/EF 45-50%  - Stress Test 5/26/22: No evidence of ischemia or scar.  LVEF (73%) & LV wall motion is normal  - HOLD Toprol XL 25mg daily   - Will likely benefit from jardiance - cardiology to assess following BMT     6. Pulmonary:   - Pre-Txp PFTs: FEV1/% / DLCOcor 83%  - Encourage IS & ambulation      7. GI / Nutrition:   Nutrition: Low microbial diet - regular diet  - Diet advanced per surgery  - Follow closely with dietary  Diarrhea:  - C.diff neg 6/18  - D/c imodium with partial SBO   - 3+ liters stool 6/24-25   Nausea:   - Cont scheduled zofran  - Compazine PRN  - Cont PPI daily   Partial SBO: terminal ileum 6/21/22  - General surgery consulted3  - NGT removed 6/22/22  - Monitor with serial abd exam/X-rays  - Oxycodone PRN     8. Psych: H/o panic attacked (heart palpitations & chest discomfort) since 1988  - Cont buspar 15mg BID PRN  - Cont celexa 20mg daily     9. MSK:   - PT/OT consulted for prehabilitation / mobility program      - DVT Prophylaxis: Platelets <72,385 cells/dL - prophylactic lovenox on hold and mechanical prophylaxis with bilateral SCDs while in bed in place.   Contraindications to pharmacologic prophylaxis: Thrombocytopenia  Contraindications to mechanical prophylaxis: None      - Disposition:  Once ANC >1 and recovered from toxicities of transplant    Calista Garrett MD  [facility]  Please contact me through [facility]

## 2022-06-26 LAB
ABO/RH: NORMAL
ALBUMIN SERPL-MCNC: 2.9 G/DL (ref 3.4–5)
ANION GAP SERPL CALCULATED.3IONS-SCNC: 12 MMOL/L (ref 3–16)
ANTIBODY SCREEN: NORMAL
BANDED NEUTROPHILS RELATIVE PERCENT: 2 % (ref 0–7)
BASOPHILS ABSOLUTE: 0 K/UL (ref 0–0.2)
BASOPHILS RELATIVE PERCENT: 0 %
BUN BLDV-MCNC: <2 MG/DL (ref 7–20)
CALCIUM SERPL-MCNC: 8.2 MG/DL (ref 8.3–10.6)
CHLORIDE BLD-SCNC: 108 MMOL/L (ref 99–110)
CO2: 19 MMOL/L (ref 21–32)
CREAT SERPL-MCNC: 0.6 MG/DL (ref 0.6–1.1)
EOSINOPHILS ABSOLUTE: 0 K/UL (ref 0–0.6)
EOSINOPHILS RELATIVE PERCENT: 0 %
FINAL REPORT: NORMAL
GFR AFRICAN AMERICAN: >60
GFR NON-AFRICAN AMERICAN: >60
GLUCOSE BLD-MCNC: 95 MG/DL (ref 70–99)
HCT VFR BLD CALC: 23.3 % (ref 36–48)
HEMOGLOBIN: 7.9 G/DL (ref 12–16)
LYMPHOCYTES ABSOLUTE: 0.2 K/UL (ref 1–5.1)
LYMPHOCYTES RELATIVE PERCENT: 6 %
MAGNESIUM: 1.8 MG/DL (ref 1.8–2.4)
MCH RBC QN AUTO: 33.1 PG (ref 26–34)
MCHC RBC AUTO-ENTMCNC: 33.8 G/DL (ref 31–36)
MCV RBC AUTO: 97.8 FL (ref 80–100)
MONOCYTES ABSOLUTE: 0.5 K/UL (ref 0–1.3)
MONOCYTES RELATIVE PERCENT: 14 %
NEUTROPHILS ABSOLUTE: 3.1 K/UL (ref 1.7–7.7)
NEUTROPHILS RELATIVE PERCENT: 78 %
ORGANISM: ABNORMAL
PDW BLD-RTO: 17.2 % (ref 12.4–15.4)
PHOSPHORUS: 2.9 MG/DL (ref 2.5–4.9)
PLATELET # BLD: 17 K/UL (ref 135–450)
PLATELET SLIDE REVIEW: ABNORMAL
PMV BLD AUTO: 8.9 FL (ref 5–10.5)
POTASSIUM SERPL-SCNC: 3.6 MMOL/L (ref 3.5–5.1)
PRELIMINARY: NORMAL
RBC # BLD: 2.38 M/UL (ref 4–5.2)
RBC # BLD: NORMAL 10*6/UL
SLIDE REVIEW: ABNORMAL
SODIUM BLD-SCNC: 139 MMOL/L (ref 136–145)
THROAT CULTURE: ABNORMAL
THROAT CULTURE: ABNORMAL
URIC ACID, SERUM: 1.2 MG/DL (ref 2.6–6)
WBC # BLD: 3.9 K/UL (ref 4–11)

## 2022-06-26 PROCEDURE — 6370000000 HC RX 637 (ALT 250 FOR IP): Performed by: NURSE PRACTITIONER

## 2022-06-26 PROCEDURE — 86900 BLOOD TYPING SEROLOGIC ABO: CPT

## 2022-06-26 PROCEDURE — 99231 SBSQ HOSP IP/OBS SF/LOW 25: CPT | Performed by: SURGERY

## 2022-06-26 PROCEDURE — 86850 RBC ANTIBODY SCREEN: CPT

## 2022-06-26 PROCEDURE — 2500000003 HC RX 250 WO HCPCS: Performed by: NURSE PRACTITIONER

## 2022-06-26 PROCEDURE — 86901 BLOOD TYPING SEROLOGIC RH(D): CPT

## 2022-06-26 PROCEDURE — 36592 COLLECT BLOOD FROM PICC: CPT

## 2022-06-26 PROCEDURE — 6360000002 HC RX W HCPCS: Performed by: NURSE PRACTITIONER

## 2022-06-26 PROCEDURE — 85025 COMPLETE CBC W/AUTO DIFF WBC: CPT

## 2022-06-26 PROCEDURE — 2580000003 HC RX 258: Performed by: NURSE PRACTITIONER

## 2022-06-26 PROCEDURE — 84550 ASSAY OF BLOOD/URIC ACID: CPT

## 2022-06-26 PROCEDURE — 80069 RENAL FUNCTION PANEL: CPT

## 2022-06-26 PROCEDURE — 2060000000 HC ICU INTERMEDIATE R&B

## 2022-06-26 PROCEDURE — 83735 ASSAY OF MAGNESIUM: CPT

## 2022-06-26 RX ORDER — ONDANSETRON HYDROCHLORIDE 8 MG/1
8 TABLET, FILM COATED ORAL EVERY 8 HOURS PRN
Status: DISCONTINUED | OUTPATIENT
Start: 2022-06-26 | End: 2022-06-27 | Stop reason: HOSPADM

## 2022-06-26 RX ADMIN — PANTOPRAZOLE SODIUM 40 MG: 40 TABLET, DELAYED RELEASE ORAL at 06:06

## 2022-06-26 RX ADMIN — VALACYCLOVIR HYDROCHLORIDE 500 MG: 500 TABLET, FILM COATED ORAL at 09:08

## 2022-06-26 RX ADMIN — DIPHENHYDRAMINE HYDROCHLORIDE 25 MG: 25 TABLET ORAL at 19:47

## 2022-06-26 RX ADMIN — SODIUM CHLORIDE, PRESERVATIVE FREE 10 ML: 5 INJECTION INTRAVENOUS at 09:09

## 2022-06-26 RX ADMIN — SODIUM CHLORIDE, PRESERVATIVE FREE 10 ML: 5 INJECTION INTRAVENOUS at 19:47

## 2022-06-26 RX ADMIN — BUSPIRONE HYDROCHLORIDE 15 MG: 5 TABLET ORAL at 19:46

## 2022-06-26 RX ADMIN — CITALOPRAM HYDROBROMIDE 20 MG: 20 TABLET ORAL at 19:47

## 2022-06-26 RX ADMIN — POTASSIUM CHLORIDE: 2 INJECTION, SOLUTION, CONCENTRATE INTRAVENOUS at 06:05

## 2022-06-26 RX ADMIN — METOPROLOL TARTRATE 12.5 MG: 25 TABLET, FILM COATED ORAL at 19:47

## 2022-06-26 RX ADMIN — ONDANSETRON 8 MG: 2 INJECTION INTRAMUSCULAR; INTRAVENOUS at 06:06

## 2022-06-26 RX ADMIN — VALACYCLOVIR HYDROCHLORIDE 500 MG: 500 TABLET, FILM COATED ORAL at 19:47

## 2022-06-26 RX ADMIN — METOPROLOL TARTRATE 12.5 MG: 25 TABLET, FILM COATED ORAL at 09:08

## 2022-06-26 ASSESSMENT — PAIN SCALES - WONG BAKER
WONGBAKER_NUMERICALRESPONSE: 0

## 2022-06-26 NOTE — PLAN OF CARE
Problem: Safety - Adult  Goal: Free from fall injury  6/26/2022 0357 by Jose Carlos Begum RN  Outcome: Progressing  Note: Orthostatic vital signs obtained at start of shift - see flowsheet for details. Pt does not meet criteria for orthostasis. Pt is a Med fall risk. See [place] Norma Fall Score and ABCDS Injury Risk assessments. - Screening for Orthostasis AND not a [place] Risk per ROSALES/ABCDS: Pt bed is in low position, side rails up, call light and belongings are in reach. Fall risk light is on outside pts room. Pt encouraged to call for assistance as needed. Will continue with hourly rounds for PO intake, pain needs, toileting and repositioning as needed. Problem: Pain  Goal: Verbalizes/displays adequate comfort level or baseline comfort level  6/26/2022 0357 by Jose Carlos Begum RN  Outcome: Progressing  Flowsheets (Taken 6/26/2022 0357)  Verbalizes/displays adequate comfort level or baseline comfort level:   Encourage patient to monitor pain and request assistance   Assess pain using appropriate pain scale   Implement non-pharmacological measures as appropriate and evaluate response     Problem: Hematologic - Adult  Goal: Maintains hematologic stability  Outcome: Progressing  Flowsheets (Taken 6/26/2022 0357)  Maintains hematologic stability:   Assess for signs and symptoms of bleeding or hemorrhage   Administer blood products/factors as ordered   Monitor labs for bleeding or clotting disorders  Note: Patient's hemoglobin this AM:   Recent Labs     06/26/22  0325   HGB 7.9*     Patient's platelet count this AM:   Recent Labs     06/26/22  0325   PLT 17*    Thrombocytopenia Precautions in place. Patient showing no signs or symptoms of active bleeding. Transfusion not indicated at this time. Patient verbalizes understanding of all instructions. Will continue to assess and implement POC. Call light within reach and hourly rounding in place.

## 2022-06-26 NOTE — PROGRESS NOTES
[facility] [employer] Progress Note    2022     Denton Luis    MRN: [id number]    : 1970    Referring MD: No referring provider defined for this encounter. SUBJECTIVE: Awake and feeling well. Decreased stool output. Eating well and ready to go home.     ECOG PS: (2) Ambulatory and capable of self care, unable to carry out work activity, up and about > 50% or waking hours     KPS: 70% Cares for self; unable to carry on normal activity or to do active work    Isolation: None    Medications    Scheduled Meds:   valACYclovir  500 mg Oral BID    metoprolol tartrate  12.5 mg Oral BID    pantoprazole  40 mg Oral QAM AC    ondansetron  8 mg IntraVENous 3 times per day    sodium chloride flush  5-40 mL IntraVENous 2 times per day    Saline Mouthwash  15 mL Swish & Spit 4x Daily AC & HS    citalopram  20 mg Oral Daily     Continuous Infusions:   IV infusion builder 75 mL/hr at 22 0605    sodium chloride      sodium chloride      sodium chloride      sodium chloride 20 mL/hr at 06/10/22 1838    sodium chloride       PRN Meds:.oxyCODONE, acetaminophen, sodium chloride, sodium chloride, potassium phosphate IVPB, calcium carbonate, sodium chloride, diphenhydrAMINE, sodium chloride, sodium chloride flush, sodium chloride, potassium chloride, magnesium sulfate, magnesium hydroxide, Saline Mouthwash, alteplase (CATHFLO) with sterile water injection, prochlorperazine **OR** prochlorperazine, LORazepam **OR** LORazepam, busPIRone, diphenhydrAMINE, hydrOXYzine pamoate    ROS:  As noted above, otherwise remainder of 10-point ROS negative    Physical Exam:     I&O:      Intake/Output Summary (Last 24 hours) at 2022 0656  Last data filed at 2022 3315  Gross per 24 hour   Intake 720 ml   Output 3000 ml   Net -2280 ml       Vital Signs:  /78   Pulse (!) 115   Temp 97.8 °F (36.6 °C) (Oral)   Resp 18   Ht 5' 1\" (1.549 m)   Wt 182 lb 6.4 oz (82.7 kg)   SpO2 96%   BMI 34.46 kg/m²     Weight: positive titer. - Blood Cxs 6/20 - NG (drawn prior to fever); Repeat Cxs 6/24 - NGTD  - CXR 6/24: Ill-defined pulmonary opacities in the right and left lower lobe have slightly progressed since the prior study. Sepsis: Hypotension, tachycardia, fever - improved, BP improved  - Recheck lactic acid & procalcitonin - stable  - Cont IVFs at 75mL/hr stop    Antibiotic Hx:  Zosyn 6/21-6/25/22     3. Heme: Pancytopenia 2/2 chemotherapy  - Transfuse for Hgb < 7 and Platelets < 38S  - No transfusion today  - Cont daily GCSF (6/20/22), d/c 6/25/22     4. Metabolic: HypoNa, HypoK, & HypoPhos, Renal fx stable   - Change IVFs: D5NS w/40KCl & 20mmol KPhos at 75ml/hr d/c 6/26/22, stop 6/26/22  - Replace K+, Mg, & Phos per PRN orders     5. Cardiac: H/o CHF, CAD, HLD  - Cleared for transplant by Dr. Nomi Rico  HLD:   - Does not tolerate statins  - Consider Leqvio after transplant per Dr. Nomi Rico  CAD:   - Angiogram 2019 with borderline lesions but no PCI performed  - ASA STOPPED 1/50   Chronic Systolic HF:   - Echo 6/1/69 w/EF 45-50%  - Stress Test 5/26/22: No evidence of ischemia or scar. LVEF (73%) & LV wall motion is normal  - HOLD Toprol XL 25mg daily   - Will likely benefit from jardiance - cardiology to assess following BMT     6. Pulmonary:   - Pre-Txp PFTs: FEV1/% / DLCOcor 83%  - Encourage IS & ambulation      7. GI / Nutrition:   Nutrition: Low microbial diet - regular diet  - Diet advanced per surgery  - Follow closely with dietary  Diarrhea:  - C.diff neg 6/18  - D/c imodium with partial SBO   - 3+ liters stool 6/24-25, improved 6/26/22  Nausea:   - Cont scheduled zofran - stop 6/26/22  - Compazine PRN  - Cont PPI daily   Partial SBO: terminal ileum 6/21/22  - General surgery consulted3  - NGT removed 6/22/22  - Monitor with serial abd exam/X-rays  - Oxycodone PRN     8. Psych: H/o panic attacked (heart palpitations & chest discomfort) since 1988  - Cont buspar 15mg BID PRN  - Cont celexa 20mg daily     9.  MSK:   - PT/OT consulted for prehabilitation / mobility program      - DVT Prophylaxis: Platelets <69,548 cells/dL - prophylactic lovenox on hold and mechanical prophylaxis with bilateral SCDs while in bed in place.   Contraindications to pharmacologic prophylaxis: Thrombocytopenia  Contraindications to mechanical prophylaxis: None      - Disposition:  Once ANC >1 and recovered from toxicities of transplant and lodging acquired      Praveen Morales MD  [facility]  Please contact me through [address]

## 2022-06-26 NOTE — PLAN OF CARE
Problem: Safety - Adult  Goal: Free from fall injury  Outcome: Progressing  Note: Pt up ad dana with steady gait and reports feeling generally stronger today. Pt calls out appropriately. No falls to note at this time. Will continue to monitor. Problem: Pain  Goal: Verbalizes/displays adequate comfort level or baseline comfort level  Outcome: Progressing  Note: No reports of pain at this time. Will continue to monitor. Problem: Infection - Adult  Goal: Absence of infection during hospitalization  Outcome: Progressing  Note: CVC site remains free of signs/symptoms of infection. No drainage, edema, erythema, pain, or warmth noted at site. Dressing changes continue per protocol and on an as needed basis - see flowsheet. Compliant with BCC Bath Protocol:  Performed CHG bath today per BCC protocol utilizing CHG solution in the shower. CVC site cleansed with CHG wipe over dressing, skin surrounding dressing, and first 6\" of IV tubing. Pt tolerated well. Continued to encourage daily CHG bathing per [address] protocol.

## 2022-06-27 ENCOUNTER — APPOINTMENT (OUTPATIENT)
Dept: GENERAL RADIOLOGY | Age: 52
DRG: 016 | End: 2022-06-27
Attending: INTERNAL MEDICINE
Payer: COMMERCIAL

## 2022-06-27 VITALS
WEIGHT: 171.2 LBS | HEIGHT: 61 IN | DIASTOLIC BLOOD PRESSURE: 69 MMHG | RESPIRATION RATE: 18 BRPM | TEMPERATURE: 97.8 F | OXYGEN SATURATION: 94 % | SYSTOLIC BLOOD PRESSURE: 102 MMHG | HEART RATE: 92 BPM | BODY MASS INDEX: 32.32 KG/M2

## 2022-06-27 LAB
ACANTHOCYTES: ABNORMAL
ALBUMIN SERPL-MCNC: 3.1 G/DL (ref 3.4–5)
ALP BLD-CCNC: 209 U/L (ref 40–129)
ALT SERPL-CCNC: 35 U/L (ref 10–40)
ANION GAP SERPL CALCULATED.3IONS-SCNC: 11 MMOL/L (ref 3–16)
ANISOCYTOSIS: ABNORMAL
APTT: 28.5 SEC (ref 23–34.3)
AST SERPL-CCNC: 38 U/L (ref 15–37)
BACTERIA: ABNORMAL /HPF
BANDED NEUTROPHILS RELATIVE PERCENT: 10 % (ref 0–7)
BASOPHILS ABSOLUTE: 0 K/UL (ref 0–0.2)
BASOPHILS RELATIVE PERCENT: 0 %
BILIRUB SERPL-MCNC: 0.4 MG/DL (ref 0–1)
BILIRUBIN DIRECT: 0.3 MG/DL (ref 0–0.3)
BILIRUBIN URINE: NEGATIVE
BILIRUBIN, INDIRECT: 0.1 MG/DL (ref 0–1)
BLOOD, URINE: ABNORMAL
BUN BLDV-MCNC: 3 MG/DL (ref 7–20)
CALCIUM SERPL-MCNC: 8.5 MG/DL (ref 8.3–10.6)
CHLORIDE BLD-SCNC: 106 MMOL/L (ref 99–110)
CLARITY: CLEAR
CO2: 22 MMOL/L (ref 21–32)
COLOR: YELLOW
CREAT SERPL-MCNC: 0.5 MG/DL (ref 0.6–1.1)
EOSINOPHILS ABSOLUTE: 0 K/UL (ref 0–0.6)
EOSINOPHILS RELATIVE PERCENT: 0 %
GFR AFRICAN AMERICAN: >60
GFR NON-AFRICAN AMERICAN: >60
GLUCOSE BLD-MCNC: 99 MG/DL (ref 70–99)
GLUCOSE URINE: NEGATIVE MG/DL
HCT VFR BLD CALC: 24.1 % (ref 36–48)
HEMOGLOBIN: 8.2 G/DL (ref 12–16)
INR BLD: 1.26 (ref 0.87–1.14)
KETONES, URINE: NEGATIVE MG/DL
LACTATE DEHYDROGENASE: 158 U/L (ref 100–190)
LEUKOCYTE ESTERASE, URINE: NEGATIVE
LYMPHOCYTES ABSOLUTE: 0.3 K/UL (ref 1–5.1)
LYMPHOCYTES RELATIVE PERCENT: 10 %
MACROCYTES: ABNORMAL
MAGNESIUM: 1.7 MG/DL (ref 1.8–2.4)
MCH RBC QN AUTO: 33.2 PG (ref 26–34)
MCHC RBC AUTO-ENTMCNC: 34 G/DL (ref 31–36)
MCV RBC AUTO: 97.5 FL (ref 80–100)
MICROCYTES: ABNORMAL
MICROSCOPIC EXAMINATION: YES
MONOCYTES ABSOLUTE: 0.4 K/UL (ref 0–1.3)
MONOCYTES RELATIVE PERCENT: 14 %
NEUTROPHILS ABSOLUTE: 2 K/UL (ref 1.7–7.7)
NEUTROPHILS RELATIVE PERCENT: 66 %
NITRITE, URINE: NEGATIVE
PDW BLD-RTO: 16.7 % (ref 12.4–15.4)
PH UA: 6 (ref 5–8)
PHOSPHORUS: 3.2 MG/DL (ref 2.5–4.9)
PLATELET # BLD: 20 K/UL (ref 135–450)
PLATELET SLIDE REVIEW: ABNORMAL
PMV BLD AUTO: 8.5 FL (ref 5–10.5)
POTASSIUM SERPL-SCNC: 3.6 MMOL/L (ref 3.5–5.1)
PROTEIN UA: NEGATIVE MG/DL
PROTHROMBIN TIME: 15.7 SEC (ref 11.7–14.5)
RBC # BLD: 2.47 M/UL (ref 4–5.2)
RBC UA: ABNORMAL /HPF (ref 0–4)
SLIDE REVIEW: ABNORMAL
SODIUM BLD-SCNC: 139 MMOL/L (ref 136–145)
SPECIFIC GRAVITY UA: 1.01 (ref 1–1.03)
TEAR DROP CELLS: ABNORMAL
TOTAL PROTEIN: 5.1 G/DL (ref 6.4–8.2)
URIC ACID, SERUM: 1.5 MG/DL (ref 2.6–6)
URINE TYPE: ABNORMAL
UROBILINOGEN, URINE: 0.2 E.U./DL
WBC # BLD: 2.6 K/UL (ref 4–11)
WBC UA: ABNORMAL /HPF (ref 0–5)

## 2022-06-27 PROCEDURE — 80048 BASIC METABOLIC PNL TOTAL CA: CPT

## 2022-06-27 PROCEDURE — 6370000000 HC RX 637 (ALT 250 FOR IP): Performed by: NURSE PRACTITIONER

## 2022-06-27 PROCEDURE — 84100 ASSAY OF PHOSPHORUS: CPT

## 2022-06-27 PROCEDURE — 80076 HEPATIC FUNCTION PANEL: CPT

## 2022-06-27 PROCEDURE — 84550 ASSAY OF BLOOD/URIC ACID: CPT

## 2022-06-27 PROCEDURE — 81001 URINALYSIS AUTO W/SCOPE: CPT

## 2022-06-27 PROCEDURE — 83615 LACTATE (LD) (LDH) ENZYME: CPT

## 2022-06-27 PROCEDURE — 85730 THROMBOPLASTIN TIME PARTIAL: CPT

## 2022-06-27 PROCEDURE — 6360000002 HC RX W HCPCS: Performed by: INTERNAL MEDICINE

## 2022-06-27 PROCEDURE — 2580000003 HC RX 258: Performed by: NURSE PRACTITIONER

## 2022-06-27 PROCEDURE — 71045 X-RAY EXAM CHEST 1 VIEW: CPT

## 2022-06-27 PROCEDURE — 83735 ASSAY OF MAGNESIUM: CPT

## 2022-06-27 PROCEDURE — 85025 COMPLETE CBC W/AUTO DIFF WBC: CPT

## 2022-06-27 PROCEDURE — 85610 PROTHROMBIN TIME: CPT

## 2022-06-27 RX ORDER — PROCHLORPERAZINE MALEATE 10 MG
10 TABLET ORAL EVERY 6 HOURS PRN
Qty: 60 TABLET | Refills: 3 | Status: SHIPPED | OUTPATIENT
Start: 2022-06-27

## 2022-06-27 RX ORDER — HEPARIN SODIUM (PORCINE) LOCK FLUSH IV SOLN 100 UNIT/ML 100 UNIT/ML
1500 SOLUTION INTRAVENOUS ONCE
Status: COMPLETED | OUTPATIENT
Start: 2022-06-27 | End: 2022-06-27

## 2022-06-27 RX ORDER — CALCIUM CARBONATE 200(500)MG
500 TABLET,CHEWABLE ORAL 3 TIMES DAILY PRN
COMMUNITY
Start: 2022-06-27 | End: 2022-07-27

## 2022-06-27 RX ORDER — PROCHLORPERAZINE MALEATE 10 MG
10 TABLET ORAL EVERY 6 HOURS PRN
Qty: 60 TABLET | Refills: 3 | Status: SHIPPED | OUTPATIENT
Start: 2022-06-27 | End: 2022-06-27

## 2022-06-27 RX ADMIN — PANTOPRAZOLE SODIUM 40 MG: 40 TABLET, DELAYED RELEASE ORAL at 07:41

## 2022-06-27 RX ADMIN — SODIUM CHLORIDE, PRESERVATIVE FREE 10 ML: 5 INJECTION INTRAVENOUS at 08:36

## 2022-06-27 RX ADMIN — VALACYCLOVIR HYDROCHLORIDE 500 MG: 500 TABLET, FILM COATED ORAL at 08:36

## 2022-06-27 RX ADMIN — METOPROLOL TARTRATE 12.5 MG: 25 TABLET, FILM COATED ORAL at 08:36

## 2022-06-27 RX ADMIN — HEPARIN SODIUM (PORCINE) LOCK FLUSH IV SOLN 100 UNIT/ML 1500 UNITS: 100 SOLUTION at 15:45

## 2022-06-27 ASSESSMENT — PAIN SCALES - WONG BAKER
WONGBAKER_NUMERICALRESPONSE: 0

## 2022-06-27 NOTE — PROGRESS NOTES
Surgery Daily Progress Note  Peggy Longoria  CC: pS ELSA       Subjective :No acute events overnight. Patient continues to pass flatus/+BM. States diarrhea has improved Does remain tachycardic throughout evening. Pain controlled      Objective    Infusions:   sodium chloride      sodium chloride      sodium chloride      sodium chloride 20 mL/hr at 06/10/22 1838    sodium chloride          I/O:I/O last 3 completed shifts: In: 840 [P.O.:840]  Out: 3700 [Urine:1300; Other:1200; Stool:1200]           Wt Readings from Last 1 Encounters:   06/25/22 182 lb 6.4 oz (82.7 kg)                 LABS:    Recent Labs     06/26/22  0325 06/27/22  0432   WBC 3.9* 2.6*   HGB 7.9* 8.2*   HCT 23.3* 24.1*   MCV 97.8 97.5   PLT 17* 20*        Recent Labs     06/26/22  0325 06/27/22  0432    139   K 3.6 3.6    106   CO2 19* 22   PHOS 2.9 3.2   BUN <2* 3*   CREATININE 0.6 0.5*        Recent Labs     06/27/22 0432   AST 38*   ALT 35   BILIDIR 0.3   BILITOT 0.4   ALKPHOS 209*      No results for input(s): LIPASE, AMYLASE in the last 72 hours. Recent Labs     06/27/22  0420 06/27/22  0432   PROT  --  5.1*   INR 1.26*  --    APTT 28.5  --       No results for input(s): CKTOTAL, CKMB, CKMBINDEX, TROPONINI in the last 72 hours.             Exam:BP (!) 104/58   Pulse (!) 102   Temp 98.6 °F (37 °C) (Oral)   Resp 16   Ht 5' 1\" (1.549 m)   Wt 182 lb 6.4 oz (82.7 kg)   SpO2 96%   BMI 34.46 kg/m²   General appearance: alert, appears stated age and cooperative  Chest/Lungs: normal effort, no accessory muscle use, on RA  Cardiovascular: tachycardic regular rhythm  Abdomen: soft, minimally distended improved from prior, non-tender, no guarding or rigidity  Extremities: no edema, no cyanosis  Neuro: A&Ox3, no focal deficits, sensation intact     ASSESSMENT/PLAN: Pt. is a 45 y.o. female with a diagnosis of Diffuse Large B Cell Lymphoma s/p BEAM chemotherapy and autologous stem cell transplant (6/15) for whom General Surgery is following for ileus vs pSBO     - continue regular diet  - planning on discharging today per primary team  - further management per primary team      [facility], ELEAZAR - CNP 6/27/2022 6:58 AM  [phone]

## 2022-06-27 NOTE — PLAN OF CARE
Problem: Safety - Adult  Goal: Free from fall injury  Outcome: Progressing     Problem: Chronic Conditions and Co-morbidities  Goal: Patient's chronic conditions and co-morbidity symptoms are monitored and maintained or improved  Outcome: Progressing     Problem: Infection - Adult  Goal: Absence of infection during hospitalization  Outcome: Progressing  Flowsheets (Taken 6/26/2022 2341)  Absence of infection during hospitalization:   Assess and monitor for signs and symptoms of infection   Instruct and encourage patient and family to use good hand hygiene technique     Problem: Infection - Adult  Goal: Absence of fever/infection during anticipated neutropenic period  Outcome: Progressing  Flowsheets (Taken 6/26/2022 2341)  Absence of fever/infection during anticipated neutropenic period: Monitor white blood cell count     Problem: Metabolic/Fluid and Electrolytes - Adult  Goal: Electrolytes maintained within normal limits  Outcome: Progressing  Flowsheets (Taken 6/26/2022 2341)  Electrolytes maintained within normal limits: Monitor labs and assess patient for signs and symptoms of electrolyte imbalances     Problem: Hematologic - Adult  Goal: Maintains hematologic stability  Outcome: Progressing  Flowsheets (Taken 6/26/2022 2341)  Maintains hematologic stability:   Monitor labs for bleeding or clotting disorders   Administer blood products/factors as ordered     Problem: Pain  Goal: Verbalizes/displays adequate comfort level or baseline comfort level  Outcome: Completed     Problem: Metabolic/Fluid and Electrolytes - Adult  Goal: Hemodynamic stability and optimal renal function maintained  Recent Flowsheet Documentation  Taken 6/26/2022 2341 by Antonio Roesn RN  Hemodynamic stability and optimal renal function maintained:   Monitor labs and assess for signs and symptoms of volume excess or deficit   Encourage oral intake as appropriate

## 2022-06-27 NOTE — FLOWSHEET NOTE
06/27/22 1510   Encounter Summary   Encounter Overview/Reason  Spiritual/Emotional Needs   Service Provided For: Patient and family together   Referral/Consult From: [facility]   Last Encounter    (es 6/27)   Complexity of Encounter Low   Begin Time 1500   End Time  1511   Total Time Calculated 11 min   Assessment/Intervention/Outcome   Assessment Coping; Hopeful   Intervention Active listening;Nurtured Hope   Outcome Expressed feelings of Kimberlee, Peace and/or Love;Engaged in conversation; Optimistic;Receptive   Plan and Referrals   Plan/Referrals No future visits requested  (being discharged)

## 2022-06-27 NOTE — CARE COORDINATION
Case Management Assessment            Discharge Note                    Date / Time of Note: 6/27/2022 10:01 AM                  Discharge Note Completed by: HORTENSIA Choudhary    Patient Name: Jacinta Sorto   YOB: 1970  Diagnosis: Diffuse large b-cell lymphoma, lymph nodes of head, face, and neck [C83.31]  Diffuse large B cell lymphoma ([address] [C83.30]   Date / Time: 6/9/2022  9:19 AM    Current PCP: Rosaura Quiles MD  Clinic patient: No    Hospitalization in the last 30 days: No    Advance Directives:  Code Status: Full Code  [place] DNR form completed and on chart: No    Financial:  Pay[facility]: [facility] / Plan: [address] [place] [address] / Product Type: *No Product type* /      Pharmacy:    [address] [place] [address] [phone] [facility] [phone]  [place] [address]  Phone: [phone] Fax: [phone]      Assistance purchasing medications?: Potential Assistance Purchasing Medications: No  Assistance provided by Case Management: None at this time    Does patient want to participate in local refill/ meds to beds program?:      Meds To Beds General Rules:  1. Can ONLY be done Monday- Friday between 8:30am-5pm  2. Prescription(s) must be in pharmacy by 3pm to be filled same day  3. Copy of patient's insurance/ prescription drug card and patient face sheet must be sent along with the prescription(s)  4. Cost of Rx cannot be added to hospital bill. If financial assistance is needed, please contact unit  or ;  or  CANNOT provide pharmacy voucher for patients co-pays  5.  Patients can then  the prescription on their way out of the hospital at discharge, or pharmacy can deliver to the bedside if staff is available. (payment due at time of pick-up or delivery - cash, check, or card accepted)     Able to afford home medications/ co-pay costs: Yes    ADLS:  Current PT AM-PAC Score: 20 /24  Current OT AM-PAC Score: 21 /24      DISCHARGE Disposition: Home- No Services Needed    LOC at discharge: Not Applicable  ISABELA Completed: No    Notification completed in HENS/PAS?:  Not Applicable    IMM Completed:   No    Transportation:  Transportation PLAN for discharge: family   Mode of Transport: Private Car  Reason for medical transport: Not Applicable  Name of [facility]: Not Applicable  Time of Transport: afternoon    Transport form completed: No    Home Care:  [address] ordered at discharge: No  [address]: Not Applicable  Orders faxed: No    Durable Medical Equipment:  DME Provider: none  Equipment obtained during hospitalization: none    Home Oxygen and Respiratory Equipment:  Oxygen needed at discharge?: No  [address]: Not Applicable  Portable tank available for discharge?: No     Dialysis:  Dialysis patient: No    Dialysis Center:  Not Applicable    Hospice Services:  Location: Not Applicable  Agency: Not Applicable    Consents signed: No    Referrals made at [facility] for outpatient continued care:  Not Applicable    Additional CM Notes: Patient to discharge home today. [address] accommodations are being arranged [address] in [place]. The Plan for Transition of Care is related to the following treatment goals of Diffuse large b-cell lymphoma, lymph nodes of head, face, and neck [C83.31]  Diffuse large B cell lymphoma ([facility] [address] [facility] [C83.30]    The Patient and/or patient representative Johnie Chairez and her family were provided with a choice of provider and agrees with the discharge plan Yes    Freedom of choice list was provided with basic dialogue that supports the patient's individualized plan of care/goals and shares the quality data associated with the providers.  Yes    Care Transitions patient: No    Bertha Mera  Case Management Department  Ph: [phone]  Fax: [phone]

## 2022-06-27 NOTE — PROGRESS NOTES
Behavioral Health Intervention Note    Attended clinical rounds with treatment team and plan is for discharge today. Patient denied behavioral health needs today and is agreeable to call writer if she has needs after discharge.    ----------------------------------------------------------------------------------------------------------    At initial encounter with patient, discussed role of psychologist including addressing emotional and behavioral needs while receiving care at the [facility] and [place] [address]. Discussed short-term nature of services. Also discussed with patient that a component of provider's role is completing the pre-surgical psychological evaluations for bone marrow transplant and CAR-T. Informed patient of psychologist's various roles in order for the patient to be fully informed when consenting to behavioral health treatment. Patient was agreeable to engaging in care with psychologist. Discussed limits of confidentiality including that psychologist coordinates with patient's treatment team and other limits of confidentiality were discussed as needed.

## 2022-06-28 LAB
BLOOD CULTURE, ROUTINE: NORMAL
CULTURE, BLOOD 2: NORMAL

## 2022-06-28 NOTE — PROGRESS NOTES
Discharge teaching and instructions completed with patient and Aleyda LACEY using teachback method. AVS reviewed. Medications, dosages, schedule, and potential side effects reviewed with patient. Patient voiced understanding regarding prescriptions, follow up appointments, and care of self at home. Discharged in a wheelchair to  home with support per family. Central line was heparin locked at this time. Verified appropriate follow up appointments scheduled & pt instructed on how to schedule appts. Diet & activity discussed. Patient verbalized understanding and questions were answered to her satisfaction. Signed discharge instructions were given to the patient and a copy placed in the paper-lite chart.       Eugenie Coto RN

## 2022-06-30 ENCOUNTER — TELEPHONE (OUTPATIENT)
Dept: ONCOLOGY | Age: 52
End: 2022-06-30

## 2022-06-30 NOTE — TELEPHONE ENCOUNTER
Called patient for nutrition follow up s/p hospitalization for AUTO BMT. Pt states \"nutrition is slowly coming back\". RD notes weight loss of 10 lb in last 3 weeks, but per pt \"I'm happy to see that weight\", as she had reported gaining weight from steroids. Denies nutrition concerns or questions at this time.      Electronically signed by Hawa Story RD, LD on 6/30/2022 at 3:15 PM

## 2022-07-25 LAB
CULTURE, FUNGUS BLOOD: NORMAL
FUNGUS (MYCOLOGY) CULTURE: ABNORMAL
FUNGUS (MYCOLOGY) CULTURE: NORMAL
FUNGUS STAIN: ABNORMAL
FUNGUS STAIN: NORMAL
ORGANISM: ABNORMAL

## 2022-08-12 ENCOUNTER — OFFICE VISIT (OUTPATIENT)
Dept: FAMILY MEDICINE CLINIC | Facility: CLINIC | Age: 52
End: 2022-08-12

## 2022-08-12 VITALS
HEIGHT: 61 IN | OXYGEN SATURATION: 98 % | WEIGHT: 160.8 LBS | TEMPERATURE: 96.9 F | HEART RATE: 97 BPM | BODY MASS INDEX: 30.36 KG/M2 | DIASTOLIC BLOOD PRESSURE: 70 MMHG | SYSTOLIC BLOOD PRESSURE: 90 MMHG

## 2022-08-12 DIAGNOSIS — I25.10 CORONARY ARTERY DISEASE INVOLVING NATIVE CORONARY ARTERY OF NATIVE HEART WITHOUT ANGINA PECTORIS: ICD-10-CM

## 2022-08-12 DIAGNOSIS — R00.2 PALPITATIONS: ICD-10-CM

## 2022-08-12 DIAGNOSIS — F41.1 GAD (GENERALIZED ANXIETY DISORDER): ICD-10-CM

## 2022-08-12 DIAGNOSIS — I95.9 HYPOTENSION, UNSPECIFIED HYPOTENSION TYPE: ICD-10-CM

## 2022-08-12 DIAGNOSIS — F33.0 MILD EPISODE OF RECURRENT MAJOR DEPRESSIVE DISORDER: ICD-10-CM

## 2022-08-12 DIAGNOSIS — C85.80 LARGE CELL LYMPHOMA: Primary | ICD-10-CM

## 2022-08-12 PROCEDURE — 99204 OFFICE O/P NEW MOD 45 MIN: CPT | Performed by: NURSE PRACTITIONER

## 2022-08-12 RX ORDER — CITALOPRAM 20 MG/1
20 TABLET ORAL DAILY
Qty: 90 TABLET | Refills: 3 | Status: SHIPPED | OUTPATIENT
Start: 2022-08-12 | End: 2023-02-13 | Stop reason: SDUPTHER

## 2022-08-12 RX ORDER — ACYCLOVIR 800 MG/1
TABLET ORAL
COMMUNITY
Start: 2022-07-24 | End: 2023-02-13 | Stop reason: SDUPTHER

## 2022-08-12 RX ORDER — ASPIRIN 81 MG/1
TABLET, COATED ORAL
COMMUNITY
Start: 2022-07-22

## 2022-08-12 NOTE — PROGRESS NOTES
"       New Patient History and Physical      Referring Physician: No ref. provider found    Subjective     Chief Complaint:    Chief Complaint   Patient presents with   • Anxiety   • Heart Problem     Pt had heart attack in 2019   • Immunizations     Pt sts she had bone [facility] transplant 06/2022       History of Present Illness:   Here as a new patient. 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 Follows with [facility]. This was second occurrence. Orginally diagnosed in 2021. Went into remission for a short while.   She has upcoming pet scan  No pain   MI in 2019, she was treated [facility]. She had heart cath but stenosis was only 40%. Medical therapy was recommended. No stent needed. She saw cardiology in May. She was taken off heart meds. She continues asa. She is not on statin. Statins have caused issues with her liver. She continues to take metoprolol. Has trouble with low BP. Not symptomatic.  Anxiety, on celexa, buspar, and hydroxyzine, controlled    c scope in 2019  + palps  PAP in 2021  mammo in 2021    Review of Systems   Gen- No fevers, chills  CV- No chest pain, palpitations  Resp- No cough, dyspnea  GI- No N/V/D, abd pain  Neuro-No dizziness, headaches    Past Medical History:   Past Medical History:   Diagnosis Date   • Anemia    • Anxiety    • Body piercing     EARS   • Cancer (HCC) 11/20    Large B Cell lymphoma   • Chest pain     REPORTS FEB 2018 AND WAS DIAGNOSED WITH RIGHT SIDED PNEUMONIA AND PLEURICY. REPORTS EPISODE OF CHEST PAIN AGAIN IN JULY 4 2018 AND DID NOT HAVE MEDICAL EVALUATION FOR THIS.    • Coronary artery disease 10/2019   • Full dentures     INSTRUCTED NO ADHESIVES THE DOS   • Headache    • History of migraine    • History of pneumonia 02/27/2018   • Irregular heart beat     REPORTS \"SOMETIMES I FEEL IT MISS A BEAT BUT THAT MAY BE FROM MY ANXIETY\"   • Kidney stone 2021   • Murmur     REPORTS \"WHEN I WAS BORN\"   • Myocardial infarction (HCC) 2019   • Restless leg     REPORTS " HAS NEVER NEEDED MEDICATION FOR THIS BUT DOES EXPERIENCE INTERMITTENTLY   • Tattoos    • Wears glasses        Past Surgical History:  Past Surgical History:   Procedure Laterality Date   •  SECTION  1998   • COLONOSCOPY N/A 2018    Procedure: COLONOSCOPY;  Surgeon: Basil Rodarte MD;  Location: [facility];  Service: Gastroenterology   • D & C HYSTEROSCOPY N/A 08/10/2018    Procedure: DILATATION AND CURETTAGE HYSTEROSCOPY;  Surgeon: James Harden MD;  Location: [facility] OR;  Service: Obstetrics/Gynecology   • DILATATION AND CURETTAGE     • LYMPH NODE BIOPSY     • MOUTH SURGERY      FULL MOUTH EXTRACTION AT [facility]       Family History: family history includes Coronary artery disease in her mother; Hypertension in her father and mother.    Social History:  reports that she quit smoking about 6 years ago. Her smoking use included cigarettes. She has a 2.50 pack-year smoking history. She has never used smokeless tobacco. She reports that she does not drink alcohol and does not use drugs.    Medications:    Current Outpatient Medications:   •  acyclovir (ZOVIRAX) 800 MG tablet, , Disp: , Rfl:   •  Aspirin Low Dose 81 MG EC tablet, , Disp: , Rfl:   •  busPIRone (BUSPAR) 15 MG tablet, buspirone 15 mg tablet  TAKE 1 TABLET BY MOUTH TWICE DAILY AS NEEDED, Disp: , Rfl:   •  citalopram (CeleXA) 20 MG tablet, Take 1 tablet by mouth Daily., Disp: 90 tablet, Rfl: 3  •  diphenhydrAMINE (BENADRYL) 25 mg capsule, Allergy Relief (diphenhydramine) 25 mg capsule  takes -, Disp: , Rfl:   •  hydrOXYzine (VISTARIL) 25 MG capsule, take 1 capsule by mouth four times a day if needed FOR ANXIETY, Disp: , Rfl: 0  •  metoprolol succinate XL (TOPROL-XL) 25 MG 24 hr tablet, metoprolol succinate ER 25 mg tablet,extended release 24 hr, Disp: , Rfl:   •  ondansetron ODT (ZOFRAN-ODT) 4 MG disintegrating tablet, ondansetron 4 mg disintegrating tablet  DISSOLVE 1 TABLET ON THE TONGUE EVERY 8 HOURS AS NEEDED  "FOR NAUSEA, Disp: , Rfl:   •  prochlorperazine (COMPAZINE) 10 MG tablet, prochlorperazine maleate 10 mg tablet, Disp: , Rfl:     Allergies:  Allergies   Allergen Reactions   • Oxycodone Swelling   • Albuterol Other (See Comments)     REPORTS \"IT MADE MY HEART HURT AND RACE REALLY BAD\"   • Oxycodone-Acetaminophen Swelling       Objective     Vital Signs:   Vitals:    08/12/22 1400   BP: 90/70   Pulse: 97   Temp: 96.9 °F (36.1 °C)   SpO2: 98%   Weight: 72.9 kg (160 lb 12.8 oz)   Height: 154.9 cm (61\")   PainSc: 0-No pain     Body mass index is 30.38 kg/m².    Physical Exam:    Physical Exam  Vitals and nursing note reviewed.   Constitutional:       Appearance: Normal appearance. She is well-developed.   HENT:      Head: Normocephalic and atraumatic.      Right Ear: Tympanic membrane, ear canal and external ear normal.      Left Ear: Tympanic membrane, ear canal and external ear normal.      Nose: Nose normal.      Mouth/Throat:      Pharynx: Uvula midline.   Eyes:      General: Lids are normal. No scleral icterus.     Conjunctiva/sclera: Conjunctivae normal.      Pupils: Pupils are equal, round, and reactive to light.   Neck:      Thyroid: No thyromegaly.      Vascular: No carotid bruit.      Trachea: No tracheal deviation.   Cardiovascular:      Rate and Rhythm: Normal rate and regular rhythm.      Heart sounds: Normal heart sounds. No murmur heard.    No friction rub. No gallop.      Comments: + palps  Pulmonary:      Effort: Pulmonary effort is normal.      Breath sounds: Normal breath sounds.   Chest:          Comments: Deep line noted  Abdominal:      General: Bowel sounds are normal. There is no distension.      Palpations: Abdomen is soft.      Tenderness: There is no abdominal tenderness.   Musculoskeletal:      Cervical back: Neck supple.      Right lower leg: No edema.      Left lower leg: No edema.   Lymphadenopathy:      Head:      Right side of head: No submental, submandibular, tonsillar, preauricular or " posterior auricular adenopathy.      Left side of head: No submental, submandibular, tonsillar, preauricular or posterior auricular adenopathy.      Cervical: No cervical adenopathy.   Skin:     General: Skin is warm and dry.      Capillary Refill: Capillary refill takes less than 2 seconds.      Findings: No rash.   Neurological:      General: No focal deficit present.      Mental Status: She is alert and oriented to person, place, and time.      Cranial Nerves: No cranial nerve deficit.      Sensory: No sensory deficit.   Psychiatric:         Mood and Affect: Mood normal.         Behavior: Behavior normal.         Assessment / Plan     Assessment/Plan:   Problem List Items Addressed This Visit    None     Visit Diagnoses     Large cell lymphoma (HCC)    -  Primary    Mild episode of recurrent major depressive disorder (HCC)        Relevant Medications    citalopram (CeleXA) 20 MG tablet    STEPHANE (generalized anxiety disorder)        Relevant Medications    citalopram (CeleXA) 20 MG tablet    Coronary artery disease involving native coronary artery of native heart without angina pectoris        Palpitations        Hypotension, unspecified hypotension type            -- cancer care managed by hem/onc with , continue f/u  -- mood controlled on current regimen, continue   -- may need to consider stopping metoprolol due to hypotension but will defer to cards      I have reviewed pertinent health maintenance applicable to this patient.    Follow up:  Return in about 6 months (around 2/12/2023).    Electronically signed by KM Meier   08/12/2022 14:15 EDT      Please note that portions of this note may have been completed with a voice recognition program. Efforts were made to edit the dictations, but occasionally words are mistranscribed.

## 2022-08-19 ENCOUNTER — PATIENT ROUNDING (BHMG ONLY) (OUTPATIENT)
Dept: FAMILY MEDICINE CLINIC | Facility: CLINIC | Age: 52
End: 2022-08-19

## 2022-09-23 ENCOUNTER — TELEPHONE (OUTPATIENT)
Dept: ONCOLOGY | Age: 52
End: 2022-09-23

## 2022-09-23 NOTE — TELEPHONE ENCOUNTER
Behavioral Health Follow-up Note    Purpose for call: Psychologist called patient for routine 100-day follow-up post-autologous stem cell transplant. Subjective:    Patient reports that she is doing well. She is waiting for her PET scan results, which is anxiety provoking. She states that she has an appointment with her oncologist on 10/4/2022 which is when she will get her PET results. States that she feels that she is managing this anxiety well. Denied behavioral health needs at this time.     Recommendations/Plan:  Follow-up as needed

## 2022-11-18 ENCOUNTER — TELEPHONE (OUTPATIENT)
Dept: FAMILY MEDICINE CLINIC | Facility: CLINIC | Age: 52
End: 2022-11-18

## 2022-11-18 NOTE — TELEPHONE ENCOUNTER
Patient called wanting to be seen today but no openings for today. Has head congestion, hoarse and complains of throat being sore. I advised her to go to urgent care since we couldn't get her in. She also wanted to see if maybe she could have something called in to help her through the weekend.

## 2023-02-13 ENCOUNTER — OFFICE VISIT (OUTPATIENT)
Dept: FAMILY MEDICINE CLINIC | Facility: CLINIC | Age: 53
End: 2023-02-13
Payer: MEDICAID

## 2023-02-13 VITALS
HEIGHT: 61 IN | SYSTOLIC BLOOD PRESSURE: 100 MMHG | HEART RATE: 85 BPM | TEMPERATURE: 97.8 F | DIASTOLIC BLOOD PRESSURE: 68 MMHG | WEIGHT: 145.6 LBS | BODY MASS INDEX: 27.49 KG/M2 | OXYGEN SATURATION: 95 %

## 2023-02-13 DIAGNOSIS — C85.80 LARGE CELL LYMPHOMA: Primary | ICD-10-CM

## 2023-02-13 DIAGNOSIS — F33.0 MILD EPISODE OF RECURRENT MAJOR DEPRESSIVE DISORDER: ICD-10-CM

## 2023-02-13 DIAGNOSIS — R00.2 PALPITATIONS: ICD-10-CM

## 2023-02-13 DIAGNOSIS — G47.00 INSOMNIA, UNSPECIFIED TYPE: ICD-10-CM

## 2023-02-13 DIAGNOSIS — I25.10 CORONARY ARTERY DISEASE INVOLVING NATIVE CORONARY ARTERY OF NATIVE HEART WITHOUT ANGINA PECTORIS: ICD-10-CM

## 2023-02-13 DIAGNOSIS — F41.1 GAD (GENERALIZED ANXIETY DISORDER): ICD-10-CM

## 2023-02-13 PROCEDURE — 99214 OFFICE O/P EST MOD 30 MIN: CPT | Performed by: NURSE PRACTITIONER

## 2023-02-13 RX ORDER — CITALOPRAM 20 MG/1
20 TABLET ORAL DAILY
Qty: 90 TABLET | Refills: 3 | Status: SHIPPED | OUTPATIENT
Start: 2023-02-13

## 2023-02-13 RX ORDER — METOPROLOL SUCCINATE 25 MG/1
12.5 TABLET, EXTENDED RELEASE ORAL DAILY
Start: 2023-02-13

## 2023-02-13 RX ORDER — BUSPIRONE HYDROCHLORIDE 15 MG/1
15 TABLET ORAL 2 TIMES DAILY PRN
Qty: 60 TABLET | Refills: 5 | Status: SHIPPED | OUTPATIENT
Start: 2023-02-13

## 2023-02-13 RX ORDER — ACYCLOVIR 800 MG/1
800 TABLET ORAL 2 TIMES DAILY
Qty: 180 TABLET | Refills: 1 | Status: SHIPPED | OUTPATIENT
Start: 2023-02-13

## 2023-02-13 RX ORDER — TRAZODONE HYDROCHLORIDE 50 MG/1
TABLET ORAL
Qty: 180 TABLET | Refills: 1 | Status: SHIPPED | OUTPATIENT
Start: 2023-02-13

## 2023-02-13 NOTE — PROGRESS NOTES
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 Follows with [facility]. This was second occurrence. Orginally diagnosed in 2021. Went into remission for a short while.   Recent PET scan stable, thyroiditis noted with normal TSH, has appt with endo  Hemo/onc noted reviewed  Needs covid vaccine   MI in 2019, she was treated [facility]. She had heart cath but stenosis was only 40%. Medical therapy was recommended. No stent needed. She saw cardiology in May. She was taken off heart meds. She continues asa. She is not on statin. Statins have caused issues with her liver. She continues to take metoprolol. Has trouble with low BP. Not symptomatic. Follows with Dr. Arboleda but has not seen in awhile   Anxiety, on celexa and buspar, uses buspar prn controlled          Review of Systems  Gen- No fevers, chills  CV- No chest pain, palpitations  Resp- No cough, dyspnea  GI- No N/V/D, abd pain  Neuro-No dizziness, headaches      I have reviewed and/or updated the patient's past medical, surgical, family, social history and problem list as appropriate.     Medications:    Current Outpatient Medications:   •  acyclovir (ZOVIRAX) 800 MG tablet, Take 1 tablet by mouth 2 (Two) Times a Day., Disp: 180 tablet, Rfl: 1  •  Aspirin Low Dose 81 MG EC tablet, , Disp: , Rfl:   •  busPIRone (BUSPAR) 15 MG tablet, Take 1 tablet by mouth 2 (Two) Times a Day As Needed (anxiety)., Disp: 60 tablet, Rfl: 5  •  citalopram (CeleXA) 20 MG tablet, Take 1 tablet by mouth Daily., Disp: 90 tablet, Rfl: 3  •  diphenhydrAMINE (BENADRYL) 25 mg capsule, Allergy Relief (diphenhydramine) 25 mg capsule  takes M-W-F, Disp: , Rfl:   •  metoprolol succinate XL (TOPROL-XL) 25 MG 24 hr tablet, Take 0.5 tablets by mouth Daily., Disp: , Rfl:   •  ondansetron ODT (ZOFRAN-ODT) 4 MG disintegrating tablet, ondansetron 4 mg disintegrating  "tablet  DISSOLVE 1 TABLET ON THE TONGUE EVERY 8 HOURS AS NEEDED FOR NAUSEA, Disp: , Rfl:   •  POTASSIUM CHLORIDE PO, Take  by mouth., Disp: , Rfl:   •  prochlorperazine (COMPAZINE) 10 MG tablet, prochlorperazine maleate 10 mg tablet, Disp: , Rfl:   •  traZODone (DESYREL) 50 MG tablet, 1-2 po hs prn sleep, Disp: 180 tablet, Rfl: 1    Allergies:  Allergies   Allergen Reactions   • Oxycodone Swelling   • Albuterol Other (See Comments)     REPORTS \"IT MADE MY HEART HURT AND RACE REALLY BAD\"   • Lorazepam Hallucinations   • Oxycodone-Acetaminophen Swelling       Objective     Vital Signs:   Vitals:    02/13/23 1344   BP: 100/68   Pulse: 85   Temp: 97.8 °F (36.6 °C)   SpO2: 95%   Weight: 66 kg (145 lb 9.6 oz)   Height: 154.9 cm (60.98\")   PainSc: 0-No pain     Body mass index is 27.53 kg/m².    Physical Exam:    Physical Exam  Vitals and nursing note reviewed.   Constitutional:       Appearance: She is well-developed.   HENT:      Head: Normocephalic and atraumatic.   Eyes:      Pupils: Pupils are equal, round, and reactive to light.   Cardiovascular:      Rate and Rhythm: Normal rate and regular rhythm.      Heart sounds: Normal heart sounds.   Pulmonary:      Effort: Pulmonary effort is normal.      Breath sounds: Normal breath sounds.   Musculoskeletal:      Cervical back: Neck supple.   Skin:     General: Skin is warm and dry.      Capillary Refill: Capillary refill takes less than 2 seconds.   Neurological:      General: No focal deficit present.      Mental Status: She is alert and oriented to person, place, and time.   Psychiatric:         Mood and Affect: Mood normal.         Behavior: Behavior normal.         Assessment / Plan     Assessment/Plan:   Problem List Items Addressed This Visit    None  Visit Diagnoses     Large cell lymphoma (HCC)    -  Primary    Relevant Medications    acyclovir (ZOVIRAX) 800 MG tablet    Mild episode of recurrent major depressive disorder (HCC)        Relevant Medications    " citalopram (CeleXA) 20 MG tablet    busPIRone (BUSPAR) 15 MG tablet    traZODone (DESYREL) 50 MG tablet    STEPHANE (generalized anxiety disorder)        Relevant Medications    citalopram (CeleXA) 20 MG tablet    busPIRone (BUSPAR) 15 MG tablet    traZODone (DESYREL) 50 MG tablet    Palpitations        Relevant Medications    metoprolol succinate XL (TOPROL-XL) 25 MG 24 hr tablet    Coronary artery disease involving native coronary artery of native heart without angina pectoris        Relevant Medications    metoprolol succinate XL (TOPROL-XL) 25 MG 24 hr tablet    Insomnia, unspecified type        Relevant Medications    traZODone (DESYREL) 50 MG tablet        -- add trazodone for sleep  -- mood controlled  -- continue f/u with hem/onc  -- she has appt with endo, I anticipate just needing to check her TSH periodically, was normal last month.   -- discussed covid vaccine and asked her to check with her pharmacy. We only have bivalent vaccine    Discussed plan of care in detail with pt today; pt verb understanding and agrees.    Follow up:  Return in about 6 months (around 8/13/2023).    Electronically signed by KM Meier   02/13/2023 13:56 EST      Please note that portions of this note were completed with a voice recognition program.

## 2023-08-14 ENCOUNTER — OFFICE VISIT (OUTPATIENT)
Dept: FAMILY MEDICINE CLINIC | Facility: CLINIC | Age: 53
End: 2023-08-14
Payer: MEDICAID

## 2023-08-14 VITALS
OXYGEN SATURATION: 99 % | WEIGHT: 142.25 LBS | DIASTOLIC BLOOD PRESSURE: 80 MMHG | BODY MASS INDEX: 26.86 KG/M2 | HEART RATE: 63 BPM | SYSTOLIC BLOOD PRESSURE: 110 MMHG | HEIGHT: 61 IN

## 2023-08-14 DIAGNOSIS — G47.00 INSOMNIA, UNSPECIFIED TYPE: ICD-10-CM

## 2023-08-14 DIAGNOSIS — R00.2 PALPITATIONS: ICD-10-CM

## 2023-08-14 DIAGNOSIS — I25.10 CORONARY ARTERY DISEASE INVOLVING NATIVE CORONARY ARTERY OF NATIVE HEART WITHOUT ANGINA PECTORIS: Primary | ICD-10-CM

## 2023-08-14 DIAGNOSIS — C85.80 LARGE CELL LYMPHOMA: ICD-10-CM

## 2023-08-14 DIAGNOSIS — F41.1 GAD (GENERALIZED ANXIETY DISORDER): ICD-10-CM

## 2023-08-14 DIAGNOSIS — F33.0 MILD EPISODE OF RECURRENT MAJOR DEPRESSIVE DISORDER: ICD-10-CM

## 2023-08-14 PROCEDURE — 99214 OFFICE O/P EST MOD 30 MIN: CPT | Performed by: NURSE PRACTITIONER

## 2023-08-14 PROCEDURE — 1159F MED LIST DOCD IN RCRD: CPT | Performed by: NURSE PRACTITIONER

## 2023-08-14 PROCEDURE — 1160F RVW MEDS BY RX/DR IN RCRD: CPT | Performed by: NURSE PRACTITIONER

## 2023-08-14 RX ORDER — ASPIRIN 81 MG/1
81 TABLET, COATED ORAL DAILY
Qty: 90 TABLET | Refills: 3 | Status: SHIPPED | OUTPATIENT
Start: 2023-08-14

## 2023-08-14 RX ORDER — ROSUVASTATIN CALCIUM 20 MG/1
1 TABLET, COATED ORAL DAILY
COMMUNITY
Start: 2023-07-17 | End: 2023-08-14

## 2023-08-14 RX ORDER — TRIAMCINOLONE ACETONIDE 1 MG/G
CREAM TOPICAL
COMMUNITY
Start: 2023-06-15

## 2023-08-14 NOTE — PROGRESS NOTES
Subjective     Chief Complaint:    Chief Complaint   Patient presents with    Lymphoma     6 month follow up for lymphoma.       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 Follows with . This was second occurrence. Orginally diagnosed in 2021. Now in remission, has had all her updated vaccines in   Thyroid nodules, small, negative FNA, saw endo, repeat US in 1 year with f/u endo [facility]  Hemo/onc noted reviewed  Needs covid vaccine   MI in 2019, she was treated [facility]. She had heart cath but stenosis was only 40%. Medical therapy was recommended. No stent needed. She saw cardiology in May. She was taken off heart meds. She continues asa. She is not on statin. Statins have caused issues with her liver. She tried crestor recently but it caused side effects so she stopped it. She continues to take metoprolol.   Anxiety, on celexa and buspar, uses buspar prn controlled    Insomnia on trazodone, uses prn     Review of Systems  Gen- No fevers, chills  CV- No chest pain, palpitations  Resp- No cough, dyspnea  GI- No N/V/D, abd pain  Neuro-No dizziness, headaches      I have reviewed and/or updated the patient's past medical, surgical, family, social history and problem list as appropriate.     Medications:    Current Outpatient Medications:     Aspirin Low Dose 81 MG EC tablet, Take 1 tablet by mouth Daily., Disp: 90 tablet, Rfl: 3    busPIRone (BUSPAR) 15 MG tablet, Take 1 tablet by mouth 2 (Two) Times a Day As Needed (anxiety)., Disp: 60 tablet, Rfl: 5    citalopram (CeleXA) 20 MG tablet, Take 1 tablet by mouth Daily., Disp: 90 tablet, Rfl: 3    metoprolol succinate XL (TOPROL-XL) 25 MG 24 hr tablet, Take 0.5 tablets by mouth Daily., Disp: , Rfl:     mupirocin (BACTROBAN) 2 % ointment, APPLY TO WOUND THREE TIMES DAILY, Disp: , Rfl:     traZODone (DESYREL) 50 MG tablet, 1-2 po hs prn sleep, Disp: 180 tablet, Rfl: 1    triamcinolone (KENALOG) 0.1 % cream, APPLY  "TOPICALLY TO THE AFFECTED AREA THREE TIMES DAILY FOR RASH, Disp: , Rfl:     Allergies:  Allergies   Allergen Reactions    Oxycodone Swelling    Albuterol Other (See Comments)     REPORTS \"IT MADE MY HEART HURT AND RACE REALLY BAD\"    Lorazepam Hallucinations    Oxycodone-Acetaminophen Swelling       Objective     Vital Signs:   Vitals:    08/14/23 0923   BP: 110/80   Pulse: 63   SpO2: 99%   Weight: 64.5 kg (142 lb 4 oz)   Height: 154.9 cm (60.98\")     Body mass index is 26.89 kg/mý.    Physical Exam:    Physical Exam  Vitals and nursing note reviewed.   Constitutional:       Appearance: She is well-developed.   HENT:      Head: Normocephalic and atraumatic.   Eyes:      Pupils: Pupils are equal, round, and reactive to light.   Neck:      Thyroid: No thyromegaly.   Cardiovascular:      Rate and Rhythm: Normal rate and regular rhythm.      Heart sounds: Normal heart sounds.   Pulmonary:      Effort: Pulmonary effort is normal.      Breath sounds: Normal breath sounds.   Musculoskeletal:      Cervical back: Neck supple.   Skin:     General: Skin is warm and dry.      Capillary Refill: Capillary refill takes less than 2 seconds.   Neurological:      General: No focal deficit present.      Mental Status: She is alert and oriented to person, place, and time.   Psychiatric:         Mood and Affect: Mood normal.         Behavior: Behavior normal.       Assessment / Plan     Assessment/Plan:   Problem List Items Addressed This Visit          Cardiac and Vasculature    Coronary artery disease involving native coronary artery of native heart without angina pectoris - Primary    Relevant Medications    metoprolol succinate XL (TOPROL-XL) 25 MG 24 hr tablet    Aspirin Low Dose 81 MG EC tablet    Palpitations    Relevant Medications    metoprolol succinate XL (TOPROL-XL) 25 MG 24 hr tablet       Hematology and Neoplasia    Large cell lymphoma    Current Assessment & Plan     Remission s/p BMB June 2022            Mental Health    " Mild episode of recurrent major depressive disorder    Relevant Medications    citalopram (CeleXA) 20 MG tablet    busPIRone (BUSPAR) 15 MG tablet    traZODone (DESYREL) 50 MG tablet    STEPHANE (generalized anxiety disorder)    Relevant Medications    citalopram (CeleXA) 20 MG tablet    busPIRone (BUSPAR) 15 MG tablet    traZODone (DESYREL) 50 MG tablet       Sleep    Insomnia    Relevant Medications    traZODone (DESYREL) 50 MG tablet     -- continue f/u with hem/onc  -- continue trazodone for sleep  -- mood controlled  -- continue f/u with endo, note reviewed  -- continue f/u with Dr. Powell, her LDL was 198, we discussed injectable cholesterol medications vs decreasing crestor dose    Discussed plan of care in detail with pt today; pt verb understanding and agrees.    Follow up:  6 months, annual exam    Electronically signed by KM Meier         Please note that portions of this note were completed with a voice recognition program.

## 2023-11-02 ENCOUNTER — OFFICE VISIT (OUTPATIENT)
Dept: FAMILY MEDICINE CLINIC | Facility: CLINIC | Age: 53
End: 2023-11-02
Payer: COMMERCIAL

## 2023-11-02 VITALS
HEIGHT: 61 IN | OXYGEN SATURATION: 99 % | TEMPERATURE: 99.3 F | SYSTOLIC BLOOD PRESSURE: 86 MMHG | WEIGHT: 142.2 LBS | HEART RATE: 67 BPM | BODY MASS INDEX: 26.85 KG/M2 | DIASTOLIC BLOOD PRESSURE: 58 MMHG

## 2023-11-02 DIAGNOSIS — R05.9 COUGH, UNSPECIFIED TYPE: ICD-10-CM

## 2023-11-02 DIAGNOSIS — U07.1 COVID-19 VIRUS INFECTION: Primary | ICD-10-CM

## 2023-11-02 DIAGNOSIS — C83.30 DIFFUSE LARGE CELL LYMPHOMA IN REMISSION: ICD-10-CM

## 2023-11-02 LAB
EXPIRATION DATE: ABNORMAL
FLUAV AG UPPER RESP QL IA.RAPID: NOT DETECTED
FLUBV AG UPPER RESP QL IA.RAPID: NOT DETECTED
INTERNAL CONTROL: ABNORMAL
Lab: ABNORMAL
SARS-COV-2 AG UPPER RESP QL IA.RAPID: DETECTED

## 2023-11-02 RX ORDER — ROSUVASTATIN CALCIUM 20 MG/1
20 TABLET, COATED ORAL DAILY
COMMUNITY
Start: 2023-07-17

## 2023-11-02 RX ORDER — BENZONATATE 100 MG/1
100 CAPSULE ORAL 3 TIMES DAILY PRN
Qty: 20 CAPSULE | Refills: 0 | Status: SHIPPED | OUTPATIENT
Start: 2023-11-02

## 2023-11-02 NOTE — PROGRESS NOTES
"    Office Note     Name: Светлана Bates  : 1970   MRN: [id number]     Chief Complaint:  Fever, Cough, Shortness of Breath, and Dizziness    Subjective     History of Present Illness:  Светлана Bates is a 53 y.o. female who presents today for cough, fever, shortness of breath, dizziness.    Cough  This is a new problem. The current episode started yesterday. The problem has been rapidly worsening. The problem occurs every few minutes. The cough is Non-productive. Associated symptoms include chills, a fever, headaches, myalgias, nasal congestion, postnasal drip, rhinorrhea and shortness of breath. Pertinent negatives include no chest pain, ear congestion, ear pain, heartburn, hemoptysis, rash, sore throat, sweats, weight loss or wheezing. The symptoms are aggravated by cold air. She has tried nothing for the symptoms. The treatment provided no relief. 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     Objective     Vital Signs  BP (!) 86/58 (BP Location: Left arm, Patient Position: Sitting, Cuff Size: Adult)   Pulse 67   Temp 99.3 °F (37.4 °C) (Temporal)   Ht 154.9 cm (61\") Comment: patient stated  Wt 64.5 kg (142 lb 3.2 oz)   SpO2 99%   BMI 26.87 kg/m²   Estimated body mass index is 26.87 kg/m² as calculated from the following:    Height as of this encounter: 154.9 cm (61\").    Weight as of this encounter: 64.5 kg (142 lb 3.2 oz).    Physical Exam  Vitals reviewed.   Constitutional:       General: She is not in acute distress.     Appearance: Normal appearance. She is not ill-appearing.   HENT:      Head: Normocephalic.      Right Ear: Tympanic membrane, ear canal and external ear normal.      Left Ear: Tympanic membrane, ear canal and external " ear normal.      Nose: No congestion or rhinorrhea.      Mouth/Throat:      Mouth: Mucous membranes are moist.      Pharynx: No oropharyngeal exudate or posterior oropharyngeal erythema.   Eyes:      Extraocular Movements: Extraocular movements intact.   Cardiovascular:      Rate and Rhythm: Normal rate and regular rhythm.      Heart sounds: Normal heart sounds. No murmur heard.  Pulmonary:      Effort: Pulmonary effort is normal. No respiratory distress.      Breath sounds: Normal breath sounds. No stridor. No wheezing, rhonchi or rales.   Musculoskeletal:      Cervical back: Normal range of motion and neck supple. No rigidity.   Lymphadenopathy:      Cervical: Cervical adenopathy present.   Neurological:      Mental Status: She is alert and oriented to person, place, and time.   Psychiatric:         Mood and Affect: Mood normal.         Behavior: Behavior normal.                      Assessment and Plan     Diagnoses and all orders for this visit:    1. COVID-19 virus infection (Primary)  -     POCT SARS-CoV-2 Antigen DORCAS + Flu  -     Nirmatrelvir&Ritonavir 300/100 (PAXLOVID) 20 x 150 MG & 10 x 100MG tablet therapy pack tablet; Take 3 tablets by mouth 2 (Two) Times a Day for 5 days.  Dispense: 30 tablet; Refill: 0    2. Cough, unspecified type  -     POCT SARS-CoV-2 Antigen DORCAS + Flu  -     benzonatate (Tessalon Perles) 100 MG capsule; Take 1 capsule by mouth 3 (Three) Times a Day As Needed for Cough.  Dispense: 20 capsule; Refill: 0    3. Diffuse large cell lymphoma in remission      COVID exposure  POC COVID test is positive  POC influenza neg  Patient is in remission for Lg Cell lymphoma  Using shared decision making will start paxlovid  Symptomatic treatment. Will also start benzonatate for cough  Patient advised to self-quarantine for 5 days  Return to work letter for 11/8/23 given to patient. She may return to work on 11/8/23 as long as fever free for 24 hrs         Discussion Summary:     Discussed plan of  care in detail with patient today. Patient was encouraged to keep me informed of any acute changes, lack of improvement, or any new concerning symptoms.  Patient is also aware of reasons to seek emergent care. Patient verbalized understanding and agrees with plan of care.    This visit was billed based on MDM.    Follow Up  Return if symptoms worsen or fail to improve.    At [facility], we believe that sharing information builds trust and better relationships. You are receiving this note because you recently visited [facility]. It is possible you will see health information before a provider has talked with you about it. This kind of information can be easy to misunderstand. To help you fully understand what it means for your health, we urge you to discuss this note with your provider.    Zuhair Estevez MD, MPH  [facility]

## 2023-11-02 NOTE — LETTER
November 2, 2023     Patient: Светлана Bates   YOB: 1970   Date of Visit: 11/2/2023       To Whom It May Concern:    It is my medical opinion that Светлана Bates may return to work in 6 days on 11/8/2023. Please contact me at the above number if you have any questions.          Sincerely,        Zuhair Estevez MD    CC: No Recipients

## 2024-03-08 ENCOUNTER — OFFICE VISIT (OUTPATIENT)
Dept: FAMILY MEDICINE CLINIC | Facility: CLINIC | Age: 54
End: 2024-03-08
Payer: COMMERCIAL

## 2024-03-08 VITALS
BODY MASS INDEX: 29.45 KG/M2 | HEART RATE: 77 BPM | HEIGHT: 60 IN | SYSTOLIC BLOOD PRESSURE: 95 MMHG | OXYGEN SATURATION: 99 % | DIASTOLIC BLOOD PRESSURE: 65 MMHG | WEIGHT: 150 LBS

## 2024-03-08 DIAGNOSIS — Z12.31 SCREENING MAMMOGRAM FOR BREAST CANCER: ICD-10-CM

## 2024-03-08 DIAGNOSIS — F41.1 GAD (GENERALIZED ANXIETY DISORDER): ICD-10-CM

## 2024-03-08 DIAGNOSIS — F33.0 MILD EPISODE OF RECURRENT MAJOR DEPRESSIVE DISORDER: ICD-10-CM

## 2024-03-08 DIAGNOSIS — F51.01 PRIMARY INSOMNIA: ICD-10-CM

## 2024-03-08 DIAGNOSIS — C85.80 LARGE CELL LYMPHOMA: ICD-10-CM

## 2024-03-08 DIAGNOSIS — D22.9 CHANGING NEVUS: ICD-10-CM

## 2024-03-08 DIAGNOSIS — I25.10 CORONARY ARTERY DISEASE INVOLVING NATIVE CORONARY ARTERY OF NATIVE HEART WITHOUT ANGINA PECTORIS: ICD-10-CM

## 2024-03-08 DIAGNOSIS — R00.2 PALPITATIONS: ICD-10-CM

## 2024-03-08 DIAGNOSIS — Z00.00 ANNUAL PHYSICAL EXAM: Primary | ICD-10-CM

## 2024-03-08 DIAGNOSIS — E04.1 THYROID NODULE: ICD-10-CM

## 2024-03-08 DIAGNOSIS — C83.30 DIFFUSE LARGE CELL LYMPHOMA IN REMISSION: ICD-10-CM

## 2024-03-08 PROBLEM — C83.3A DIFFUSE LARGE CELL LYMPHOMA IN REMISSION: Status: ACTIVE | Noted: 2024-03-08

## 2024-03-08 RX ORDER — CITALOPRAM 20 MG/1
20 TABLET ORAL DAILY
Qty: 90 TABLET | Refills: 3 | Status: SHIPPED | OUTPATIENT
Start: 2024-03-08

## 2024-03-08 RX ORDER — ROSUVASTATIN CALCIUM 20 MG/1
20 TABLET, COATED ORAL DAILY
Qty: 90 TABLET | Refills: 3 | Status: SHIPPED | OUTPATIENT
Start: 2024-03-08

## 2024-03-08 RX ORDER — ASPIRIN 81 MG/1
81 TABLET, COATED ORAL DAILY
Qty: 90 TABLET | Refills: 3 | Status: SHIPPED | OUTPATIENT
Start: 2024-03-08

## 2024-03-08 RX ORDER — ROSUVASTATIN CALCIUM 20 MG/1
20 TABLET, COATED ORAL DAILY
Qty: 90 TABLET | Refills: 1 | Status: SHIPPED | OUTPATIENT
Start: 2024-03-08 | End: 2024-03-08 | Stop reason: SDUPTHER

## 2024-03-08 NOTE — PROGRESS NOTES
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 Follows with . This was second occurrence. Orginally diagnosed in 2021. Now in remission  Thyroid nodules, small, negative FNA, saw endo, repeat US in 1 year with f/u endo , has appt in may   Hemo/onc noted reviewed  Needs covid vaccine   Bilateral hydronephrosis, currently being monitored by  urology and she will have a repeat scan  Spot on liver, following with hepatology at   [facility] in [facility], she was treated [facility]. She had heart cath but stenosis was only 40%. Medical therapy was recommended. No stent needed. She saw cardiology in May. She was taken off heart meds. She continues asa. She is not on statin. Statins have caused issues with her liver. She tried crestor recently but it caused side effects so she stopped it. She continues to take metoprolol.   Anxiety, on celexa and buspar, uses buspar prn controlled    Insomnia on trazodone, uses prn   Bowel movements normal  Uop normal   Appetite good   Wears seat belt   Eye exam last year  Due for pap, last one         Review of Systems  Gen- No fevers, chills  CV- No chest pain, palpitations  Resp- No cough, dyspnea  GI- No N/V/D, abd pain  Neuro-No dizziness, headaches      I have reviewed and/or updated the patient's past medical, surgical, family, social history and problem list as appropriate.     Medications:    Current Outpatient Medications:     Aspirin Low Dose 81 MG EC tablet, Take 1 tablet by mouth Daily., Disp: 90 tablet, Rfl: 3    busPIRone (BUSPAR) 15 MG tablet, Take 1 tablet by mouth 2 (Two) Times a Day As Needed (anxiety)., Disp: 60 tablet, Rfl: 5    citalopram (CeleXA) 20 MG tablet, Take 1 tablet by mouth Daily., Disp: 90 tablet, Rfl: 3    metoprolol succinate XL (TOPROL-XL) 25 MG 24 hr tablet, Take 0.5 tablets by  "mouth Daily., Disp: , Rfl:     rosuvastatin (CRESTOR) 20 MG tablet, Take 1 tablet by mouth Daily., Disp: 90 tablet, Rfl: 3    traZODone (DESYREL) 50 MG tablet, 1-2 po hs prn sleep, Disp: 180 tablet, Rfl: 1    triamcinolone (KENALOG) 0.1 % cream, APPLY TOPICALLY TO THE AFFECTED AREA THREE TIMES DAILY FOR RASH, Disp: , Rfl:     Allergies:  Allergies   Allergen Reactions    Oxycodone Swelling    Albuterol Other (See Comments)     REPORTS \"IT MADE MY HEART HURT AND RACE REALLY BAD\"    Lorazepam Hallucinations    Albuterol Sulfate Other (See Comments)    Oxycodone-Acetaminophen Swelling       Objective     Vital Signs:   Vitals:    03/08/24 0907   BP: 95/65   Pulse: 77   SpO2: 99%   Weight: 68 kg (150 lb)   Height: 152.4 cm (60\")     Body mass index is 29.29 kg/m².    Physical Exam:    Physical Exam  Vitals and nursing note reviewed.   Constitutional:       Appearance: She is well-developed.   HENT:      Head: Normocephalic and atraumatic.      Right Ear: Tympanic membrane, ear canal and external ear normal.      Left Ear: Tympanic membrane, ear canal and external ear normal.      Nose: Nose normal.      Mouth/Throat:      Mouth: Mucous membranes are moist.   Eyes:      Pupils: Pupils are equal, round, and reactive to light.   Neck:      Thyroid: No thyromegaly.   Cardiovascular:      Rate and Rhythm: Normal rate and regular rhythm.      Heart sounds: Normal heart sounds.   Pulmonary:      Effort: Pulmonary effort is normal.      Breath sounds: Normal breath sounds.   Abdominal:      General: Bowel sounds are normal. There is no distension.      Palpations: Abdomen is soft.      Tenderness: There is abdominal tenderness (very mild generalized).   Musculoskeletal:      Cervical back: Neck supple.   Skin:     General: Skin is warm and dry.      Capillary Refill: Capillary refill takes less than 2 seconds.      Findings: Lesion (atypical mole on back) present.   Neurological:      General: No focal deficit present.      " Mental Status: She is alert and oriented to person, place, and time.   Psychiatric:         Mood and Affect: Mood normal.         Behavior: Behavior normal.         Assessment / Plan     Assessment/Plan:   Problem List Items Addressed This Visit       Coronary artery disease involving native coronary artery of native heart without angina pectoris    Relevant Medications    metoprolol succinate XL (TOPROL-XL) 25 MG 24 hr tablet    Aspirin Low Dose 81 MG EC tablet    rosuvastatin (CRESTOR) 20 MG tablet    Other Relevant Orders    Lipid Panel    Mild episode of recurrent major depressive disorder    Relevant Medications    busPIRone (BUSPAR) 15 MG tablet    traZODone (DESYREL) 50 MG tablet    citalopram (CeleXA) 20 MG tablet    STEPHANE (generalized anxiety disorder)    Relevant Medications    busPIRone (BUSPAR) 15 MG tablet    traZODone (DESYREL) 50 MG tablet    citalopram (CeleXA) 20 MG tablet    Large cell lymphoma    Insomnia    Relevant Medications    traZODone (DESYREL) 50 MG tablet    Palpitations    Relevant Medications    metoprolol succinate XL (TOPROL-XL) 25 MG 24 hr tablet    Diffuse large cell lymphoma in remission     Other Visit Diagnoses       Annual physical exam    -  Primary    Screening mammogram for breast cancer        Relevant Orders    Mammo Screening Digital Tomosynthesis Bilateral With CAD    Changing nevus        Thyroid nodule        Relevant Orders    TSH Rfx On Abnormal To Free T4          -- physical performed today, encouraged clean eating, whole foods, limit processed and sugary foods, exercise 150min/week as tolerated  -- continue f/u with hem/onc  -- continue trazodone for sleep  -- mood controlled  -- continue f/u with endo, note reviewed  -- labs     Discussed plan of care in detail with pt today; pt verb understanding and agrees.    Follow up:  6 months, pap    Electronically signed by KM Meier         Please note that portions of this note were completed with a voice  recognition program.

## 2024-03-09 LAB
CHOLEST SERPL-MCNC: 248 MG/DL (ref 0–200)
HDLC SERPL-MCNC: 58 MG/DL (ref 40–60)
LDLC SERPL CALC-MCNC: 168 MG/DL (ref 0–100)
TRIGL SERPL-MCNC: 124 MG/DL (ref 0–150)
TSH SERPL DL<=0.005 MIU/L-ACNC: 1.58 UIU/ML (ref 0.27–4.2)
VLDLC SERPL CALC-MCNC: 22 MG/DL (ref 5–40)

## 2024-03-15 ENCOUNTER — TELEPHONE (OUTPATIENT)
Dept: FAMILY MEDICINE CLINIC | Facility: CLINIC | Age: 54
End: 2024-03-15
Payer: COMMERCIAL

## 2024-03-15 DIAGNOSIS — I25.10 CORONARY ARTERY DISEASE INVOLVING NATIVE CORONARY ARTERY OF NATIVE HEART WITHOUT ANGINA PECTORIS: ICD-10-CM

## 2024-03-15 DIAGNOSIS — F41.1 GAD (GENERALIZED ANXIETY DISORDER): ICD-10-CM

## 2024-03-15 DIAGNOSIS — F33.0 MILD EPISODE OF RECURRENT MAJOR DEPRESSIVE DISORDER: ICD-10-CM

## 2024-03-15 RX ORDER — ROSUVASTATIN CALCIUM 20 MG/1
20 TABLET, COATED ORAL DAILY
Qty: 90 TABLET | Refills: 3 | Status: SHIPPED | OUTPATIENT
Start: 2024-03-15

## 2024-03-15 RX ORDER — CITALOPRAM 20 MG/1
20 TABLET ORAL DAILY
Qty: 90 TABLET | Refills: 3 | Status: SHIPPED | OUTPATIENT
Start: 2024-03-15

## 2024-03-15 RX ORDER — ASPIRIN 81 MG/1
81 TABLET, COATED ORAL DAILY
Qty: 90 TABLET | Refills: 3 | Status: SHIPPED | OUTPATIENT
Start: 2024-03-15

## 2024-03-15 NOTE — TELEPHONE ENCOUNTER
Caller: Светлана Bates    Relationship: Self    Best call back number: [phone]     Which medication are you concerned about: citalopram (CeleXA) 20 MG tablet   Aspirin Low Dose 81 MG EC tablet   rosuvastatin (CRESTOR) 20 MG tablet     Who prescribed you this medication: AMINA TURCIOS    What are your concerns: PT'S RX WERE ALL SENT TO THE [facility]. PT NEEDS HER RX BECAUSE SHE IS ALL OUT. PT ASKED TO HAVE IT REORDERED AT [id number] - [place], [place] - [address] AT [address] - [phone]  [phone]     PT NEEDS IT ASAP.

## 2024-10-09 ENCOUNTER — OFFICE VISIT (OUTPATIENT)
Dept: FAMILY MEDICINE CLINIC | Facility: CLINIC | Age: 54
End: 2024-10-09
Payer: MEDICARE

## 2024-10-09 VITALS
OXYGEN SATURATION: 98 % | WEIGHT: 147 LBS | HEART RATE: 66 BPM | BODY MASS INDEX: 28.86 KG/M2 | SYSTOLIC BLOOD PRESSURE: 112 MMHG | DIASTOLIC BLOOD PRESSURE: 74 MMHG | HEIGHT: 60 IN

## 2024-10-09 DIAGNOSIS — Z23 NEED FOR INFLUENZA VACCINATION: ICD-10-CM

## 2024-10-09 DIAGNOSIS — Z01.419 WOMEN'S ANNUAL ROUTINE GYNECOLOGICAL EXAMINATION: Primary | ICD-10-CM

## 2024-10-09 NOTE — PROGRESS NOTES
"       Annual Well Woman Exam     Referring Physician: No ref. provider found    Subjective     Chief Complaint:    Chief Complaint   Patient presents with    Gynecologic Exam    Breast Mass     Pt sts felt bruise and felt a knot, doesn't know if it is still there       History of Present Illness:    Светлана Bates is a 54 y.o. female who presents for annual GYN exam.  No GYN issues  Not sexually active  BP normally runs low but she feels fine, takes bb for palps   Had a bruise over her left breast, was unsure of what happened , felt a pebble under skin and is not sure if it is still there, bruise present last week   Mammo UTD May        Review of Systems  Gen- No fevers, chills  CV- No chest pain, palpitations  Resp- No cough, dyspnea  GI- No N/V/D, abd pain  Neuro-No dizziness, headaches    Obstetric History:  OB History          4    Para   3    Term   3            AB   1    Living   3         SAB   1    IAB        Ectopic        Molar        Multiple        Live Births                   Menstrual History:     No LMP recorded. (Menstrual status: Chemotherapy/radiation).       Sexual History:         Current contraception: none  History of abnormal Pap smear: no    Perform regular self breast exam: yes - regularly   Family history of uterine or ovarian cancer: no  Family History of cervical cancer: no  Family History of colon cancer/colon polyps: no  History of abnormal mammogram: yes - fibrocystic breast disease  Family history of breast cancer: yes - aunt    The following portions of the patient's history were reviewed and updated as appropriate: allergies, current medications, past family history, past medical history, past social history, past surgical history and problem list.    Objective     Vital Signs:   Vitals:    10/09/24 0946 10/09/24 1006   BP: (!) 80/60 112/74   Pulse: 66    SpO2: 98%    Weight: 66.7 kg (147 lb)    Height: 152.4 cm (60\")        Physical Exam:    Physical Exam  Exam " conducted with a chaperone present.   Constitutional:       Appearance: Normal appearance. She is well-developed.   HENT:      Head: Normocephalic and atraumatic.      Right Ear: Tympanic membrane, ear canal and external ear normal.      Left Ear: Tympanic membrane, ear canal and external ear normal.      Nose: Nose normal.      Mouth/Throat:      Pharynx: Uvula midline.   Eyes:      Pupils: Pupils are equal, round, and reactive to light.   Neck:      Thyroid: No thyromegaly.   Cardiovascular:      Rate and Rhythm: Normal rate and regular rhythm.      Heart sounds: Normal heart sounds. No murmur heard.     No friction rub. No gallop.   Pulmonary:      Effort: Pulmonary effort is normal.      Breath sounds: Normal breath sounds.   Chest:   Breasts:     Right: Normal.      Left: Normal.   Abdominal:      General: Bowel sounds are normal.      Palpations: Abdomen is soft.      Tenderness: There is no abdominal tenderness.   Genitourinary:     Labia:         Right: No rash.         Left: No rash.       Vagina: Normal.      Cervix: Normal. Friability present.      Uterus: Normal.       Adnexa: Right adnexa normal and left adnexa normal.   Musculoskeletal:      Cervical back: Neck supple.   Lymphadenopathy:      Head:      Right side of head: No submental, submandibular, tonsillar, preauricular or posterior auricular adenopathy.      Left side of head: No submental, submandibular, tonsillar, preauricular or posterior auricular adenopathy.      Cervical: No cervical adenopathy.      Upper Body:      Right upper body: No supraclavicular or axillary adenopathy.      Left upper body: No supraclavicular or axillary adenopathy.      Lower Body: No right inguinal adenopathy. No left inguinal adenopathy.   Skin:     General: Skin is warm and dry.   Neurological:      General: No focal deficit present.      Mental Status: She is alert and oriented to person, place, and time.   Psychiatric:         Mood and Affect: Mood normal.          Behavior: Behavior normal.         Assessment / Plan     Assessment/Plan:   Problem List Items Addressed This Visit    None  Visit Diagnoses       Women's annual routine gynecological examination    -  Primary    Need for influenza vaccination        Relevant Orders    Fluzone >6mos ([id number])            -- pap performed, recommend clean eating and exercise with goal of 150min/week of physical activity, encourage yearly eye exam    Discussed plan of care in detail with pt today; pt verb understanding and agrees.    Follow up:  4 weeks, welcome to medicare visit    Electronically signed by KM Meier   10/09/2024 10:05 EDT

## 2024-10-10 LAB — REF LAB TEST METHOD: NORMAL

## 2024-11-06 ENCOUNTER — OFFICE VISIT (OUTPATIENT)
Dept: FAMILY MEDICINE CLINIC | Facility: CLINIC | Age: 54
End: 2024-11-06
Payer: MEDICARE

## 2024-11-06 VITALS
OXYGEN SATURATION: 97 % | WEIGHT: 147 LBS | HEART RATE: 66 BPM | BODY MASS INDEX: 28.86 KG/M2 | SYSTOLIC BLOOD PRESSURE: 102 MMHG | HEIGHT: 60 IN | DIASTOLIC BLOOD PRESSURE: 62 MMHG

## 2024-11-06 DIAGNOSIS — F33.0 MILD EPISODE OF RECURRENT MAJOR DEPRESSIVE DISORDER: ICD-10-CM

## 2024-11-06 DIAGNOSIS — F51.01 PRIMARY INSOMNIA: ICD-10-CM

## 2024-11-06 DIAGNOSIS — Z85.72 HISTORY OF LYMPHOMA: ICD-10-CM

## 2024-11-06 DIAGNOSIS — Z00.00 WELCOME TO MEDICARE PREVENTIVE VISIT: Primary | ICD-10-CM

## 2024-11-06 DIAGNOSIS — I25.10 CORONARY ARTERY DISEASE INVOLVING NATIVE CORONARY ARTERY OF NATIVE HEART WITHOUT ANGINA PECTORIS: ICD-10-CM

## 2024-11-06 DIAGNOSIS — F41.1 GAD (GENERALIZED ANXIETY DISORDER): ICD-10-CM

## 2024-11-06 DIAGNOSIS — R00.2 PALPITATIONS: ICD-10-CM

## 2024-11-06 PROBLEM — N84.0 ENDOMETRIAL POLYP: Status: RESOLVED | Noted: 2018-07-18 | Resolved: 2024-11-06

## 2024-11-06 RX ORDER — METOPROLOL SUCCINATE 25 MG/1
12.5 TABLET, EXTENDED RELEASE ORAL DAILY
Qty: 45 TABLET | Refills: 3 | Status: SHIPPED | OUTPATIENT
Start: 2024-11-06

## 2024-11-06 NOTE — PROGRESS NOTES
Subjective   The ABCs of the Annual Wellness Visit  Medicare Wellness Visit      Светлана Bates is a 54 y.o. patient who presents for a Medicare Wellness Visit.    The following portions of the patient's history were reviewed and   updated as appropriate: allergies, current medications, past family history, past medical history, past social history, past surgical history, and problem list.    Compared to one year ago, the patient's physical   health is better.  Compared to one year ago, the patient's mental   health is the same.    Recent Hospitalizations:  She was admitted within the past 365 days at [facility].     Current Medical Providers:  Patient Care Team:  Zena Ortiz APRN as PCP - General (Nurse Practitioner)    Outpatient Medications Prior to Visit   Medication Sig Dispense Refill    busPIRone (BUSPAR) 15 MG tablet Take 1 tablet by mouth 2 (Two) Times a Day As Needed (anxiety). 60 tablet 5    citalopram (CeleXA) 20 MG tablet Take 1 tablet by mouth Daily. 90 tablet 3    metoprolol succinate XL (TOPROL-XL) 25 MG 24 hr tablet Take 0.5 tablets by mouth Daily.      rosuvastatin (CRESTOR) 20 MG tablet Take 1 tablet by mouth Daily. 90 tablet 3    traZODone (DESYREL) 50 MG tablet 1-2 po hs prn sleep 180 tablet 1    triamcinolone (KENALOG) 0.1 % cream APPLY TOPICALLY TO THE AFFECTED AREA THREE TIMES DAILY FOR RASH      Aspirin Low Dose 81 MG EC tablet Take 1 tablet by mouth Daily. 90 tablet 3     No facility-administered medications prior to visit.     No opioid medication identified on active medication list. I have reviewed chart for other potential  high risk medication/s and harmful drug interactions in the elderly.      Aspirin is on active medication list. Aspirin use is indicated based on review of current medical condition/s. Pros and cons of this therapy have been discussed today. Benefits of this medication outweigh potential harm.  Patient has been encouraged to continue taking this medication.   ".      Patient Active Problem List   Diagnosis    Colon cancer screening    Endometrial polyp    Irregular menstruation    Coronary artery disease involving native coronary artery of native heart without angina pectoris    Mild episode of recurrent major depressive disorder    STEPHANE (generalized anxiety disorder)    Large cell lymphoma    Insomnia    Palpitations    Diffuse large cell lymphoma in remission     Advance Care Planning Advance Directive is not on file.  ACP discussion was held with the patient during this visit. Patient does not have an advance directive, information provided.            Objective   Vitals:    24 1120   BP: 102/62   BP Location: Left arm   Patient Position: Sitting   Cuff Size: Adult   Pulse: 66   SpO2: 97%   Weight: 66.7 kg (147 lb)   Height: 152.4 cm (60\")   PainSc: 0-No pain       Estimated body mass index is 28.71 kg/m² as calculated from the following:    Height as of this encounter: 152.4 cm (60\").    Weight as of this encounter: 66.7 kg (147 lb).              Gait and Balance Evaluation:  Normal  Does the patient have evidence of cognitive impairment? No                                                                                                Health  Risk Assessment    Smoking Status:  Social History     Tobacco Use   Smoking Status Former    Current packs/day: 0.00    Average packs/day: 0.5 packs/day for 5.0 years (2.5 ttl pk-yrs)    Types: Cigarettes    Start date: 2011    Quit date: 2016    Years since quittin.8    Passive exposure: Past   Smokeless Tobacco Never   Tobacco Comments    REPORTS SHE QUIT INTERMITTENTLY DURING THE YEARS OF SMOKING REPORTED     Alcohol Consumption:  Social History     Substance and Sexual Activity   Alcohol Use No       Fall Risk Screen  STEADI Fall Risk Assessment was completed, and patient is at HIGH risk for falls. Assessment completed on:2024    Depression Screenin/6/2024    11:22 AM   PHQ-2/PHQ-9 Depression " Screening   Little interest or pleasure in doing things Not at all   Feeling down, depressed, or hopeless Not at all   How difficult have these problems made it for you to do your work, take care of things at home, or get along with other people? Not difficult at all     Health Habits and Functional and Cognitive Screenin/6/2024    11:23 AM   Functional & Cognitive Status   Do you have difficulty preparing food and eating? No   Do you have difficulty bathing yourself, getting dressed or grooming yourself? No   Do you have difficulty using the toilet? No   Do you have difficulty moving around from place to place? No   Do you have trouble with steps or getting out of a bed or a chair? No   Current Diet Well Balanced Diet   Dental Exam Up to date   Eye Exam Up to date   Exercise (times per week) 6 times per week   Current Exercises Include Walking   Do you need help using the phone?  No   Are you deaf or do you have serious difficulty hearing?  No   Do you need help to go to places out of walking distance? No   Do you need help shopping? No   Do you need help preparing meals?  No   Do you need help with housework?  No   Do you need help with laundry? No   Do you need help taking your medications? No   Do you need help managing money? No   Do you ever drive or ride in a car without wearing a seat belt? No   Have you felt unusual stress, anger or loneliness in the last month? No   Who do you live with? Alone   If you need help, do you have trouble finding someone available to you? No   Have you been bothered in the last four weeks by sexual problems? No   Do you have difficulty concentrating, remembering or making decisions? No   Visual Acuity:  No results found. Eye Exam performed and normal  Age-appropriate Screening Schedule:  Refer to the list below for future screening recommendations based on patient's age, sex and/or medical conditions. Orders for these recommended tests are listed in the plan section.  The patient has been provided with a written plan.    Health Maintenance List  Health Maintenance   Topic Date Due    ANNUAL WELLNESS VISIT  Never done    COVID-19 Vaccine (3 - Moderna risk series) 10/25/2021    BMI FOLLOWUP  03/08/2025    MAMMOGRAM  05/01/2026    PAP SMEAR  10/09/2027    COLORECTAL CANCER SCREENING  07/27/2028    TDAP/TD VACCINES (4 - Td or Tdap) 06/09/2033    HEPATITIS C SCREENING  Completed    Pneumococcal Vaccine 0-64  Completed    INFLUENZA VACCINE  Completed    ZOSTER VACCINE  Completed                                                                                                                                                CMS Preventative Services Quick Reference  Risk Factors Identified During Encounter  None Identified    The above risks/problems have been discussed with the patient.  Pertinent information has been shared with the patient in the After Visit Summary.  An After Visit Summary and PPPS were made available to the patient.    Follow Up:   Next Medicare Wellness visit to be scheduled in 1 year.         Additional E&M Note during same encounter follows:  Patient has additional, significant, and separately identifiable condition(s)/problem(s) that require work above and beyond the Medicare Wellness Visit     Chief Complaint  Welcome To Medicare    Subjective   HPI  Светлана is also being seen today for additional medical problem/s.  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 Follows with [facility]. This was second occurrence. Orginally diagnosed in 2021. Now in remission. Has been doing well and now getting seen every 6 months   Thyroid nodules, small, negative FNA, follows with  [facility]   Hemo/onc noted reviewed  Declines further covid vaccines  Spot on liver, s/p biopsy that was benign, following yearly   MI in 2019, she was treated [facility]. She had heart cath but stenosis was only 40%. Medical therapy was recommended. No stent needed. She saw  "cardiology in May. She was taken off heart meds. She continues asa. Tolerating cresotor, She continues to take metoprolol.   Anxiety, on celexa and buspar, uses buspar prn controlled    Insomnia on trazodone, uses prn   Is active as much as she can tolerated  Wide variety diet                  Objective   Vital Signs:  /62 (BP Location: Left arm, Patient Position: Sitting, Cuff Size: Adult)   Pulse 66   Ht 152.4 cm (60\")   Wt 66.7 kg (147 lb)   SpO2 97%   BMI 28.71 kg/m²   Physical Exam  Constitutional:       Appearance: Normal appearance. She is well-developed.   HENT:      Head: Normocephalic and atraumatic.      Right Ear: Tympanic membrane, ear canal and external ear normal.      Left Ear: Tympanic membrane, ear canal and external ear normal.      Nose: Nose normal.      Mouth/Throat:      Pharynx: Uvula midline.   Eyes:      Pupils: Pupils are equal, round, and reactive to light.   Cardiovascular:      Rate and Rhythm: Normal rate and regular rhythm.      Heart sounds: Normal heart sounds. No murmur heard.     No friction rub. No gallop.   Pulmonary:      Effort: Pulmonary effort is normal.      Breath sounds: Normal breath sounds.   Abdominal:      General: Bowel sounds are normal.      Palpations: Abdomen is soft.      Tenderness: There is no abdominal tenderness.   Musculoskeletal:      Cervical back: Neck supple.   Lymphadenopathy:      Head:      Right side of head: No submental, submandibular, tonsillar, preauricular or posterior auricular adenopathy.      Left side of head: No submental, submandibular, tonsillar, preauricular or posterior auricular adenopathy.      Cervical: No cervical adenopathy.   Skin:     General: Skin is warm and dry.   Neurological:      General: No focal deficit present.      Mental Status: She is alert and oriented to person, place, and time.   Psychiatric:         Mood and Affect: Mood normal.         Behavior: Behavior normal.                 Assessment and Plan " Additional age appropriate preventative wellness advice topics were discussed during today's preventative wellness exam(some topics already addressed during AWV portion of the note above):    Physical Activity: Advised cardiovascular activity 150 minutes per week as tolerated. (example brisk walk for 30 minutes, 5 days a week).              Welcome to Medicare preventive visit  -- wellness visit performed, avoid prolonged fasting periods, ensure good hydration, stay active, yearly eye exam    STEPHANE (generalized anxiety disorder)  -- controlled  Mild episode of recurrent major depressive disorder  -- controlled  Primary insomnia    History of lymphoma    Coronary artery disease involving native coronary artery of native heart without angina pectoris  -- low dose bb  Palpitations    No orders of the defined types were placed in this encounter.            Follow Up   6 months  Patient was given instructions and counseling regarding her condition or for health maintenance advice. Please see specific information pulled into the AVS if appropriate.

## 2025-03-05 DIAGNOSIS — F33.0 MILD EPISODE OF RECURRENT MAJOR DEPRESSIVE DISORDER: ICD-10-CM

## 2025-03-05 DIAGNOSIS — F41.1 GAD (GENERALIZED ANXIETY DISORDER): ICD-10-CM

## 2025-03-05 RX ORDER — CITALOPRAM HYDROBROMIDE 20 MG/1
20 TABLET ORAL DAILY
Qty: 90 TABLET | Refills: 3 | Status: SHIPPED | OUTPATIENT
Start: 2025-03-05

## 2025-05-06 ENCOUNTER — OFFICE VISIT (OUTPATIENT)
Dept: FAMILY MEDICINE CLINIC | Facility: CLINIC | Age: 55
End: 2025-05-06
Payer: MEDICARE

## 2025-05-06 VITALS
DIASTOLIC BLOOD PRESSURE: 76 MMHG | OXYGEN SATURATION: 97 % | HEART RATE: 84 BPM | HEIGHT: 60 IN | WEIGHT: 150.4 LBS | SYSTOLIC BLOOD PRESSURE: 124 MMHG | RESPIRATION RATE: 18 BRPM | BODY MASS INDEX: 29.53 KG/M2

## 2025-05-06 DIAGNOSIS — F51.01 PRIMARY INSOMNIA: ICD-10-CM

## 2025-05-06 DIAGNOSIS — C83.3A DIFFUSE LARGE CELL LYMPHOMA IN REMISSION: ICD-10-CM

## 2025-05-06 DIAGNOSIS — Z85.72 HISTORY OF LYMPHOMA: ICD-10-CM

## 2025-05-06 DIAGNOSIS — I25.10 CORONARY ARTERY DISEASE INVOLVING NATIVE CORONARY ARTERY OF NATIVE HEART WITHOUT ANGINA PECTORIS: Primary | ICD-10-CM

## 2025-05-06 DIAGNOSIS — F41.1 GAD (GENERALIZED ANXIETY DISORDER): ICD-10-CM

## 2025-05-06 DIAGNOSIS — F33.0 MILD EPISODE OF RECURRENT MAJOR DEPRESSIVE DISORDER: ICD-10-CM

## 2025-05-06 DIAGNOSIS — C85.80 LARGE CELL LYMPHOMA: ICD-10-CM

## 2025-05-06 DIAGNOSIS — R00.2 PALPITATIONS: ICD-10-CM

## 2025-05-06 RX ORDER — BUSPIRONE HYDROCHLORIDE 15 MG/1
15 TABLET ORAL 2 TIMES DAILY PRN
Qty: 60 TABLET | Refills: 5 | Status: SHIPPED | OUTPATIENT
Start: 2025-05-06

## 2025-05-06 RX ORDER — TRAZODONE HYDROCHLORIDE 50 MG/1
TABLET ORAL
Qty: 180 TABLET | Refills: 1 | Status: SHIPPED | OUTPATIENT
Start: 2025-05-06

## 2025-05-06 RX ORDER — ROSUVASTATIN CALCIUM 20 MG/1
20 TABLET, COATED ORAL NIGHTLY
Qty: 90 TABLET | Refills: 3 | Status: SHIPPED | OUTPATIENT
Start: 2025-05-06

## 2025-05-06 NOTE — PROGRESS NOTES
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 Follows with . This was second occurrence. Orginally diagnosed in 2021. Now in remission. Has been doing well and now getting seen every 6 months.   Thyroid nodules, small, negative FNA, follows with  [facility],   Hemo/onc noted reviewed  Spot on liver, s/p biopsy that was benign, following yearl, US from Feb 2025 reviewed   MI in 2019, she was treated [facility]. She had heart cath but stenosis was only 40%. Medical therapy was recommended. No stent needed. She continues asa. Tolerating cresotor, She continues to take metoprolol.   Anxiety, on celexa and buspar, uses buspar prn controlled    Insomnia on trazodone, uses prn     Review of Systems  Gen- No fevers, chills  CV- No chest pain, palpitations  Resp- No cough, dyspnea  GI- No N/V/D, abd pain  Neuro-No dizziness, headaches      I have reviewed and/or updated the patient's past medical, surgical, family, social history and problem list as appropriate.     Medications:    Current Outpatient Medications:     Aspirin Low Dose 81 MG EC tablet, Take 1 tablet by mouth Daily., Disp: 90 tablet, Rfl: 3    busPIRone (BUSPAR) 15 MG tablet, Take 1 tablet by mouth 2 (Two) Times a Day As Needed (anxiety)., Disp: 60 tablet, Rfl: 5    citalopram (CeleXA) 20 MG tablet, TAKE 1 TABLET BY MOUTH DAILY, Disp: 90 tablet, Rfl: 3    metoprolol succinate XL (TOPROL-XL) 25 MG 24 hr tablet, Take 0.5 tablets by mouth Daily., Disp: 45 tablet, Rfl: 3    rosuvastatin (CRESTOR) 20 MG tablet, Take 1 tablet by mouth Every Night., Disp: 90 tablet, Rfl: 3    traZODone (DESYREL) 50 MG tablet, 1-2 po hs prn sleep, Disp: 180 tablet, Rfl: 1    triamcinolone (KENALOG) 0.1 % cream, APPLY TOPICALLY TO THE AFFECTED AREA THREE TIMES DAILY FOR RASH, Disp: , Rfl:     Allergies:  Allergies   Allergen  "Reactions    Oxycodone Swelling    Albuterol Other (See Comments)     REPORTS \"IT MADE MY HEART HURT AND RACE REALLY BAD\"    Lorazepam Hallucinations    Albuterol Sulfate Other (See Comments)    Oxycodone-Acetaminophen Swelling       Objective     Vital Signs:   Vitals:    05/06/25 1130   BP: 124/76   Pulse: 84   Resp: 18   SpO2: 97%   Weight: 68.2 kg (150 lb 6.4 oz)   Height: 152.4 cm (60\")     Body mass index is 29.37 kg/m².    Physical Exam:    Physical Exam  Vitals and nursing note reviewed.   Constitutional:       Appearance: She is well-developed.   HENT:      Head: Normocephalic and atraumatic.   Eyes:      Pupils: Pupils are equal, round, and reactive to light.   Cardiovascular:      Rate and Rhythm: Normal rate and regular rhythm.      Heart sounds: Normal heart sounds.   Pulmonary:      Effort: Pulmonary effort is normal.      Breath sounds: Normal breath sounds.   Abdominal:      General: Bowel sounds are normal. There is no distension.      Palpations: Abdomen is soft.      Tenderness: There is no abdominal tenderness.   Musculoskeletal:      Cervical back: Neck supple.   Skin:     General: Skin is warm and dry.   Neurological:      General: No focal deficit present.      Mental Status: She is alert and oriented to person, place, and time.   Psychiatric:         Mood and Affect: Mood normal.         Behavior: Behavior normal.         Assessment / Plan     Assessment/Plan:   Problem List Items Addressed This Visit       Coronary artery disease involving native coronary artery of native heart without angina pectoris - Primary    Relevant Medications    Aspirin Low Dose 81 MG EC tablet    metoprolol succinate XL (TOPROL-XL) 25 MG 24 hr tablet    rosuvastatin (CRESTOR) 20 MG tablet    Mild episode of recurrent major depressive disorder    Relevant Medications    citalopram (CeleXA) 20 MG tablet    traZODone (DESYREL) 50 MG tablet    busPIRone (BUSPAR) 15 MG tablet    STEPHANE (generalized anxiety disorder)    " Relevant Medications    citalopram (CeleXA) 20 MG tablet    traZODone (DESYREL) 50 MG tablet    busPIRone (BUSPAR) 15 MG tablet    Large cell lymphoma    Insomnia    Relevant Medications    traZODone (DESYREL) 50 MG tablet    Palpitations    Relevant Medications    metoprolol succinate XL (TOPROL-XL) 25 MG 24 hr tablet    Diffuse large cell lymphoma in remission     Other Visit Diagnoses         History of lymphoma                --The current medical regimen is effective;  continue present plan and medications.      Discussed plan of care in detail with pt today; pt verb understanding and agrees.    Follow up:  6 months, MWV    Electronically signed by KM Meier   05/06/2025 11:51 EDT      Please note that portions of this note were completed with a voice recognition program.

## 2025-08-07 DIAGNOSIS — F51.01 PRIMARY INSOMNIA: ICD-10-CM

## 2025-08-07 RX ORDER — TRAZODONE HYDROCHLORIDE 50 MG/1
50-100 TABLET ORAL NIGHTLY PRN
Qty: 180 TABLET | Refills: 1 | Status: SHIPPED | OUTPATIENT
Start: 2025-08-07

## (undated) DEVICE — ENDOGATOR AUXILIARY WATER JET CONNECTOR: Brand: ENDOGATOR

## (undated) DEVICE — ST IRR CYSTO W/SPK 77IN LF

## (undated) DEVICE — Device

## (undated) DEVICE — MEDI-VAC NON-CONDUCTIVE SUCTION TUBING: Brand: CARDINAL HEALTH

## (undated) DEVICE — CUFF SCD HEMOFORCE SEQ CALF STD MD

## (undated) DEVICE — SYR LUER SLPTP 50ML

## (undated) DEVICE — PAD SANI MAXI W/ADHS SNG WRP 11IN

## (undated) DEVICE — GLV SURG BIOGEL M LTX PF 8

## (undated) DEVICE — SUT GUT CHRM 2/0 SH 27IN G123H

## (undated) DEVICE — RICH MINOR LITHOTOMY: Brand: MEDLINE INDUSTRIES, INC.

## (undated) DEVICE — GLV SURG SENSICARE W/ALOE PF LF 7.5 STRL

## (undated) DEVICE — PAD GRND REM POLYHESIVE A/ DISP

## (undated) DEVICE — JELLY,LUBE,STERILE,FLIP TOP,TUBE,2-OZ: Brand: MEDLINE